# Patient Record
Sex: FEMALE | Race: BLACK OR AFRICAN AMERICAN | Employment: FULL TIME | URBAN - METROPOLITAN AREA
[De-identification: names, ages, dates, MRNs, and addresses within clinical notes are randomized per-mention and may not be internally consistent; named-entity substitution may affect disease eponyms.]

---

## 2017-03-04 ENCOUNTER — HOSPITAL ENCOUNTER (EMERGENCY)
Age: 27
Discharge: HOME | End: 2017-03-04
Attending: EMERGENCY MEDICINE
Payer: COMMERCIAL

## 2017-03-04 VITALS
RESPIRATION RATE: 16 BRPM | OXYGEN SATURATION: 97 % | TEMPERATURE: 98.3 F | SYSTOLIC BLOOD PRESSURE: 126 MMHG | HEART RATE: 90 BPM | DIASTOLIC BLOOD PRESSURE: 71 MMHG | BODY MASS INDEX: 46.65 KG/M2 | HEIGHT: 65 IN | WEIGHT: 280 LBS

## 2017-03-04 DIAGNOSIS — J06.9 URI WITH COUGH AND CONGESTION: Primary | ICD-10-CM

## 2017-03-04 PROCEDURE — 99282 EMERGENCY DEPT VISIT SF MDM: CPT

## 2017-03-04 RX ORDER — CODEINE PHOSPHATE AND GUAIFENESIN 10; 100 MG/5ML; MG/5ML
5 SOLUTION ORAL
Qty: 120 ML | Refills: 0 | Status: SHIPPED | OUTPATIENT
Start: 2017-03-04 | End: 2017-04-20

## 2017-03-04 RX ORDER — AZITHROMYCIN 250 MG/1
TABLET, FILM COATED ORAL
Qty: 6 TAB | Refills: 0 | Status: SHIPPED | OUTPATIENT
Start: 2017-03-04 | End: 2017-03-09

## 2017-03-04 NOTE — LETTER
[facility] EMERGENCY DEPT 
[address] [facility] [address] [id number] [phone] Work/School Note Date: 3/4/2017 To Whom It May concern: 
 
Tico Davis was seen and treated today in the emergency room by the following provider(s): 
Attending Provider: Figueroa Ramachandran MD 
Physician Assistant: Damon Galicia. Rick Alford. Tico Davis may return to work on 3/7/17. Sincerely, 
 
 
 
 
Damon Galicia.  Rick Alford

## 2017-03-04 NOTE — DISCHARGE INSTRUCTIONS

## 2017-03-04 NOTE — ED PROVIDER NOTES
Patient is a 32 y.o. female presenting with cough. The history is provided by the patient. Cough   This is a new problem. The current episode started more than 1 week ago (Pt has had scantiliy productive cough and URI SXs x 1wwek numerous sick contacts and had flu vac 2 mo ago. Had similar SXs a year ago started to recover then relasped to pneumonia). Associated symptoms include rhinorrhea. Pertinent negatives include no sore throat, no shortness of breath and no wheezing. Past Medical History:   Diagnosis Date    Bronchitis        Past Surgical History:   Procedure Laterality Date    HX GYN      c section x2         No family history on file. Social History     Social History    Marital status: SINGLE     Spouse name: N/A    Number of children: N/A    Years of education: N/A     Occupational History    Not on file. Social History Main Topics    Smoking status: Never Smoker    Smokeless tobacco: Not on file    Alcohol use Yes      Comment: monthly    Drug use: No    Sexual activity: Yes     Partners: Male     Birth control/ protection: None     Other Topics Concern    Not on file     Social History Narrative         ALLERGIES: Review of patient's allergies indicates no known allergies. Review of Systems   Constitutional: Negative. HENT: Positive for congestion, rhinorrhea and sinus pressure. Negative for sore throat. Eyes: Negative. Respiratory: Positive for cough. Negative for apnea, choking, chest tightness, shortness of breath, wheezing and stridor. Cardiovascular: Negative. Gastrointestinal: Negative. Neurological: Negative. Vitals:    03/04/17 1317   BP: 126/71   Pulse: 90   Resp: 16   Temp: 98.3 °F (36.8 °C)   SpO2: 97%   Weight: 127 kg (280 lb)   Height: 5' 5\" (1.651 m)            Physical Exam   Constitutional: She is oriented to person, place, and time. She appears well-developed and well-nourished. HENT:   Head: Normocephalic and atraumatic.    Right Ear: External ear normal.   Left Ear: External ear normal.   Nose: Nose normal.   Mouth/Throat: Oropharynx is clear and moist.   Eyes: Pupils are equal, round, and reactive to light. Neck: Normal range of motion. Cardiovascular: Normal rate, regular rhythm and normal heart sounds. Pulmonary/Chest: Effort normal.   BILATERAL RHONCHI THAT WAS CLEARED COMPLETELY BY COUGHING   Abdominal: Soft. Bowel sounds are normal.   Neurological: She is alert and oriented to person, place, and time. She has normal reflexes. Skin: Skin is warm and dry. Psychiatric: She has a normal mood and affect.         MDM  Number of Diagnoses or Management Options    ED Course       Procedures

## 2017-03-04 NOTE — ED NOTES
Emergency Department Nursing Plan of Care       The Nursing Plan of Care is developed from the Nursing assessment and Emergency Department Attending provider initial evaluation. The plan of care may be reviewed in the ED Provider note.     The Plan of Care was developed with the following considerations:   Patient / Family readiness to learn indicated by:verbalized understanding  Persons(s) to be included in education: patient  Barriers to Learning/Limitations:No    Signed     Dora Benz RN    3/4/2017   2:00 PM

## 2017-03-16 ENCOUNTER — HOSPITAL ENCOUNTER (EMERGENCY)
Age: 27
Discharge: HOME | End: 2017-03-16
Attending: EMERGENCY MEDICINE
Payer: COMMERCIAL

## 2017-03-16 VITALS
DIASTOLIC BLOOD PRESSURE: 81 MMHG | RESPIRATION RATE: 18 BRPM | SYSTOLIC BLOOD PRESSURE: 138 MMHG | HEART RATE: 88 BPM | TEMPERATURE: 98.5 F | WEIGHT: 285 LBS | HEIGHT: 65 IN | BODY MASS INDEX: 47.48 KG/M2 | OXYGEN SATURATION: 100 %

## 2017-03-16 DIAGNOSIS — M77.8 LEFT WRIST TENDONITIS: Primary | ICD-10-CM

## 2017-03-16 DIAGNOSIS — M79.605 LEFT LEG PAIN: ICD-10-CM

## 2017-03-16 PROCEDURE — 99283 EMERGENCY DEPT VISIT LOW MDM: CPT

## 2017-03-16 PROCEDURE — L3908 WHO COCK-UP NONMOLDE PRE OTS: HCPCS

## 2017-03-16 RX ORDER — HYDROCODONE BITARTRATE AND ACETAMINOPHEN 5; 325 MG/1; MG/1
1 TABLET ORAL
Qty: 6 TAB | Refills: 0 | Status: SHIPPED | OUTPATIENT
Start: 2017-03-16 | End: 2017-04-20

## 2017-03-16 RX ORDER — PREDNISONE 10 MG/1
TABLET ORAL
Qty: 48 TAB | Refills: 0 | Status: SHIPPED | OUTPATIENT
Start: 2017-03-16 | End: 2017-06-28

## 2017-03-16 NOTE — ED PROVIDER NOTES
HPI Comments: Pt presents with no sign PMHx complaining of recurrent left wrist/arm pain that extends from her neck at times. Pt also complains of intermittent left upper leg cramping worsening over the past 3 weeks. Pt was previously seen in ED for this complaint on 8/2016. Negative cervical xray. Pt works for [employer] and states she performs repetitive movements daily at work including lifting packages, typing, and standing on a hard floor. Pt reports tingling in her left arm. Denies trauma/injury. Pt also reports cramping in her left upper leg x 3 weeks. Denies trauma/injury, leg swelling/warmth, hx of DVT, estrogen use, recent long trip and surgery. Pt also denies hip and back pain, saddle anesthesia, bowel/bladder incontinence. Patient is a 32 y.o. female presenting with arm pain and leg pain. The history is provided by the patient. Arm Pain    This is a recurrent problem. The pain is present in the left wrist, left arm and left elbow. The pain is at a severity of 5/10. The pain is moderate. Associated symptoms include tingling and neck pain. Pertinent negatives include no numbness, full range of motion, no stiffness and no back pain. There has been no history of extremity trauma. Leg Pain    This is a new problem. Episode onset: x 3 weeks. The pain is present in the left upper leg. The quality of the pain is described as aching. The pain is at a severity of 5/10. The pain is moderate. Associated symptoms include tingling and neck pain. Pertinent negatives include no numbness, full range of motion, no stiffness and no back pain. She has tried nothing for the symptoms. There has been no history of extremity trauma. Past Medical History:   Diagnosis Date    Bronchitis        Past Surgical History:   Procedure Laterality Date    HX GYN      c section x2         History reviewed. No pertinent family history.     Social History     Social History    Marital status: SINGLE     Spouse name: N/A    Number of children: N/A    Years of education: N/A     Occupational History    Not on file. Social History Main Topics    Smoking status: Never Smoker    Smokeless tobacco: Not on file    Alcohol use Yes      Comment: monthly    Drug use: No    Sexual activity: Yes     Partners: Male     Birth control/ protection: None     Other Topics Concern    Not on file     Social History Narrative         ALLERGIES: Review of patient's allergies indicates no known allergies. Review of Systems   Constitutional: Negative for chills and fever. Eyes: Negative for photophobia and visual disturbance. Respiratory: Negative for chest tightness and shortness of breath. Cardiovascular: Negative for chest pain. Gastrointestinal: Negative for abdominal pain, nausea and vomiting. Genitourinary: Negative for enuresis and flank pain. Musculoskeletal: Positive for myalgias and neck pain. Negative for arthralgias, back pain, gait problem, joint swelling, neck stiffness and stiffness. Skin: Negative for color change, pallor, rash and wound. Neurological: Positive for tingling. Negative for dizziness, weakness, light-headedness, numbness and headaches. All other systems reviewed and are negative. Vitals:    03/16/17 1002   BP: 138/81   Pulse: 88   Resp: 18   Temp: 98.5 °F (36.9 °C)   SpO2: 100%   Weight: 129.3 kg (285 lb)   Height: 5' 5\" (1.651 m)            Physical Exam   Constitutional: She is oriented to person, place, and time. She appears well-developed and well-nourished. No distress. HENT:   Head: Normocephalic and atraumatic. Eyes: Conjunctivae are normal.   Cardiovascular: Normal rate, regular rhythm and normal heart sounds. Pulmonary/Chest: Effort normal and breath sounds normal. No respiratory distress. Abdominal: Soft. Bowel sounds are normal. There is no tenderness.    Musculoskeletal:        Left elbow: Normal.        Left wrist: She exhibits normal range of motion ( NVI), no tenderness, no bony tenderness, no swelling, no crepitus and no deformity. Left hip: Normal.        Left knee: Normal.        Lumbar back: Normal.        Left forearm: Normal.        Left hand: Normal.        Left upper leg: Normal.        Left lower leg: Normal.   (+) Phalen Test   Neurological: She is alert and oriented to person, place, and time. Skin: Skin is warm. No rash noted. Psychiatric: She has a normal mood and affect. Her behavior is normal.   Nursing note and vitals reviewed. MDM  Number of Diagnoses or Management Options  Diagnosis management comments: DDx: Carpal Tunnel, Wrist Tendonitis, Leg Cramping, Cellulitis     ED Course       Procedures      LABORATORY TESTS:  No results found for this or any previous visit (from the past 12 hour(s)). IMAGING RESULTS:  No orders to display       MEDICATIONS GIVEN:  Medications - No data to display    IMPRESSION:  1. Left wrist tendonitis    2. Left leg pain        PLAN:  1. Discharge Medication List as of 3/16/2017 11:12 AM      START taking these medications    Details   predniSONE (STERAPRED DS) 10 mg dose pack Take as directed, Print, Disp-48 Tab, R-0      HYDROcodone-acetaminophen (NORCO) 5-325 mg per tablet Take 1 Tab by mouth every six (6) hours as needed for Pain. Max Daily Amount: 4 Tabs., Print, Disp-6 Tab, R-0         CONTINUE these medications which have NOT CHANGED    Details   levonorgestrel (MIRENA) 20 mcg/24 hr (5 years) IUD 1 Each by IntraUTERine route once. Indications: PREGNANCY CONTRACEPTION, Historical Med      guaiFENesin-codeine (ROBITUSSIN AC) 100-10 mg/5 mL solution Take 5 mL by mouth three (3) times daily as needed for Cough. Max Daily Amount: 15 mL. , Print, Disp-120 mL, R-0      albuterol (PROVENTIL HFA, VENTOLIN HFA, PROAIR HFA) 90 mcg/actuation inhaler Take 2 Puffs by inhalation every four (4) hours as needed for Wheezing., Print, Disp-1 Inhaler, R-1           2.    Follow-up Information     Follow up With Details Comments Contact Info    Elizabeth Perez MD Schedule an appointment as soon as possible for a visit in 2 days  [address]  [facility] [address] [address] [address] Schedule an appointment as soon as possible for a visit As needed [address] [phone] [address] [facility] [address] [facility] Schedule an appointment as soon as possible for a visit As needed [address] [place] [address]  [address] [facility] [place]  [phone]        Return to ED if worse

## 2017-03-16 NOTE — DISCHARGE INSTRUCTIONS
Wrist Tendinitis: Exercises  Your Care Instructions  Here are some examples of typical rehabilitation exercises for your condition. Start each exercise slowly. Ease off the exercise if you start to have pain. Your doctor or your physical or occupational therapist will tell you when you can start these exercises and which ones will work best for you. How to do the exercises  Wrist flexion and extension    1. Place your forearm on a table, with your hand and affected wrist extended beyond the table, palm down. 2. Bend your wrist to move your hand upward and allow your hand to close into a fist, then lower your hand and allow your fingers to relax. Hold each position for about 6 seconds. 3. Repeat 8 to 12 times. Hand flips    1. While seated, place your forearm and affected wrist on your thigh, palm down. 2. Flip your hand over so the back of your hand rests on your thigh and your palm is up. Alternate between palm up and palm down while keeping your forearm on your thigh. 3. Repeat 8 to 12 times. Wrist radial and ulnar deviation    1. Hold your affected hand out in front of you, palm down. 2. Slowly bend your wrist as far as you can from side to side. Hold each position for about 6 seconds. 3. Repeat 8 to 12 times. Wrist extensor stretch    1. Extend the arm with the affected wrist in front of you and point your fingers toward the floor. 2. With your other hand, gently bend your wrist farther until you feel a mild to moderate stretch in your forearm. 3. Hold the stretch for at least 15 to 30 seconds. 4. Repeat 2 to 4 times. 5. When you can do this stretch with ease and no pain, repeat steps 1 through 4. But this time extend your affected arm in front of you and make a fist with your palm facing down. Then bend your wrist, pointing your fist toward the floor. Wrist flexor stretch    1. Extend the arm with the affected wrist in front of you with your palm facing away from your body.   2. Bend back your wrist, pointing your hand up toward the ceiling. 3. With your other hand, gently bend your wrist farther until you feel a mild to moderate stretch in your forearm. 4. Hold the stretch for at least 15 to 30 seconds. 5. Repeat 2 to 4 times. 6. Repeat steps 1 through 5, but this time extend your affected arm in front of you with your palm facing up. Then bend back your wrist, pointing your hand toward the floor. Follow-up care is a key part of your treatment and safety. Be sure to make and go to all appointments, and call your doctor if you are having problems. It's also a good idea to know your test results and keep a list of the medicines you take. Where can you learn more? Go to http://han-katia.info/. Enter K710 in the search box to learn more about \"Wrist Tendinitis: Exercises. \"  Current as of: May 23, 2016  Content Version: 11.1  © 9378-5388 "[employer]. Care instructions adapted under license by [employer] (which disclaims liability or warranty for this information). If you have questions about a medical condition or this instruction, always ask your healthcare professional. [address] any warranty or liability for your use of this information. Carpal Tunnel Syndrome: Care Instructions  Your Care Instructions    Carpal tunnel syndrome is a nerve problem. It can cause tingling, numbness, weakness, or pain in the fingers, thumb, and hand. The median nerve and several tough tissues called tendons run through a space in the wrist called the carpal tunnel. The repeated hand motions used in work and some hobbies and sports can put pressure on the nerve. Pregnancy and several conditions, including diabetes, arthritis, and an underactive thyroid, also can cause carpal tunnel syndrome. You may be able to limit an activity or do it differently to reduce your symptoms. You also can take other steps to feel better.  If your symptoms are mild, 1 to 2 weeks of home treatment are likely to ease your pain. Surgery is needed only if other treatments do not work. Follow-up care is a key part of your treatment and safety. Be sure to make and go to all appointments, and call your doctor if you are having problems. It's also a good idea to know your test results and keep a list of the medicines you take. How can you care for yourself at home? · If possible, stop or reduce the activity that causes your symptoms. If you cannot stop the activity, take frequent breaks to rest and stretch or change hand positions to do a task. Try switching hands, such as when using a computer mouse. · Try to avoid bending or twisting your wrists. · Ask your doctor if you can take an over-the-counter pain medicine, such as acetaminophen (Tylenol), ibuprofen (Advil, Motrin), or naproxen (Aleve). Be safe with medicines. Read and follow all instructions on the label. · If your doctor prescribes corticosteroid medicine to help reduce pain and swelling, take it exactly as prescribed. Call your doctor if you think you are having a problem with your medicine. · Put ice or a cold pack on your wrist for 10 to 20 minutes at a time to ease pain. Put a thin cloth between the ice and your skin. · If your doctor or your physical or occupational therapist tells you to wear a wrist splint, wear it as directed to keep your wrist in a neutral position. This also eases pressure on your median nerve. · Ask your doctor whether you should have physical or occupational therapy to learn how to do tasks differently. · Try a yoga class to stretch your muscles and build strength in your hands and wrists. Yoga has been shown to ease carpal tunnel symptoms. To prevent carpal tunnel  · When working at a computer, keep your hands and wrists in line with your forearms. Hold your elbows close to your sides. Take a break every 10 to 15 minutes.   · Try these exercises:  ¨ Warm up: Rotate your wrist up, down, and from side to side. Repeat this 4 times. Stretch your fingers far apart, relax them, then stretch them again. Repeat 4 times. Stretch your thumb by pulling it back gently, holding it, and then releasing it. Repeat 4 times. ¨ Prayer stretch: Start with your palms together in front of your chest just below your chin. Slowly lower your hands toward your waistline while keeping your hands close to your stomach and your palms together until you feel a mild to moderate stretch under your forearms. Hold for 10 to 20 seconds. Repeat 4 times. ¨ Wrist flexor stretch: Hold your arm in front of you with your palm up. Bend your wrist, pointing your hand toward the floor. With your other hand, gently bend your wrist further until you feel a mild to moderate stretch in your forearm. Hold for 10 to 20 seconds. Repeat 4 times. ¨ Wrist extensor stretch: Repeat the steps for the wrist flexor stretch, but begin with your extended hand palm down. · Squeeze a rubber exercise ball several times a day to keep your hands and fingers strong. · Avoid holding objects (such as a book) in one position for a long time. When possible, use your whole hand to grasp an object. Using just the thumb and index finger can put stress on the wrist.  · Do not smoke. It can make this condition worse by reducing blood flow to the median nerve. If you need help quitting, talk to your doctor about stop-smoking programs and medicines. These can increase your chances of quitting for good. When should you call for help? Watch closely for changes in your health, and be sure to contact your doctor if:  · Your pain or other problems do not get better with home care. · You want more information about physical or occupational therapy. · You have side effects of your corticosteroid medicine, such as:  ¨ Weight gain. ¨ Mood changes. ¨ Trouble sleeping. ¨ Bruising easily. · You have any other problems with your medicine.   Where can you learn more?  Go to http://han-katia.info/. Enter R432 in the search box to learn more about \"Carpal Tunnel Syndrome: Care Instructions. \"  Current as of: May 23, 2016  Content Version: 11.1  © 4355-7753 [employer]. Care instructions adapted under license by [employer] (which disclaims liability or warranty for this information). If you have questions about a medical condition or this instruction, always ask your healthcare professional. [place] [address] any warranty or liability for your use of this information.

## 2017-03-16 NOTE — ED NOTES
Patient removed left wrist and lower arm splint. Discharge instructions reviewed with patient. Patient given two prescriptions. Patient able to verbalize events which would require immediate follow up. Patient discharged ambulatory.

## 2017-03-16 NOTE — LETTER
[facility] EMERGENCY DEPT 
[address] [facility] [address] [id number] 
[phone] Work/School Note Date: 3/16/2017 To Whom It May concern: 
 
Fatemeh Sanchez was seen and treated today in the emergency room by the following provider(s): 
Attending Provider: Jeremiah Hernandez MD 
Physician Assistant: Rick Adler. Fatemeh Sanchez may return to work on 3/17/2017. Sincerely, CONTRERAS Adler

## 2017-04-20 ENCOUNTER — HOSPITAL ENCOUNTER (EMERGENCY)
Age: 27
Discharge: HOME | End: 2017-04-20
Attending: EMERGENCY MEDICINE
Payer: COMMERCIAL

## 2017-04-20 VITALS
RESPIRATION RATE: 18 BRPM | DIASTOLIC BLOOD PRESSURE: 67 MMHG | HEIGHT: 65 IN | WEIGHT: 285 LBS | BODY MASS INDEX: 47.48 KG/M2 | HEART RATE: 92 BPM | OXYGEN SATURATION: 100 % | TEMPERATURE: 97.4 F | SYSTOLIC BLOOD PRESSURE: 126 MMHG

## 2017-04-20 DIAGNOSIS — R10.32 ABDOMINAL PAIN, LLQ (LEFT LOWER QUADRANT): Primary | ICD-10-CM

## 2017-04-20 LAB
ALBUMIN SERPL BCP-MCNC: 3.1 G/DL (ref 3.5–5)
ALBUMIN/GLOB SERPL: 0.7 {RATIO} (ref 1.1–2.2)
ALP SERPL-CCNC: 80 U/L (ref 45–117)
ALT SERPL-CCNC: 17 U/L (ref 12–78)
ANION GAP BLD CALC-SCNC: 9 MMOL/L (ref 5–15)
APPEARANCE UR: ABNORMAL
AST SERPL W P-5'-P-CCNC: 11 U/L (ref 15–37)
BACTERIA URNS QL MICRO: NEGATIVE /HPF
BASOPHILS # BLD AUTO: 0 K/UL (ref 0–0.1)
BASOPHILS # BLD: 0 % (ref 0–1)
BILIRUB SERPL-MCNC: 0.5 MG/DL (ref 0.2–1)
BILIRUB UR QL: NEGATIVE
BUN SERPL-MCNC: 11 MG/DL (ref 6–20)
BUN/CREAT SERPL: 15 (ref 12–20)
CALCIUM SERPL-MCNC: 8.6 MG/DL (ref 8.5–10.1)
CHLORIDE SERPL-SCNC: 108 MMOL/L (ref 97–108)
CO2 SERPL-SCNC: 27 MMOL/L (ref 21–32)
COLOR UR: ABNORMAL
CREAT SERPL-MCNC: 0.71 MG/DL (ref 0.55–1.02)
DIFFERENTIAL METHOD BLD: ABNORMAL
EOSINOPHIL # BLD: 0.1 K/UL (ref 0–0.4)
EOSINOPHIL NFR BLD: 1 % (ref 0–7)
EPITH CASTS URNS QL MICRO: ABNORMAL /LPF
ERYTHROCYTE [DISTWIDTH] IN BLOOD BY AUTOMATED COUNT: 21.2 % (ref 11.5–14.5)
GLOBULIN SER CALC-MCNC: 4.2 G/DL (ref 2–4)
GLUCOSE SERPL-MCNC: 107 MG/DL (ref 65–100)
GLUCOSE UR STRIP.AUTO-MCNC: NEGATIVE MG/DL
HCG UR QL: NEGATIVE
HCT VFR BLD AUTO: 34.7 % (ref 35–47)
HGB BLD-MCNC: 10.5 G/DL (ref 11.5–16)
HGB UR QL STRIP: ABNORMAL
KETONES UR QL STRIP.AUTO: NEGATIVE MG/DL
LEUKOCYTE ESTERASE UR QL STRIP.AUTO: ABNORMAL
LYMPHOCYTES # BLD AUTO: 30 % (ref 12–49)
LYMPHOCYTES # BLD: 2.8 K/UL (ref 0.8–3.5)
MCH RBC QN AUTO: 19.3 PG (ref 26–34)
MCHC RBC AUTO-ENTMCNC: 30.3 G/DL (ref 30–36.5)
MCV RBC AUTO: 63.9 FL (ref 80–99)
MONOCYTES # BLD: 0.6 K/UL (ref 0–1)
MONOCYTES NFR BLD AUTO: 7 % (ref 5–13)
NEUTS SEG # BLD: 5.7 K/UL (ref 1.8–8)
NEUTS SEG NFR BLD AUTO: 62 % (ref 32–75)
NITRITE UR QL STRIP.AUTO: NEGATIVE
PH UR STRIP: 5.5 [PH] (ref 5–8)
PLATELET # BLD AUTO: 359 K/UL (ref 150–400)
POTASSIUM SERPL-SCNC: 3.9 MMOL/L (ref 3.5–5.1)
PROT SERPL-MCNC: 7.3 G/DL (ref 6.4–8.2)
PROT UR STRIP-MCNC: NEGATIVE MG/DL
RBC # BLD AUTO: 5.43 M/UL (ref 3.8–5.2)
RBC #/AREA URNS HPF: ABNORMAL /HPF (ref 0–5)
RBC MORPH BLD: ABNORMAL
SODIUM SERPL-SCNC: 144 MMOL/L (ref 136–145)
SP GR UR REFRACTOMETRY: 1.01 (ref 1–1.03)
UA: UC IF INDICATED,UAUC: ABNORMAL
UROBILINOGEN UR QL STRIP.AUTO: 0.2 EU/DL (ref 0.2–1)
WBC # BLD AUTO: 9.2 K/UL (ref 3.6–11)
WBC URNS QL MICRO: ABNORMAL /HPF (ref 0–4)

## 2017-04-20 PROCEDURE — 80053 COMPREHEN METABOLIC PANEL: CPT | Performed by: PHYSICIAN ASSISTANT

## 2017-04-20 PROCEDURE — 99283 EMERGENCY DEPT VISIT LOW MDM: CPT

## 2017-04-20 PROCEDURE — 81001 URINALYSIS AUTO W/SCOPE: CPT | Performed by: EMERGENCY MEDICINE

## 2017-04-20 PROCEDURE — 85025 COMPLETE CBC W/AUTO DIFF WBC: CPT | Performed by: PHYSICIAN ASSISTANT

## 2017-04-20 PROCEDURE — 36415 COLL VENOUS BLD VENIPUNCTURE: CPT | Performed by: PHYSICIAN ASSISTANT

## 2017-04-20 PROCEDURE — 81025 URINE PREGNANCY TEST: CPT

## 2017-04-20 RX ORDER — NAPROXEN 500 MG/1
500 TABLET ORAL 2 TIMES DAILY WITH MEALS
Qty: 20 TAB | Refills: 0 | Status: SHIPPED | OUTPATIENT
Start: 2017-04-20 | End: 2017-04-30

## 2017-04-20 RX ORDER — DICYCLOMINE HYDROCHLORIDE 10 MG/1
10 CAPSULE ORAL 4 TIMES DAILY
Qty: 20 CAP | Refills: 0 | Status: SHIPPED | OUTPATIENT
Start: 2017-04-20 | End: 2017-04-25

## 2017-04-20 NOTE — ED NOTES
..  Emergency Department Nursing Plan of Care       The Nursing Plan of Care is developed from the Nursing assessment and Emergency Department Attending provider initial evaluation. The plan of care may be reviewed in the ED Provider note. The Plan of Care was developed with the following considerations:   Patient / Family readiness to learn indicated by:verbalized understanding and appropriate questions asked  Persons(s) to be included in education: patient  Barriers to Learning/Limitations:No    Signed     Devang Zamora RN    4/20/2017   12:36 PM    .. Patient verbalized name and date of birth. Name and date of birth compared to chart to validate information. Patient verbalized that his/her armband contains the correct spelling of name and date of birth.

## 2017-04-20 NOTE — ED PROVIDER NOTES
Patient is a 32 y.o. female presenting with abdominal pain. Abdominal Pain    Associated symptoms include diarrhea and constipation. Pertinent negatives include no fever, no nausea, no vomiting, no dysuria, no frequency, no hematuria, no headaches, no chest pain and no back pain. To ED with complaints of one month h/o left sided \"discomfort\". Pain can be in lower back or in mid left abdomen to very low left abdomen at groin. Pain usually lasts a \"few minutes\" and abates on its own. Does not seem to be movement related. She has had some vaginal bleeding/ spotting and was concerned about her Mirana, appt with GYN last week with US that showed \"no problems with the Mirana and no other GYN problems\". No vaginal discharge. She does note that she would like GI follow up because she has had \"many years\" of occasional sharp abdominal pains across lower abdomen, often followed by and relieved by bouts of non bloody/ non mucus diarrhea. Also has periods of constipation. No fevers. No chills. No urinary sx. Concerned as family member has Crohn's  Disease. To ED     Past Medical History:   Diagnosis Date    Bronchitis        Past Surgical History:   Procedure Laterality Date    HX GYN      c section x2         History reviewed. No pertinent family history. Social History     Social History    Marital status: SINGLE     Spouse name: N/A    Number of children: N/A    Years of education: N/A     Occupational History    Not on file. Social History Main Topics    Smoking status: Never Smoker    Smokeless tobacco: Not on file    Alcohol use Yes      Comment: on occ    Drug use: No    Sexual activity: Yes     Partners: Male     Birth control/ protection: Implant     Other Topics Concern    Not on file     Social History Narrative         ALLERGIES: Review of patient's allergies indicates no known allergies. Review of Systems   Constitutional: Negative for chills and fever.    HENT: Negative for congestion, ear pain, mouth sores, nosebleeds, postnasal drip, rhinorrhea, sneezing and sore throat. Eyes: Negative for pain and discharge. Respiratory: Negative for cough and shortness of breath. Cardiovascular: Negative for chest pain. Gastrointestinal: Positive for abdominal pain, constipation and diarrhea. Negative for blood in stool, nausea, rectal pain and vomiting. Genitourinary: Negative for difficulty urinating, dysuria, frequency, hematuria, pelvic pain and urgency. Musculoskeletal: Negative for back pain and neck pain. Skin: Negative for rash and wound. Neurological: Negative for seizures, syncope and headaches. Psychiatric/Behavioral: Negative for confusion. The patient is not nervous/anxious. All other systems reviewed and are negative. Vitals:    04/20/17 1202   BP: 126/67   Pulse: 92   Resp: 18   Temp: 97.4 °F (36.3 °C)   SpO2: 100%   Weight: 129.3 kg (285 lb)   Height: 5' 5\" (1.651 m)            Physical Exam     GENERAL ASSESSMENT: active, alert, no acute distress, well hydrated, obese  SKIN: no lesions, jaundice, petechiae, pallor, cyanosis, ecchymosis  HEAD: Atraumatic, normocephalic. EARS: bilateral TM's and external ear canals normal.  NOSE: nasal mucosa, septum, turbinates normal bilaterally  MOUTH: mucous membranes moist.  pharynx without erythema or exudate  NECK: supple, full range of motion, no mass  RESP: respiratory effort normal. clear to auscultation with normal breath sounds bilaterally. CARD: Regular rate and rhythm, normal S1/S2, no murmurs, normal pulses and capillary fill  ABDOMEN: normal bowel sounds, soft and non-tender throughout, nondistended, no mass, no organomegaly. No CVA T B. No guarding. No rebound. Left hip FROM. NEURO: AAOX3. Non-focal.   EXTREMITY: Normal muscle tone. No deformity or tenderness.       MDM  Number of Diagnoses or Management Options  Abdominal pain, LLQ (left lower quadrant):   Diagnosis management comments: DDX: Irritable bowel syndrome, musculoskeletal pain. UTI,   Doubt renal stone due to very mild nature of pain. Amount and/or Complexity of Data Reviewed  Clinical lab tests: ordered and reviewed      ED Course       Procedures      LABORATORY TESTS:  Recent Results (from the past 12 hour(s))   URINALYSIS W/ REFLEX CULTURE    Collection Time: 04/20/17 12:32 PM   Result Value Ref Range    Color YELLOW/STRAW      Appearance CLOUDY (A) CLEAR      Specific gravity 1.015 1.003 - 1.030      pH (UA) 5.5 5.0 - 8.0      Protein NEGATIVE  NEG mg/dL    Glucose NEGATIVE  NEG mg/dL    Ketone NEGATIVE  NEG mg/dL    Bilirubin NEGATIVE  NEG      Blood MODERATE (A) NEG      Urobilinogen 0.2 0.2 - 1.0 EU/dL    Nitrites NEGATIVE  NEG      Leukocyte Esterase SMALL (A) NEG      WBC 0-4 0 - 4 /hpf    RBC 0-5 0 - 5 /hpf    Epithelial cells FEW FEW /lpf    Bacteria NEGATIVE  NEG /hpf    UA:UC IF INDICATED CULTURE NOT INDICATED BY UA RESULT CNI     HCG URINE, QL. - POC    Collection Time: 04/20/17 12:33 PM   Result Value Ref Range    Pregnancy test,urine (POC) NEGATIVE  NEG     CBC WITH AUTOMATED DIFF    Collection Time: 04/20/17  1:15 PM   Result Value Ref Range    WBC 9.2 3.6 - 11.0 K/uL    RBC 5.43 (H) 3.80 - 5.20 M/uL    HGB 10.5 (L) 11.5 - 16.0 g/dL    HCT 34.7 (L) 35.0 - 47.0 %    MCV 63.9 (L) 80.0 - 99.0 FL    MCH 19.3 (L) 26.0 - 34.0 PG    MCHC 30.3 30.0 - 36.5 g/dL    RDW 21.2 (H) 11.5 - 14.5 %    PLATELET 337 463 - 463 K/uL    NEUTROPHILS 62 32 - 75 %    LYMPHOCYTES 30 12 - 49 %    MONOCYTES 7 5 - 13 %    EOSINOPHILS 1 0 - 7 %    BASOPHILS 0 0 - 1 %    ABS. NEUTROPHILS 5.7 1.8 - 8.0 K/UL    ABS. LYMPHOCYTES 2.8 0.8 - 3.5 K/UL    ABS. MONOCYTES 0.6 0.0 - 1.0 K/UL    ABS. EOSINOPHILS 0.1 0.0 - 0.4 K/UL    ABS.  BASOPHILS 0.0 0.0 - 0.1 K/UL    DF SMEAR SCANNED      RBC COMMENTS ANISOCYTOSIS  1+       METABOLIC PANEL, COMPREHENSIVE    Collection Time: 04/20/17  1:15 PM   Result Value Ref Range    Sodium 144 136 - 145 mmol/L Potassium 3.9 3.5 - 5.1 mmol/L    Chloride 108 97 - 108 mmol/L    CO2 27 21 - 32 mmol/L    Anion gap 9 5 - 15 mmol/L    Glucose 107 (H) 65 - 100 mg/dL    BUN 11 6 - 20 MG/DL    Creatinine 0.71 0.55 - 1.02 MG/DL    BUN/Creatinine ratio 15 12 - 20      GFR est AA >60 >60 ml/min/1.73m2    GFR est non-AA >60 >60 ml/min/1.73m2    Calcium 8.6 8.5 - 10.1 MG/DL    Bilirubin, total 0.5 0.2 - 1.0 MG/DL    ALT (SGPT) 17 12 - 78 U/L    AST (SGOT) 11 (L) 15 - 37 U/L    Alk. phosphatase 80 45 - 117 U/L    Protein, total 7.3 6.4 - 8.2 g/dL    Albumin 3.1 (L) 3.5 - 5.0 g/dL    Globulin 4.2 (H) 2.0 - 4.0 g/dL    A-G Ratio 0.7 (L) 1.1 - 2.2           IMPRESSION:  1. Abdominal pain, LLQ (left lower quadrant)        PLAN:  1. Current Discharge Medication List      START taking these medications    Details   dicyclomine (BENTYL) 10 mg capsule Take 1 Cap by mouth four (4) times daily for 5 days. Qty: 20 Cap, Refills: 0      naproxen (NAPROSYN) 500 mg tablet Take 1 Tab by mouth two (2) times daily (with meals) for 10 days.   Qty: 20 Tab, Refills: 0         STOP taking these medications       HYDROcodone-acetaminophen (NORCO) 5-325 mg per tablet Comments:   Reason for Stopping:         guaiFENesin-codeine (ROBITUSSIN AC) 100-10 mg/5 mL solution Comments:   Reason for Stopping:         albuterol (PROVENTIL HFA, VENTOLIN HFA, PROAIR HFA) 90 mcg/actuation inhaler Comments:   Reason for Stoppin.   Follow-up Information     None        Return to ED if worse

## 2017-04-20 NOTE — DISCHARGE INSTRUCTIONS
Abdominal Pain: Care Instructions  Your Care Instructions    Abdominal pain has many possible causes. Some aren't serious and get better on their own in a few days. Others need more testing and treatment. If your pain continues or gets worse, you need to be rechecked and may need more tests to find out what is wrong. You may need surgery to correct the problem. Don't ignore new symptoms, such as fever, nausea and vomiting, urination problems, pain that gets worse, and dizziness. These may be signs of a more serious problem. Your doctor may have recommended a follow-up visit in the next 8 to 12 hours. If you are not getting better, you may need more tests or treatment. The doctor has checked you carefully, but problems can develop later. If you notice any problems or new symptoms, get medical treatment right away. Follow-up care is a key part of your treatment and safety. Be sure to make and go to all appointments, and call your doctor if you are having problems. It's also a good idea to know your test results and keep a list of the medicines you take. How can you care for yourself at home? · Rest until you feel better. · To prevent dehydration, drink plenty of fluids, enough so that your urine is light yellow or clear like water. Choose water and other caffeine-free clear liquids until you feel better. If you have kidney, heart, or liver disease and have to limit fluids, talk with your doctor before you increase the amount of fluids you drink. · If your stomach is upset, eat mild foods, such as rice, dry toast or crackers, bananas, and applesauce. Try eating several small meals instead of two or three large ones. · Wait until 48 hours after all symptoms have gone away before you have spicy foods, alcohol, and drinks that contain caffeine. · Do not eat foods that are high in fat. · Avoid anti-inflammatory medicines such as aspirin, ibuprofen (Advil, Motrin), and naproxen (Aleve).  These can cause stomach upset. Talk to your doctor if you take daily aspirin for another health problem. When should you call for help? Call 911 anytime you think you may need emergency care. For example, call if:  · You passed out (lost consciousness). · You pass maroon or very bloody stools. · You vomit blood or what looks like coffee grounds. · You have new, severe belly pain. Call your doctor now or seek immediate medical care if:  · Your pain gets worse, especially if it becomes focused in one area of your belly. · You have a new or higher fever. · Your stools are black and look like tar, or they have streaks of blood. · You have unexpected vaginal bleeding. · You have symptoms of a urinary tract infection. These may include:  ¨ Pain when you urinate. ¨ Urinating more often than usual.  ¨ Blood in your urine. · You are dizzy or lightheaded, or you feel like you may faint. Watch closely for changes in your health, and be sure to contact your doctor if:  · You are not getting better after 1 day (24 hours). Where can you learn more? Go to http://hanDr. Zkatia.info/. Enter D295 in the search box to learn more about \"Abdominal Pain: Care Instructions. \"  Current as of: May 27, 2016  Content Version: 11.2  © 6790-4368 [employer]. Care instructions adapted under license by [employer] (which disclaims liability or warranty for this information). If you have questions about a medical condition or this instruction, always ask your healthcare professional. [place] [address] any warranty or liability for your use of this information. Irritable Bowel Syndrome: Care Instructions  Your Care Instructions  Irritable bowel syndrome, or IBS, is a problem with the intestines that causes belly pain, bloating, gas, constipation, and diarrhea. The cause of IBS is not well known.  IBS can last for many years, but it does not get worse over time or lead to serious disease. Most people can control their symptoms by changing their diet and reducing stress. Follow-up care is a key part of your treatment and safety. Be sure to make and go to all appointments, and call your doctor if you are having problems. It's also a good idea to know your test results and keep a list of the medicines you take. How can you care for yourself at home? · For constipation:  ¨ Include fruits, vegetables, beans, and whole grains in your diet each day. These foods are high in fiber. ¨ Drink plenty of fluids, enough so that your urine is light yellow or clear like water. If you have kidney, heart, or liver disease and have to limit fluids, talk with your doctor before you increase the amount of fluids you drink. ¨ Get some exercise every day. Build up slowly to 30 to 60 minutes a day on 5 or more days of the week. ¨ Take a fiber supplement, such as Citrucel or Metamucil, every day if needed. Read and follow all instructions on the label. ¨ Schedule time each day for a bowel movement. Having a daily routine may help. Take your time and do not strain when having a bowel movement. · If you often have diarrhea, limit foods and drinks that make it worse. These are different for each person but may include caffeine (found in coffee, tea, chocolate, and cola drinks), alcohol, fatty foods, gas-producing foods (such as beans, cabbage, and broccoli), some dairy products, and spicy foods. Do not eat candy or gum that contains sorbitol. · Keep a daily diary of what you eat and what symptoms you have. This may help find foods that cause you problems. · Eat slowly. Try to make mealtime relaxing. · Find ways to reduce stress. · Get at least 30 minutes of exercise on most days of the week. Exercise can help reduce tension and prevent constipation. Walking is a good choice. You also may want to do other activities, such as running, swimming, cycling, or playing tennis or team sports.   When should you call for help? Call your doctor now or seek immediate medical care if:  · Your pain is different than usual or occurs with fever. · You lose weight without trying, or you lose your appetite and you do not know why. · Your symptoms often wake you from sleep. · Your stools are black and tarlike or have streaks of blood. Watch closely for changes in your health, and be sure to contact your doctor if:  · Your IBS symptoms get worse or begin to disrupt your day-to-day life. · You become more tired than usual.  · Your home treatment stops working. Where can you learn more? Go to http://han-katia.info/. Enter N220 in the search box to learn more about \"Irritable Bowel Syndrome: Care Instructions. \"  Current as of: August 9, 2016  Content Version: 11.2  © 3367-2377 [employer]. Care instructions adapted under license by [employer] (which disclaims liability or warranty for this information). If you have questions about a medical condition or this instruction, always ask your healthcare professional. [place] [address] any warranty or liability for your use of this information.

## 2017-04-20 NOTE — ED NOTES
Pt D/C by provider. Pt left unit steady gait. Pt refuse W/C for D/C       Patient (s)  given copy of dc instructions and 2 script(s). Patient(s)  verbalized understanding of instructions and script (s). Patient given a current medication reconciliation form and verbalized understanding of their medications. Patient (s) verbalized understanding of the importance of discussing medications with  his or her physician or clinic when they follow up. Patient alert and oriented and in no acute distress. Pt verbalizes pain scale of 0 out of 10. Patient discharged home ambulatory with self.

## 2017-04-20 NOTE — LETTER
[facility] EMERGENCY DEPT 
[address] [id number] [phone] Work/School Note Date: 4/20/2017 To Whom It May concern: 
 
Norma Verduzco was seen and treated today in the emergency room by the following provider(s): 
Attending Provider: Nagi Montana MD 
Physician Assistant: CONTRERAS Wright Mc. Please excuse from work 4/20 and 4/21/2017. May return 4/22/2017. Sincerely, CONTRERAS Wright Mc

## 2017-06-28 ENCOUNTER — APPOINTMENT (OUTPATIENT)
Dept: GENERAL RADIOLOGY | Age: 27
End: 2017-06-28
Attending: EMERGENCY MEDICINE
Payer: COMMERCIAL

## 2017-06-28 ENCOUNTER — HOSPITAL ENCOUNTER (EMERGENCY)
Age: 27
Discharge: HOME | End: 2017-06-28
Attending: EMERGENCY MEDICINE
Payer: COMMERCIAL

## 2017-06-28 VITALS
OXYGEN SATURATION: 98 % | HEIGHT: 65 IN | RESPIRATION RATE: 16 BRPM | BODY MASS INDEX: 47.48 KG/M2 | WEIGHT: 285 LBS | TEMPERATURE: 98.3 F | SYSTOLIC BLOOD PRESSURE: 138 MMHG | HEART RATE: 82 BPM | DIASTOLIC BLOOD PRESSURE: 83 MMHG

## 2017-06-28 DIAGNOSIS — M54.2 TENDERNESS OF NECK: Primary | ICD-10-CM

## 2017-06-28 PROCEDURE — 99283 EMERGENCY DEPT VISIT LOW MDM: CPT

## 2017-06-28 PROCEDURE — 70360 X-RAY EXAM OF NECK: CPT

## 2017-06-28 NOTE — LETTER
[facility] EMERGENCY DEPT 
[address] [place] [address] [address] 
[phone] Work/School Note Date: 6/28/2017 To Whom It May concern: 
 
Johnny Holland was seen and treated today in the emergency room by the following provider(s): 
Attending Provider: Owen Sánchez MD.   
 
Johnny Holland may return to work on 6/30/17. Sincerely, Medina Pringle

## 2017-06-28 NOTE — ED PROVIDER NOTES
HPI Comments: Ritesh Silvestre is a 32 y.o. female with PMHx significant for bronchitis, who presents ambulatory to [address] [place] [address] ED with cc of intermittent throat discomfort for several months. Pt also complains of a slight cough. She states that it feels like \"something is stuck there. ..only when I'm messing with it\". She notes that it feels more like an internal discomfort and that she hasn't worn any type of new jewelry or clothing. Pt denies any throat or neck pain, fever, vomiting, trouble swallowing, dizziness, lightheadedness, sneezing, choking or rhinorrhea. PCP:Glenn Hoffman MD    Social Hx: - smoking; + EtOH; - illicit drug use    There are no other complains, changes, or physical findings at this time. The history is provided by the patient. Past Medical History:   Diagnosis Date    Bronchitis        Past Surgical History:   Procedure Laterality Date    HX GYN      c section x2         History reviewed. No pertinent family history. Social History     Social History    Marital status: SINGLE     Spouse name: N/A    Number of children: N/A    Years of education: N/A     Occupational History    Not on file. Social History Main Topics    Smoking status: Never Smoker    Smokeless tobacco: Not on file    Alcohol use Yes      Comment: on occ    Drug use: No    Sexual activity: Yes     Partners: Male     Birth control/ protection: Implant     Other Topics Concern    Not on file     Social History Narrative         ALLERGIES: Review of patient's allergies indicates no known allergies. Review of Systems   Constitutional: Negative for chills and fever. HENT: Positive for sore throat. Negative for congestion, rhinorrhea, sneezing and trouble swallowing. Eyes: Negative for visual disturbance. Respiratory: Negative for cough, choking and shortness of breath. Cardiovascular: Negative for chest pain. Gastrointestinal: Negative for abdominal pain, nausea and vomiting. Genitourinary: Negative for difficulty urinating and dysuria. Musculoskeletal: Negative for arthralgias, back pain and neck pain. Skin: Negative for color change and rash. Neurological: Negative for dizziness, weakness, light-headedness and headaches. Patient Vitals for the past 12 hrs:   Temp Pulse Resp BP SpO2   06/28/17 1118 - 82 16 138/83 98 %   06/28/17 1041 98.3 °F (36.8 °C) 85 18 (!) 121/93 100 %       Physical Exam   Constitutional: She is oriented to person, place, and time. She appears well-developed and well-nourished. HENT:   Mouth/Throat: Uvula is midline and oropharynx is clear and moist. No oropharyngeal exudate. Tongue not swollen. Neck: Normal range of motion. Trachea is midline. Pt with larger size neck, difficult to assess thyroid at this time. Cardiovascular: Normal rate, regular rhythm, normal heart sounds and intact distal pulses. Pulmonary/Chest: Effort normal and breath sounds normal.   Abdominal: Soft. Bowel sounds are normal.   Neurological: She is alert and oriented to person, place, and time. Skin: Skin is warm and dry. No rash noted. Nursing note and vitals reviewed. MDM  Number of Diagnoses or Management Options  Diagnosis management comments: DDx: peritonsil abscess, soft tissue swelling, enlarged thyroid, adenopathy       Amount and/or Complexity of Data Reviewed  Tests in the radiology section of CPT®: reviewed and ordered  Review and summarize past medical records: yes    Patient Progress  Patient progress: stable    ED Course       Procedures    IMAGING RESULTS:  XR NECK SOFT TISSUE   Final Result        EXAM:  XR NECK SOFT TISSUE  INDICATION: Sore throat.     FINDINGS: AP and lateral soft tissue radiographs of the neck demonstrate a  normal epiglottis, retropharyngeal soft tissues and airway. The visualized  cervical spine is normal.     IMPRESSION  IMPRESSION: Normal soft tissues of the neck.              IMPRESSION:  1.  Tenderness of neck PLAN:  1. Follow-up Information     Follow up With Details Comments Contact Info    [facility] EMERGENCY DEPT  If symptoms worsen [address] Nito Banuelos MD Call today for thyroid, neck follow up [address] [place] [address]  [phone]          Return to ED if worse     DISCHARGE NOTE  11:22 AM  The patient has been re-evaluated and is ready for discharge. Reviewed available results with patient. Counseled patient on diagnosis and care plan. Patient has expressed understanding, and all questions have been answered. Patient agrees with plan and agrees to follow up as recommended, or return to the ED if their symptoms worsen. Discharge instructions have been provided and explained to the patient, along with reasons to return to the ED. ATTESTATION:  This note is prepared by Elvis Johnson, acting as Scribe for Sami Hanley MD.    Sami Hanley MD: The scribe's documentation has been prepared under my direction and personally reviewed by me in its entirety. I confirm that the note above accurately reflects all work, treatment, procedures, and medical decision making performed by me.

## 2017-06-28 NOTE — ED NOTES
Pt given a work note. 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

## 2017-06-28 NOTE — ED NOTES
Pt reports feeling discomfort in her throat x more than a month. Pt reports she also has neck discomfort that she relates to sitting at a desk all day at work. Pt has a PCP, reports she saw PCP about the neck pain. Pt is not having any trouble breathing, swallowing or eating.

## 2017-06-28 NOTE — ED NOTES
Pt given a cup of ice on request.      Emergency Department Nursing Plan of Care       The Nursing Plan of Care is developed from the Nursing assessment and Emergency Department Attending provider initial evaluation. The plan of care may be reviewed in the ED Provider note.     The Plan of Care was developed with the following considerations:   Patient / Family readiness to learn indicated by:verbalized understanding  Persons(s) to be included in education: patient  Barriers to Learning/Limitations:No    Signed     Juan Luis Heredia RN    6/28/2017   11:26 AM

## 2017-06-28 NOTE — DISCHARGE INSTRUCTIONS
Sore Throat: Care Instructions  Your Care Instructions    Infection by bacteria or a virus causes most sore throats. Cigarette smoke, dry air, air pollution, allergies, and yelling can also cause a sore throat. Sore throats can be painful and annoying. Fortunately, most sore throats go away on their own. If you have a bacterial infection, your doctor may prescribe antibiotics. Follow-up care is a key part of your treatment and safety. Be sure to make and go to all appointments, and call your doctor if you are having problems. It's also a good idea to know your test results and keep a list of the medicines you take. How can you care for yourself at home? · If your doctor prescribed antibiotics, take them as directed. Do not stop taking them just because you feel better. You need to take the full course of antibiotics. · Gargle with warm salt water once an hour to help reduce swelling and relieve discomfort. Use 1 teaspoon of salt mixed in 1 cup of warm water. · Take an over-the-counter pain medicine, such as acetaminophen (Tylenol), ibuprofen (Advil, Motrin), or naproxen (Aleve). Read and follow all instructions on the label. · Be careful when taking over-the-counter cold or flu medicines and Tylenol at the same time. Many of these medicines have acetaminophen, which is Tylenol. Read the labels to make sure that you are not taking more than the recommended dose. Too much acetaminophen (Tylenol) can be harmful. · Drink plenty of fluids. Fluids may help soothe an irritated throat. Hot fluids, such as tea or soup, may help decrease throat pain. · Use over-the-counter throat lozenges to soothe pain. Regular cough drops or hard candy may also help. These should not be given to young children because of the risk of choking. · Do not smoke or allow others to smoke around you. If you need help quitting, talk to your doctor about stop-smoking programs and medicines.  These can increase your chances of quitting for good. · Use a vaporizer or humidifier to add moisture to your bedroom. Follow the directions for cleaning the machine. When should you call for help? Call your doctor now or seek immediate medical care if:  · You have new or worse trouble swallowing. · Your sore throat gets much worse on one side. Watch closely for changes in your health, and be sure to contact your doctor if you do not get better as expected. Where can you learn more? Go to http://han-katia.info/. Enter [phone] [id number] in the search box to learn more about \"Sore Throat: Care Instructions. \"  Current as of: July 29, 2016  Content Version: 11.3  © 0896-6778 [employer]. Care instructions adapted under license by [employer] (which disclaims liability or warranty for this information). If you have questions about a medical condition or this instruction, always ask your healthcare professional. [place] [address] any warranty or liability for your use of this information. Neck Pain: Care Instructions  Your Care Instructions  You can have neck pain anywhere from the bottom of your head to the top of your shoulders. It can spread to the upper back or arms. Injuries, painting a ceiling, sleeping with your neck twisted, staying in one position for too long, and many other activities can cause neck pain. Most neck pain gets better with home care. Your doctor may recommend medicine to relieve pain or relax your muscles. He or she may suggest exercise and physical therapy to increase flexibility and relieve stress. You may need to wear a special (cervical) collar to support your neck for a day or two. Follow-up care is a key part of your treatment and safety. Be sure to make and go to all appointments, and call your doctor if you are having problems. It's also a good idea to know your test results and keep a list of the medicines you take. How can you care for yourself at home?   · Try using a heating pad on a low or medium setting for 15 to 20 minutes every 2 or 3 hours. Try a warm shower in place of one session with the heating pad. · You can also try an ice pack for 10 to 15 minutes every 2 to 3 hours. Put a thin cloth between the ice and your skin. · Take pain medicines exactly as directed. ¨ If the doctor gave you a prescription medicine for pain, take it as prescribed. ¨ If you are not taking a prescription pain medicine, ask your doctor if you can take an over-the-counter medicine. · If your doctor recommends a cervical collar, wear it exactly as directed. When should you call for help? Call your doctor now or seek immediate medical care if:  · You have new or worsening numbness in your arms, buttocks or legs. · You have new or worsening weakness in your arms or legs. (This could make it hard to stand up.)  · You lose control of your bladder or bowels. Watch closely for changes in your health, and be sure to contact your doctor if:  · Your neck pain is getting worse. · You are not getting better after 1 week. · You do not get better as expected. Where can you learn more? Go to http://han-katia.info/. Enter [id number] in the search box to learn more about \"Neck Pain: Care Instructions. \"  Current as of: March 21, 2017  Content Version: 11.3  © 9441-8628 [employer]. Care instructions adapted under license by [employer] (which disclaims liability or warranty for this information). If you have questions about a medical condition or this instruction, always ask your healthcare professional. [place] [address] any warranty or liability for your use of this information.

## 2017-07-11 ENCOUNTER — OFFICE VISIT (OUTPATIENT)
Dept: SURGERY | Age: 27
End: 2017-07-11

## 2017-07-11 VITALS
DIASTOLIC BLOOD PRESSURE: 60 MMHG | WEIGHT: 293 LBS | TEMPERATURE: 98.6 F | HEART RATE: 70 BPM | RESPIRATION RATE: 18 BRPM | SYSTOLIC BLOOD PRESSURE: 126 MMHG | BODY MASS INDEX: 48.82 KG/M2 | HEIGHT: 65 IN | OXYGEN SATURATION: 9 %

## 2017-07-11 DIAGNOSIS — K21.9 GASTROESOPHAGEAL REFLUX DISEASE, ESOPHAGITIS PRESENCE NOT SPECIFIED: ICD-10-CM

## 2017-07-11 DIAGNOSIS — E66.01 MORBID OBESITY WITH BMI OF 50.0-59.9, ADULT (HCC): Primary | ICD-10-CM

## 2017-07-11 NOTE — PROGRESS NOTES
1. Have you been to the ER, urgent care clinic since your last visit? Hospitalized since your last visit?  no    2. Have you seen or consulted any other health care providers outside of the [address] [place] [address] since your last visit? Include any pap smears or colon screening. ivan Moon  Body composition    female  32 y.o. Vitals:    07/11/17 1405   Weight: 306 lb 8 oz (139 kg)   Height: 5' 5\" (1.651 m)     Body mass index is 51 kg/(m^2). George Jacob Neck- 16.5 inches  Waist- 52.5 inches  Hips- 60 inches  Frame size- medium

## 2017-07-21 PROBLEM — E66.01 MORBID OBESITY WITH BMI OF 50.0-59.9, ADULT (HCC): Status: ACTIVE | Noted: 2017-07-21

## 2017-07-21 PROBLEM — K21.9 GERD (GASTROESOPHAGEAL REFLUX DISEASE): Status: ACTIVE | Noted: 2017-07-21

## 2017-07-21 NOTE — PROGRESS NOTES
Bariatric Surgery Consult    Laisha Rose is a 32 y.o. female with a history of morbid obesity. Her Height: 5' 5\" (165.1 cm), Weight: 306 lb 8 oz (139 kg). Body mass index is 51 kg/(m^2). She reports that she has been trying to lose weight for 10 years. Her maximum weight was 306 pounds. She has attended our bariatric surgery information seminar. [place] wants to consider laparoscopic sleeve gastrectomy. Pt is referred by:  Kendrick Harris MD.    Dietary History:   The patient says that in the past, physician supervised, unsupervised diets, Weight Watchers and Atkins have not resulted in real success. When asked why she was not able to achieve or maintain significant weight loss she replied, \"She has tried to lose weight with many different methods but lost only 20 lbs and did not find long term success\". Number of meals per day: 3  Portion size: medium  Snacks: a few times daily    Comorbidities:     Bariatric comorbidities present: GERD    Ambulatory status: independent    The patient's reported level of exercise: moderately active. Patient Active Problem List    Diagnosis Date Noted    Morbid obesity with BMI of 50.0-59.9, adult ([facility] [address] 07/21/2017    GERD (gastroesophageal reflux disease) 07/21/2017     Past Medical History:   Diagnosis Date    Bronchitis     Dyspepsia and other specified disorders of function of stomach       Past Surgical History:   Procedure Laterality Date    HX GYN      c section x2      Social History   Substance Use Topics    Smoking status: Never Smoker    Smokeless tobacco: Never Used    Alcohol use Yes      Comment: occ      Family History   Problem Relation Age of Onset    Diabetes Mother     Elevated Lipids Maternal Grandmother     Psychiatric Disorder Maternal Grandmother     Heart Disease Maternal Grandfather     Psychiatric Disorder Maternal Grandfather       .   Current Outpatient Prescriptions   Medication Sig    levonorgestrel (MIRENA) 20 mcg/24 hr (5 years) IUD 1 Each by IntraUTERine route once. Indications: PREGNANCY CONTRACEPTION     No current facility-administered medications for this visit. No Known Allergies      Review of Systems:    Constitutional: negative  Ears, Nose, Mouth, Throat, and Face: negative  Respiratory: negative  Cardiovascular: negative  Gastrointestinal: mild GERD  Genitourinary:negative  Integument/Breast: negative  Hematologic/Lymphatic: negative  Musculoskeletal:negative  Neurological: negative  Behavioral/Psychiatric: negative    Objective:     Visit Vitals    /60    Pulse 70    Temp 98.6 °F (37 °C)    Resp 18    Ht 5' 5\" (1.651 m)    Wt 306 lb 8 oz (139 kg)    SpO2 (!) 9%    BMI 51 kg/m2        Physical Exam:    General:  alert, no distress, morbidly obese   Eyes:  conjunctivae and sclerae normal, pupils equal, round, reactive to light, extraocular movements intact without nystagmus   Throat & Neck: no erythema or exudates noted and neck supple and symmetrical; no palpable masses   Lungs:   clear to auscultation bilaterally   Heart:  Regular rate and rhythm   Abdomen:   obese, soft, nontender, nondistended, no masses or organomegaly,    Extremities: no edema,  no gait disturbances   Skin: Normal.       Assessment:     1. Morbid obesity (Body mass index is 51 kg/(m^2). ) with multiple comorbidities. The patient meets criteria established by the NIH for weight loss surgery candidates. Without weight reduction, co-morbidities will escalate as well as increase risk of early mortality. Our recommendation is the patient could be served with laparoscopic sleeve gastrectomy. I explained to the patient differences between laparoscopic gastric bypass, laparoscopic adjustable gastric banding, and laparoscopic vertical sleeve gastrectomy with respect to expected weight loss, resolution of comorbidities and risks. Ms. Zina Aguirre has attended one our informational meetings and has seen our educational materials.  She has requested Dr. Tamika Pozo to perform her procedure. I reviewed the role for this procedure as a tool to help her achieve her weight loss goals. I reminded her that effective weight loss comes from lifelong adherence to changes in dietary choices, eating habits and exercise. Recommendation: We will request approval for laparoscopic sleeve gastrectomy. We recommend that the patient undergo the following evaluations prior to considering surgery:    Cardiology: no  Dietician: yes  Gastroenterology: yes  Psychiatry/Psychology: yes  Pulmonology: no  Sleep Medicine: no    She does have some GERD and will need EGD prior to surgery to see if a hiatal hernia is present, otherwise she appears to be a good candidate for sleeve gastrectomy. Signed By: Marian Barthel, MD     July 21, 2017       Greater than half of the time: 30 minutes was used in counciling the patient about bariatric surgery and the steps she needs to take to move forward with her surgery. Ms. Sloan Moran has a reminder for a \"due or due soon\" health maintenance. I have asked that she contact her primary care provider for follow-up on this health maintenance.

## 2017-07-27 ENCOUNTER — TELEPHONE (OUTPATIENT)
Dept: SURGERY | Age: 27
End: 2017-07-27

## 2017-08-22 ENCOUNTER — CLINICAL SUPPORT (OUTPATIENT)
Dept: SURGERY | Age: 27
End: 2017-08-22

## 2017-08-22 VITALS — BODY MASS INDEX: 51.75 KG/M2 | WEIGHT: 293 LBS

## 2017-08-22 DIAGNOSIS — E66.01 MORBID OBESITY WITH BMI OF 50.0-59.9, ADULT (HCC): Primary | ICD-10-CM

## 2017-08-22 NOTE — PROGRESS NOTES
Pre-operative Bariatric Nutrition Evaluation (1 of 3)     Date: 2017   Teresa Morse M.D. Name: Madeline Alexis  :  1990  Age:  32  Gender: Female   Type of Surgery: []           Gastric Bypass  []           LAGB  [x]           Sleeve Gastrectomy    ASSESSMENT:    Past Medical History:HTN, arthritis, bronchitis, SOB      Medications/Supplements:   Prior to Admission medications    Medication Sig Start Date End Date Taking? Authorizing Provider   levonorgestrel (MIRENA) 20 mcg/24 hr (5 years) IUD 1 Each by IntraUTERine route once. Indications: PREGNANCY CONTRACEPTION    Phys Other, MD       Food Allergies/Intolerances:none     Anthropometrics:    Ht:65\"   Wt: 311#    IBW: 125#    %IBW: 248%     BMI:51    Category: obesity III     Reported wt history:Pt presents today for pre-op nutrition evaluation for wt loss surgery. Reports wt gain began after high school when level of physical activity decreased. Ran track in high school and once she graduated and started working she became more sedentary along with changes in lifestyle after having children. Has attempted wt loss through various methods with most successful wt loss with a physician prescribed program using prepared meals and shakes. Was unable to maintain that program long term and regained the weight. Is now seeking approval for wt loss surgery and will need to complete 3 months supervised wt loss. Exercise/Physical Activity:walking for 30 minutes, 2-3 times per week     Reported Diet History:physician prescribed diets, Weight Watchers, diet pills    24 Hour Diet Recall  Breakfast  Usually skips    Lunch  [place], chips; out to eat 75% of the time for lunch, sometimes goes home for lunch    Dinner  Chicken or fish (fried and baked), veggies, starch;eating out if too tired to cook at home    Snacks  Denies    Beverages  Water, juice, sodas      A pre-op nutrition checklist was reviewed. Patient checked off 5 of 15 items. Environment/Psychosocial/Support:mother and dedicated friends as support system; pt has 2 children - 2 yo and 10 yo    NUTRITION DIAGNOSIS:  1. Excessive energy intake r/t food and beverage preferences evidenced by diet recall that reveals heavy reliance on fast food/restaurant meals and high caloric density beverages. Minimal grocery shopping and food prep at home. High fat method of cooking. 2. Self-monitoring deficit r/t lack of prior value for this change evidenced by pt reports skipping breakfast most days. NUTRITION INTERVENTION:  Pt educated on nutrition recommendations for weight loss surgery, specifically sleeve gastrectomy. Instructed on consuming 3 meals per day starting now. Use the balanced plate method to plan meals, include 3 oz of lean source of protein, 1/2 cup whole grains, unlimited non-starchy vegetables, 1/2 cup fruit and 1 serving of low fat dairy. Utilize handouts listing healthy snack and meal ideas to limit restaurant meals. After surgery measure all meals to 1/2 cup. Each meal will contain a 1/4 cup lean protein and 1/4 cup fruit, non-starchy vegetable or starch (limiting to once per day). Aim for 60 g protein per day. Sip on 48-64 oz of sugar free, calorie free, non-carbonated beverages each day. Do not use a straw. Do not consume beverages 30 minutes before, during or 30 minutes after meals. Read all nutrition labels. Demonstrated and emphasized identifying serving size, total fat, sugar and protein content. Defined low fat as </= 3 g per serving. Discussed lean and extra lean sources of protein. Provided list of low fat cooking methods. Avoid foods with sugar listed in the first 3 ingredients and >/15 g sugar per serving. Excess sugar/fat intake may lead to dumping syndrome. Discussed signs and symptoms of dumping syndrome. Practice mindful eating habits; take small bites, chew thoroughly, avoid distractions, utilize hunger/fullness scale.  Consume meals over 20-30 minutes. Attend Bariatric Support Group and increase physical activity (approved per MD) for long term weight maintenance. NUTRITION MONITORING AND EVALUATION:    The following goals were established with patient;  1. Decrease reliance on fast food/restaurant meals. Grocery shop on Tuesdays (day off from work, no kids as distraction) and plan meals for the week ahead. Tips for grocery shopping on a budget and meal prep provided. Pack more meals to take to work and less reliance on eating out. Use healthier methods of cooking. Examples and tips provided. 2. Decrease soda and juice intake. Drink mostly water, non-carbonated and calorie-free beverages. 3. Eat breakfast daily. Start by using protein shakes PRN and eventually transition to eating solid foods at breakfast. Meal ideas, protein shake coupons provided. 4. Maintain exercise and work to eventually increase to 150 minutes per week. 5. Follow up next month for supervised wt loss. Specific tips and techniques to facilitate compliance with above recommendations were provided and discussed. Pt was strongly encourage to begin making necessary changes now, attend support group, and re-visit the dietitian prn. Nutrition evaluation reveals important lifestyle changes are indicated to better comply with nutrition guidelines in preparation for wt loss surgery. Goals set and recommendations made. Will continue to assess as pt works to complete supervised wt loss requirements. If further details are desired please feel free to contact me at [phone]. This phone number was also provided to the patient for any further questions or concerns.            Theresa Templeton RD

## 2017-09-20 ENCOUNTER — HOSPITAL ENCOUNTER (EMERGENCY)
Age: 27
Discharge: HOME | End: 2017-09-20
Attending: EMERGENCY MEDICINE | Admitting: EMERGENCY MEDICINE
Payer: COMMERCIAL

## 2017-09-20 VITALS
DIASTOLIC BLOOD PRESSURE: 71 MMHG | BODY MASS INDEX: 48.82 KG/M2 | HEIGHT: 65 IN | HEART RATE: 90 BPM | RESPIRATION RATE: 12 BRPM | SYSTOLIC BLOOD PRESSURE: 120 MMHG | TEMPERATURE: 98.5 F | WEIGHT: 293 LBS | OXYGEN SATURATION: 96 %

## 2017-09-20 DIAGNOSIS — J30.9 ALLERGIC RHINITIS, UNSPECIFIED ALLERGIC RHINITIS TRIGGER, UNSPECIFIED RHINITIS SEASONALITY: Primary | ICD-10-CM

## 2017-09-20 PROCEDURE — 99282 EMERGENCY DEPT VISIT SF MDM: CPT

## 2017-09-20 RX ORDER — CETIRIZINE HCL 10 MG
10 TABLET ORAL DAILY
Qty: 30 TAB | Refills: 0 | Status: SHIPPED | OUTPATIENT
Start: 2017-09-20 | End: 2017-10-20

## 2017-09-20 NOTE — LETTER
[address] EMERGENCY DEPT 
[address] [facility] [address] [id number] 
[phone] Work/School Note Date: 9/20/2017 To Whom It May concern: 
 
Nereida Stoll was seen and treated today in the emergency room by the following provider(s): 
Attending Provider: Lorena Moreira MD 
Physician Assistant: Savage Hilton PA-C. Nereida Stoll may return to work on 9/21/2017. Sincerely, Savage Hilton PA-C

## 2017-09-20 NOTE — ED NOTES
Emergency Department Nursing Plan of Care       The Nursing Plan of Care is developed from the Nursing assessment and Emergency Department Attending provider initial evaluation. The plan of care may be reviewed in the ED Provider note.     The Plan of Care was developed with the following considerations:   Patient / Family readiness to learn indicated by:verbalized understanding  Persons(s) to be included in education: patient  Barriers to Learning/Limitations:No    Signed     Miguel Bell RN    9/20/2017   2:37 PM

## 2017-09-20 NOTE — DISCHARGE INSTRUCTIONS

## 2017-09-20 NOTE — ED PROVIDER NOTES
Patient is a 32 y.o. female presenting with ear pain. The history is provided by the patient. Ear Pain    This is a new (Intermittent Lt ear \"irritation\" and pain x \"months\". ) problem. The current episode started more than 1 week ago. The problem occurs every several days. The problem has not changed since onset. Patient complains that the left ear is affected. There has been no fever. The pain is at a severity of 5/10. The pain is mild. Associated symptoms include rhinorrhea and neck pain (\"swollen\"). Pertinent negatives include no ear discharge, no headaches, no hearing loss, no sore throat, no abdominal pain, no diarrhea, no vomiting, no cough and no rash. Her past medical history does not include chronic ear infection, hearing loss or tympanostomy tube. Past Medical History:   Diagnosis Date    Bronchitis     Dyspepsia and other specified disorders of function of stomach        Past Surgical History:   Procedure Laterality Date    HX GYN      c section x2         Family History:   Problem Relation Age of Onset    Diabetes Mother    [employer] Elevated Lipids Maternal Grandmother     Psychiatric Disorder Maternal Grandmother     Heart Disease Maternal Grandfather     Psychiatric Disorder Maternal Grandfather        Social History     Social History    Marital status: SINGLE     Spouse name: N/A    Number of children: N/A    Years of education: N/A     Occupational History    Not on file. Social History Main Topics    Smoking status: Never Smoker    Smokeless tobacco: Never Used    Alcohol use Yes      Comment: occ    Drug use: No    Sexual activity: Yes     Partners: Male     Birth control/ protection: Implant     Other Topics Concern    Not on file     Social History Narrative         ALLERGIES: Review of patient's allergies indicates no known allergies. Review of Systems   Constitutional: Negative for activity change, chills, fatigue and fever.    HENT: Positive for congestion, ear pain, postnasal drip, rhinorrhea and sneezing. Negative for drooling, ear discharge, facial swelling, hearing loss, sinus pressure, sore throat and trouble swallowing. Eyes: Negative for photophobia, pain, discharge, redness, itching and visual disturbance. Respiratory: Negative for cough, chest tightness, shortness of breath, wheezing and stridor. Cardiovascular: Negative for chest pain. Gastrointestinal: Negative for abdominal pain, constipation, diarrhea, nausea and vomiting. Genitourinary: Negative. Musculoskeletal: Positive for neck pain (\"swollen\"). Negative for myalgias. Skin: Negative. Negative for rash. Allergic/Immunologic: Positive for environmental allergies. Neurological: Negative for headaches. Psychiatric/Behavioral: Negative. Negative for confusion. Vitals:    09/20/17 1419   BP: 120/71   Pulse: 90   Resp: 12   Temp: 98.5 °F (36.9 °C)   SpO2: 96%   Weight: 137 kg (302 lb)   Height: 5' 5\" (1.651 m)            Physical Exam   Constitutional: She is oriented to person, place, and time. She appears well-developed and well-nourished. No distress. HENT:   Head: Normocephalic and atraumatic. Right Ear: Hearing, tympanic membrane, external ear and ear canal normal.   Left Ear: Hearing, tympanic membrane, external ear and ear canal normal.   Nose: Mucosal edema and rhinorrhea present. Right sinus exhibits no maxillary sinus tenderness and no frontal sinus tenderness. Left sinus exhibits no maxillary sinus tenderness and no frontal sinus tenderness. Mouth/Throat: Uvula is midline and oropharynx is clear and moist. No trismus in the jaw. No uvula swelling. No oropharyngeal exudate, posterior oropharyngeal edema or tonsillar abscesses. Eyes: Conjunctivae and EOM are normal. Pupils are equal, round, and reactive to light. Neck: Normal range of motion. Cardiovascular: Normal rate, regular rhythm, normal heart sounds and intact distal pulses.   Exam reveals no gallop and no friction rub. No murmur heard. Pulmonary/Chest: Effort normal. No respiratory distress. She has no wheezes. She has no rales. She exhibits no tenderness. Abdominal: Soft. Musculoskeletal: Normal range of motion. Lymphadenopathy:        Head (right side): No submental, no submandibular, no tonsillar, no preauricular, no posterior auricular and no occipital adenopathy present. Head (left side): No submental, no submandibular, no tonsillar, no preauricular, no posterior auricular and no occipital adenopathy present. She has no cervical adenopathy. She has no axillary adenopathy. Neurological: She is alert and oriented to person, place, and time. Skin: Skin is warm and dry. She is not diaphoretic. Psychiatric: She has a normal mood and affect. Her behavior is normal. Judgment and thought content normal.   Nursing note and vitals reviewed. MDM  Number of Diagnoses or Management Options  Allergic rhinitis, unspecified allergic rhinitis trigger, unspecified rhinitis seasonality:   Diagnosis management comments: DDx: allergic rhinitis/allergies, uri, sprain, strain, strep unlikely    LABORATORY TESTS:  No results found for this or any previous visit (from the past 12 hour(s)). IMAGING RESULTS:  No orders to display    MEDICATIONS GIVEN:  Medications - No data to display    IMPRESSION:  Allergic rhinitis, unspecified allergic rhinitis trigger, unspecified rhinitis seasonality  (primary encounter diagnosis)    PLAN:  1. Current Discharge Medication List    START taking these medications    cetirizine (ZYRTEC) 10 mg tablet  Take 1 Tab by mouth daily for 30 days. Qty: 30 Tab Refills: 0      CONTINUE these medications which have NOT CHANGED    levonorgestrel (MIRENA) 20 mcg/24 hr (5 years) IUD  1 Each by IntraUTERine route once. Indications: PREGNANCY CONTRACEPTION        2.  Follow-up Information     Follow up With Details Comments [address] MD Yasmin Schedule an appointment as soon as possible for a   visit in 1 week As needed, If symptoms worsen [address] [phone]      [facility] Schedule an appointment as soon as possible   for a visit in 1 week As needed, If symptoms worsen [address] [facility] 29[address] [facility] [address] [facility] DEPT OF OTOLARYNGOLOGY Schedule an appointment as soon as   possible for a visit in 1 week As needed, If symptoms worsen [address] [facility] [address] [facility] [address]  [phone]      Return to ED if worse               Patient Progress  Patient progress: stable    ED Course       Procedures    2:45 PM  I have discussed with patient their diagnosis, treatment, and follow up plan. The patient agrees to follow up as outlined in discharge paperwork and also to return to the ED with any worsening.  Poli Angeles PA-C

## 2017-09-21 ENCOUNTER — HOSPITAL ENCOUNTER (EMERGENCY)
Age: 27
Discharge: STILL ADMITTED OR ADMINISTRATIVE | End: 2017-09-21
Attending: EMERGENCY MEDICINE

## 2017-09-26 ENCOUNTER — CLINICAL SUPPORT (OUTPATIENT)
Dept: SURGERY | Age: 27
End: 2017-09-26

## 2017-09-26 DIAGNOSIS — E66.01 MORBID OBESITY WITH BMI OF 50.0-59.9, ADULT (HCC): Primary | ICD-10-CM

## 2017-09-27 VITALS — BODY MASS INDEX: 53.25 KG/M2 | WEIGHT: 293 LBS

## 2017-09-27 NOTE — PROGRESS NOTES
[id number] [facility]  Supervised Weight Loss     Date:   2017    Patient's Name: Jessica Remy  : 1990    Insurance:  [facility]          Session: 2 of  3  Surgery: Sleeve Gastrectomy  Surgeon:  Marcela Cantrell M.D. Height: 65\"   Weight:    320      Lbs. BMI: 53   Pounds Lost since last month: 0               Pounds Gained since last month: 9#    Starting Weight: 311#   Previous Months Weight: 311#  Overall Pounds Lost: 0  Overall Pounds Gained: 9#    Other Pertinent Information: n/a     Smoking Status:  none  Alcohol Intake: none    I have reviewed with patient the guidelines of the supervised weight loss class. Patient understands the expectations of some weight loss during the weight loss trial.  Patient understands that weight gain could delay the process. I have also expressed to patient that classes need to be consecutive. Missing a class may subject patient to have to start their trial over. Patient has received this information in writing. Changes that patient has made since last month include:  Less eating out, cooking less fried foods. Eating Habits and Behaviors      Today we reviewed the general diet principles for weight loss surgery. A nutrition education lesson was provided specifically on portion control both before and after surgery. Their plate should be made up of 1/2 coming from non-starchy vegetables, 1/4 coming from lean meat, and 1/4 of their plate coming from carbohydrates, including fruits, starches, or milk. We discussed the importance of measuring meals to 1/2 cup (4 oz) after surgery to prevent overeating. Food models and measuring utensils were used to demonstrated portion control. Emphasis was placed on the importance of eating 3 meals a day and aiming for 60 grams of protein per day.  We talked about cooking more meals at home, less eating out, reduced intake of sweets and added sugars and drinking only calorie-free, non-carbonated fluids. Patient's current diet habits include: eating 2 meals per day. Snacking on fruit and chips. Eating chips, bread, pasta, rice and cereal 2 times per week. Eating a mixture of baked, grilled, broiled and fried foods. Eating out is 1-3 times per week. Drinking 12 oz water, 4 oz caffeine and 8 oz juice daily. Sometimes emotional eating and yes to situational eating. Packing meals to take to work. Eating most meals at a table and takes 10 minutes or less to finish the meal. Reports overeating, night eating, food choices and lack of activity are biggest barriers to weight loss. Physical Activity/Exercise  During class we discussed the importance of increasing daily physical activity and beginning to develop an exercise regimen/routine. An education lesson was provided including exercise programs and resources, tips for getting started and overcoming barriers and health benefits of exercise. We discussed that exercise is an important part of long term weight loss. Comments:  During class, I discussed with patient the importance of getting into an exercise routine. Patient is currently walking for activity. Patient has been encouraged to maintain and increase as tolerated. Work up to 150 minutes per week to promote weight loss. Behavior Modification       Comments: We discussed the importance of eating mindfully after weight loss surgery to prevent food intolerance and prevent weight regain. We talked about how to eat more mindfully and identify emotional eating triggers. Tips and recommendations for how to make these changes were provided. Patient was encouraged to keep a food journal and record what they were taking in daily. Overall Assessment: Patient has demonstrated appropriate lifestyle changes mostly evidenced by reported changes. Patient will need to demonstrate weight loss in addition to reported changes as she gained weight this past month. Appears to understand the general nutrition guidelines. Will continue to assess as she works to complete supervised weight loss requirements. Goals:   1. Nutrition - learn to eat healthy, portion control and moderation  2. Exercise - 150 minutes per week  3.  Behavior -be more supportive to myself    Arti Monahan, KIKA  9/27/2017

## 2017-11-09 ENCOUNTER — CLINICAL SUPPORT (OUTPATIENT)
Dept: SURGERY | Age: 27
End: 2017-11-09

## 2017-11-09 VITALS — BODY MASS INDEX: 51.42 KG/M2 | WEIGHT: 293 LBS

## 2017-11-09 DIAGNOSIS — E66.01 MORBID OBESITY WITH BMI OF 50.0-59.9, ADULT (HCC): Primary | ICD-10-CM

## 2017-11-09 NOTE — PROGRESS NOTES
[id number] [facility] at [facility]  Supervised Weight Loss     Date:   2017    Patient's Name: Buddy Dickson  : 1990    Insurance:  [facility]          Session: 3 of  3 (needs one more month to make 90 days)   Surgery: Sleeve Gastrectomy  Surgeon:  Everton Andres M.D. Height: 65\"   Weight:    309      Lbs. BMI: 51   Pounds Lost since last month: 11#               Pounds Gained since last month: 0    Starting Weight: 311#   Previous Months Weight: 320#  Overall Pounds Lost: 2#  Overall Pounds Gained: 0    Other Pertinent Information:  N/a     Smoking Status:  none  Alcohol Intake: 2-3 drinks, \"not often\"    I have reviewed with patient the guidelines of the supervised weight loss class. Patient understands the expectations of some weight loss during the weight loss trial.  Patient understands that weight gain could delay the process. I have also expressed to patient that classes need to be consecutive. Missing a class may subject patient to have to start their trial over. Patient has received this information in writing. Changes that patient has made since last month include:  Eating healthier, drinking protein shakes, smaller portions. Eating Habits and Behaviors      Today we reviewed the general diet principles for weight loss surgery. An education lesson was provided specific to carbohydrates. We discussed the difference between refined and complex carbohydrates, label reading to identify added sugars vs natural sugars and portion control of carbohydrates. Their plate should be made up of 1/2 coming from non-starchy vegetables, 1/4 coming from lean meat, and 1/4 of their plate coming from carbohydrates, including fruits, starches, or milk. We discussed the importance of measuring meals to 1/2 cup (4 oz) after surgery to prevent overeating. Emphasis was placed on the importance of eating 3 meals a day and aiming for 60 grams of protein per day.  We talked about cooking more meals at home, less eating out, reduced intake of sweets and added sugars and drinking only calorie-free, non-carbonated fluids. Patient's current diet habits include: eating 1 meal per day and using 2 protein shakes per day. Denies snacking. Eating bread and cereal once a week. Avoiding sweets and desserts. Eating baked, grilled and broiled foods. Eating out is never. Drinking 64 oz water daily and 24 oz fruit smoothies per week. Sometimes emotional and situational eating. Packing meals when away from home. Eating most meals at a table and takes 10-15 minutes to finish the meal. Reports night eating and portion sizes are biggest barriers to weight loss at this time. Physical Activity/Exercise  During class we discussed the importance of increasing daily physical activity and beginning to develop an exercise regimen/routine. We discussed that exercise is an important part of long term weight loss. Comments:  During class, I discussed with patient the importance of getting into an exercise routine. Patient is currently walking and lifting weights for activity. Patient has been encouraged to maintain and eventually increase as tolerated. Behavior Modification       Comments: We discussed the importance of eating mindfully after weight loss surgery to prevent food intolerance and prevent weight regain. We talked about how to eat more mindfully and identify emotional eating triggers. Tips and recommendations for how to make these changes were provided. Patient was encouraged to keep a food journal and record what they were taking in daily. Overall Assessment: Patient demonstrates appropriate lifestyle changes this past month evidenced by reported changes and weight loss. Will continue to assess as pt works to complete supervised weight loss requirements. Patient-Set Goals:   1. Nutrition - continue current eating habits from this past month   2.  Exercise - increase exercise, be consistent  3.  Behavior -be proud of current changes/choices    Kevon Kruse, KIKA  11/9/2017

## 2017-12-12 ENCOUNTER — HOSPITAL ENCOUNTER (EMERGENCY)
Age: 27
Discharge: HOME | End: 2017-12-12
Attending: EMERGENCY MEDICINE
Payer: COMMERCIAL

## 2017-12-12 VITALS
SYSTOLIC BLOOD PRESSURE: 133 MMHG | RESPIRATION RATE: 16 BRPM | WEIGHT: 285 LBS | TEMPERATURE: 97.9 F | BODY MASS INDEX: 47.48 KG/M2 | DIASTOLIC BLOOD PRESSURE: 97 MMHG | HEART RATE: 88 BPM | OXYGEN SATURATION: 99 % | HEIGHT: 65 IN

## 2017-12-12 DIAGNOSIS — H66.90 ACUTE OTITIS MEDIA, UNSPECIFIED OTITIS MEDIA TYPE: Primary | ICD-10-CM

## 2017-12-12 PROCEDURE — 99282 EMERGENCY DEPT VISIT SF MDM: CPT

## 2017-12-12 RX ORDER — LORATADINE AND PSEUDOEPHEDRINE 10; 240 MG/1; MG/1
1 TABLET, EXTENDED RELEASE ORAL DAILY
Qty: 10 TAB | Refills: 0 | Status: SHIPPED | OUTPATIENT
Start: 2017-12-12 | End: 2018-11-15

## 2017-12-12 RX ORDER — AMOXICILLIN 875 MG/1
875 TABLET, FILM COATED ORAL 2 TIMES DAILY
Qty: 20 TAB | Refills: 0 | Status: SHIPPED | OUTPATIENT
Start: 2017-12-12 | End: 2017-12-22

## 2017-12-12 NOTE — ED PROVIDER NOTES
Patient is a 32 y.o. female presenting with ear congestion. The history is provided by the patient. Ear Fullness    This is a new problem. Episode onset: pt reports 3 days of feeling like hear ears are clogged L>R. The problem occurs constantly. The problem has not changed since onset. Patient complains that both ears are affected. There has been no fever. The pain is at a severity of 4/10. The pain is mild. Associated symptoms include hearing loss, rhinorrhea and sore throat. Pertinent negatives include no ear discharge and no cough. Past Medical History:   Diagnosis Date    Bronchitis     Dyspepsia and other specified disorders of function of stomach        Past Surgical History:   Procedure Laterality Date    HX GYN      c section x2         Family History:   Problem Relation Age of Onset    Diabetes Mother    [facility] Elevated Lipids Maternal Grandmother     Psychiatric Disorder Maternal Grandmother     Heart Disease Maternal Grandfather     Psychiatric Disorder Maternal Grandfather        Social History     Social History    Marital status: SINGLE     Spouse name: N/A    Number of children: N/A    Years of education: N/A     Occupational History    Not on file. Social History Main Topics    Smoking status: Never Smoker    Smokeless tobacco: Never Used    Alcohol use Yes      Comment: occ    Drug use: No    Sexual activity: Yes     Partners: Male     Birth control/ protection: Implant     Other Topics Concern    Not on file     Social History Narrative         ALLERGIES: Review of patient's allergies indicates no known allergies. Review of Systems   HENT: Positive for ear pain, hearing loss, rhinorrhea and sore throat. Negative for ear discharge. Respiratory: Negative for cough. All other systems reviewed and are negative.       Vitals:    12/12/17 1504   BP: (!) 133/97   Pulse: 88   Resp: 16   Temp: 97.9 °F (36.6 °C)   SpO2: 99%   Weight: 129.3 kg (285 lb)   Height: 5' 5\" (1.651 m)            Physical Exam   Constitutional: She is oriented to person, place, and time. She appears well-developed and well-nourished. No distress. HENT:   Head: Normocephalic and atraumatic. Right Ear: Tympanic membrane is erythematous (mildly). Left Ear: Tympanic membrane is erythematous (mildly). Mouth/Throat: Uvula is midline, oropharynx is clear and moist and mucous membranes are normal.   Eyes: Conjunctivae are normal.   Cardiovascular: Normal rate, regular rhythm and normal heart sounds. Pulmonary/Chest: Effort normal and breath sounds normal. No respiratory distress. She has no wheezes. She has no rales. Neurological: She is alert and oriented to person, place, and time. Skin: Skin is warm and dry. Psychiatric: She has a normal mood and affect. Her behavior is normal. Judgment and thought content normal.   Nursing note and vitals reviewed. at 3:15 PM      MDM  Number of Diagnoses or Management Options  Acute otitis media, unspecified otitis media type:   Diagnosis management comments: DDX: OM, OE, otalgia, URI    ED Course       Procedures      PROGRESS NOTE:  A/P  3:15 PM   The patient's signs, symptoms, diagnosis, and discharge instruction have been discussed and the patient has conveyed their understanding. The patient is to follow up with PCP  or return to the ER should their symptoms worsen. Plan has been discussed and the patient is in agreement. Pt is ready for discharge      DIAGNOSIS:  1. Acute otitis media, unspecified otitis media type        PLAN:  Follow-up Information     Follow up With Details Comments Contact Info    Lois Freeman MD Schedule an appointment as soon as possible for a visit in 1 week As needed [facility]  [phone]          Current Discharge Medication List      START taking these medications    Details   amoxicillin (AMOXIL) 875 mg tablet Take 1 Tab by mouth two (2) times a day for 10 days.   Qty: 20 Tab, Refills: 0 loratadine-pseudoephedrine (CLARITIN-D 24 HOUR)  mg per tablet Take 1 Tab by mouth daily.   Qty: 10 Tab, Refills: 0

## 2017-12-12 NOTE — DISCHARGE INSTRUCTIONS
Ear Infection (Otitis Media): Care Instructions  Your Care Instructions    An ear infection may start with a cold and affect the middle ear (otitis media). It can hurt a lot. Most ear infections clear up on their own in a couple of days. Most often you will not need antibiotics. This is because many ear infections are caused by a virus. Antibiotics don't work against a virus. Regular doses of pain medicines are the best way to reduce your fever and help you feel better. Follow-up care is a key part of your treatment and safety. Be sure to make and go to all appointments, and call your doctor if you are having problems. It's also a good idea to know your test results and keep a list of the medicines you take. How can you care for yourself at home? · Take pain medicines exactly as directed. ¨ If the doctor gave you a prescription medicine for pain, take it as prescribed. ¨ If you are not taking a prescription pain medicine, take an over-the-counter medicine, such as acetaminophen (Tylenol), ibuprofen (Advil, Motrin), or naproxen (Aleve). Read and follow all instructions on the label. ¨ Do not take two or more pain medicines at the same time unless the doctor told you to. Many pain medicines have acetaminophen, which is Tylenol. Too much acetaminophen (Tylenol) can be harmful. · Plan to take a full dose of pain reliever before bedtime. Getting enough sleep will help you get better. · Try a warm, moist washcloth on the ear. It may help relieve pain. · If your doctor prescribed antibiotics, take them as directed. Do not stop taking them just because you feel better. You need to take the full course of antibiotics. When should you call for help? Call your doctor now or seek immediate medical care if:  ? · You have new or increasing ear pain. ? · You have new or increasing pus or blood draining from your ear. ? · You have a fever with a stiff neck or a severe headache. ? Watch closely for changes in your health, and be sure to contact your doctor if:  ? · You have new or worse symptoms. ? · You are not getting better after taking an antibiotic for 2 days. Where can you learn more? Go to http://han-katia.info/. Enter B675 in the search box to learn more about \"Ear Infection (Otitis Media): Care Instructions. \"  Current as of: May 12, 2017  Content Version: 11.4  © 4895-1832 [employer], [employer]. Care instructions adapted under license by [employer] (which disclaims liability or warranty for this information). If you have questions about a medical condition or this instruction, always ask your healthcare professional. [place] [address] any warranty or liability for your use of this information.

## 2017-12-12 NOTE — ED NOTES
Pt given printed discharge instructions and 2 script(s). Pt verbalized understanding of instructions and script(s). Pt verbalized importance of following up with PCP. Pt alert and oriented, in no acute distress, ambulatory with self.

## 2017-12-12 NOTE — ED NOTES
Emergency Department Nursing Plan of Care       The Nursing Plan of Care is developed from the Nursing assessment and Emergency Department Attending provider initial evaluation. The plan of care may be reviewed in the ED Provider note.     The Plan of Care was developed with the following considerations:   Patient / Family readiness to learn indicated by:verbalized understanding  Persons(s) to be included in education: patient  Barriers to Learning/Limitations:No    Signed     Yarely Ni RN    12/12/2017   3:22 PM

## 2017-12-12 NOTE — ED TRIAGE NOTES
C/o left ear pain/fullness x 2-3 days, \"feels like it's clogged,\" denies recent injury/trauma, uses q-tips for ear cleaning

## 2018-01-23 ENCOUNTER — CLINICAL SUPPORT (OUTPATIENT)
Dept: SURGERY | Age: 28
End: 2018-01-23

## 2018-01-23 VITALS — WEIGHT: 293 LBS | BODY MASS INDEX: 50.59 KG/M2

## 2018-01-23 DIAGNOSIS — E66.01 MORBID OBESITY WITH BMI OF 50.0-59.9, ADULT (HCC): Primary | ICD-10-CM

## 2018-01-23 NOTE — PROGRESS NOTES
[id number] [facility] at [facility]  Supervised Weight Loss     Date:   2018    Patient's Name: Tejal Herrera  : 1990    Insurance:  [facility]          Session: 4 of  3  Surgery: Sleeve Gastrectomy  Surgeon:  Lonnie Dexter M.D. Height: 65\"   Weight:    304      Lbs. BMI: 50   Pounds Lost since last month: 5#               Pounds Gained since last month: 0    Starting Weight: 311#   Previous Months Weight: 309#  Overall Pounds Lost: 7#  Overall Pounds Gained: 0    Other Pertinent Information: n/a     Smoking Status:  none  Alcohol Intake: none    I have reviewed with patient the guidelines of the supervised weight loss class. Patient understands the expectations of some weight loss during the weight loss trial.  Patient understands that weight gain could delay the process. I have also expressed to patient that classes need to be consecutive. Missing a class may subject patient to have to start their trial over. Patient has received this information in writing. Changes that patient has made since last month include:  Decreased intake of fast food and fried foods, more cooking at home, more veggies, decreased sodas and juices, more water, tried protein shakes for tolerance. Eating Habits and Behaviors  Today we reviewed the general diet principles for weight loss surgery. Their plate should be made up of 1/2 coming from non-starchy vegetables, 1/4 coming from lean meat, and 1/4 of their plate coming from carbohydrates, including fruits, starches, or milk. Emphasis was placed on the importance of eating 3 meals a day and aiming for 60 grams of protein per day. I educated the patient on limiting liquid calories and drinking only calorie-free, sugar-free and non-carbonated beverages. We discussed the importance of drinking 64 ounces of fluid per day to prevent dehydration post-operatively.  A nutrition education lesson regarding vitamin and mineral supplements was provided and the importance of preventing nutrient deficiencies post-operatively. Patient's current diet habits include: eating 3 meals a day. Occasionally skipping meals if not feeling hungry but tries to drink a protein shake instead of skipping. Drinking more water and avoiding sodas and reduced intake of juices. Eating out is once every 2 weeks and avoiding fried foods. Cooking more at home with baked,grilled and broiled foods. Physical Activity/Exercise  During class we discussed the importance of increasing daily physical activity and beginning to develop an exercise regimen/routine. We discussed that exercise is an important part of long term weight loss. Comments:  During class, I discussed with patient the importance of getting into an exercise routine. Patient is currently walking on treadmill and using elliptical for activity. Patient has been encouraged to maintain and increase intensity, frequency or duration of exercise to promote long term wt loss. Behavior Modification       Comments: We discussed the importance of eating mindfully after weight loss surgery to prevent food intolerance and prevent weight regain. We talked about how to eat more mindfully and identify emotional eating triggers. Tips and recommendations for how to make these changes were provided. Patient was encouraged to keep a food journal and record what they were taking in daily. Overall Assessment: Patient's weight over the past 5 months has fluctuated up and down often d/t inconsistent lifestyle changes often influenced by traveling for work. Pt reports today she was \"doing really well\" with exercise for a while and then fell off track d/t travel for work. Patient has demonstrated appropriate lifestyle changes in preparation for weight loss surgery. Appears biggest challenge will be maintaining long term and consistent lifestyle changes.  Appears to understand the general nutrition guidelines and appears to be an appropriate candidate at this time. Patient-Set Goals:   1. Nutrition - maintain 3 meals a day and do not skip meals; continue to avoid liquid calories and avoidance of carbonated beverages   2. Exercise - resume exercise; pt states purchasing a treadmill for home for more convenience and being more consistent with exercise   3.  Behavior - sipping fluids throughout the day, practice not drinking with meals     Felipe Beavers, KIKA  1/23/2018

## 2018-02-12 ENCOUNTER — HOSPITAL ENCOUNTER (EMERGENCY)
Age: 28
Discharge: HOME | End: 2018-02-12
Attending: EMERGENCY MEDICINE | Admitting: EMERGENCY MEDICINE
Payer: COMMERCIAL

## 2018-02-12 VITALS
TEMPERATURE: 98.4 F | SYSTOLIC BLOOD PRESSURE: 145 MMHG | DIASTOLIC BLOOD PRESSURE: 93 MMHG | HEIGHT: 65 IN | OXYGEN SATURATION: 100 % | BODY MASS INDEX: 48.82 KG/M2 | RESPIRATION RATE: 15 BRPM | WEIGHT: 293 LBS | HEART RATE: 86 BPM

## 2018-02-12 DIAGNOSIS — K52.9 GASTROENTERITIS, ACUTE: Primary | ICD-10-CM

## 2018-02-12 LAB
APPEARANCE UR: ABNORMAL
BACTERIA URNS QL MICRO: ABNORMAL /HPF
BILIRUB UR QL: NEGATIVE
COLOR UR: ABNORMAL
EPITH CASTS URNS QL MICRO: ABNORMAL /LPF
GLUCOSE UR STRIP.AUTO-MCNC: NEGATIVE MG/DL
HCG UR QL: NEGATIVE
HGB UR QL STRIP: ABNORMAL
KETONES UR QL STRIP.AUTO: NEGATIVE MG/DL
LEUKOCYTE ESTERASE UR QL STRIP.AUTO: NEGATIVE
NITRITE UR QL STRIP.AUTO: NEGATIVE
PH UR STRIP: 7 [PH] (ref 5–8)
PROT UR STRIP-MCNC: NEGATIVE MG/DL
RBC #/AREA URNS HPF: ABNORMAL /HPF (ref 0–5)
SP GR UR REFRACTOMETRY: 1.01 (ref 1–1.03)
UA: UC IF INDICATED,UAUC: ABNORMAL
UROBILINOGEN UR QL STRIP.AUTO: 1 EU/DL (ref 0.2–1)
WBC URNS QL MICRO: ABNORMAL /HPF (ref 0–4)

## 2018-02-12 PROCEDURE — 87086 URINE CULTURE/COLONY COUNT: CPT | Performed by: EMERGENCY MEDICINE

## 2018-02-12 PROCEDURE — 81025 URINE PREGNANCY TEST: CPT

## 2018-02-12 PROCEDURE — 74011250637 HC RX REV CODE- 250/637: Performed by: PHYSICIAN ASSISTANT

## 2018-02-12 PROCEDURE — 99284 EMERGENCY DEPT VISIT MOD MDM: CPT

## 2018-02-12 PROCEDURE — 81001 URINALYSIS AUTO W/SCOPE: CPT | Performed by: EMERGENCY MEDICINE

## 2018-02-12 RX ORDER — ONDANSETRON 4 MG/1
4 TABLET, ORALLY DISINTEGRATING ORAL
Status: COMPLETED | OUTPATIENT
Start: 2018-02-12 | End: 2018-02-12

## 2018-02-12 RX ORDER — ACETAMINOPHEN 325 MG/1
1000 TABLET ORAL
Qty: 20 TAB | Refills: 0 | Status: SHIPPED | OUTPATIENT
Start: 2018-02-12 | End: 2018-04-11

## 2018-02-12 RX ORDER — ONDANSETRON 4 MG/1
4 TABLET, ORALLY DISINTEGRATING ORAL
Qty: 8 TAB | Refills: 0 | Status: SHIPPED | OUTPATIENT
Start: 2018-02-12 | End: 2018-04-11

## 2018-02-12 RX ORDER — ACETAMINOPHEN 500 MG
1000 TABLET ORAL
Status: COMPLETED | OUTPATIENT
Start: 2018-02-12 | End: 2018-02-12

## 2018-02-12 RX ADMIN — ACETAMINOPHEN 1000 MG: 500 TABLET, FILM COATED ORAL at 18:29

## 2018-02-12 RX ADMIN — ONDANSETRON 4 MG: 4 TABLET, ORALLY DISINTEGRATING ORAL at 18:29

## 2018-02-12 NOTE — ED PROVIDER NOTES
EMERGENCY DEPARTMENT HISTORY AND PHYSICAL EXAM    Date: 2/12/2018  Patient Name: Pedro Castano    History of Presenting Illness     Chief Complaint   Patient presents with    Vomiting    Diarrhea         History Provided By: Patient    Chief Complaint: n/v/d  Duration: 1 Days  Timing:  Acute  Location: headache  Quality: Aching  Severity: 8 out of 10  Modifying Factors: aleve without relief  Associated Symptoms: denies any other associated signs or symptoms      HPI: Pedro Castano is a 32 y.o. female with a PMH of obesity and bronchitis, dyspepsia who presents with headache, n/v/d x 1 day. Dneies fever, chills, abd pain, urinary sxs, hematochezia, ear pain, sore throat, rhinorrhea, cp, sob, wheezing, numbness/tingling, vision changes, neck pain/stiffness. Family members with similar sxs recently. PCP: Rakesh Pacheco MD    Current Outpatient Prescriptions   Medication Sig Dispense Refill    ondansetron (ZOFRAN ODT) 4 mg disintegrating tablet Take 1 Tab by mouth every eight (8) hours as needed for Nausea. 8 Tab 0    acetaminophen (TYLENOL) 325 mg tablet Take 3 Tabs by mouth every six (6) hours as needed for Pain. 20 Tab 0    loratadine-pseudoephedrine (CLARITIN-D 24 HOUR)  mg per tablet Take 1 Tab by mouth daily. 10 Tab 0    levonorgestrel (MIRENA) 20 mcg/24 hr (5 years) IUD 1 Each by IntraUTERine route once.  Indications: PREGNANCY CONTRACEPTION         Past History     Past Medical History:  Past Medical History:   Diagnosis Date    Bronchitis     Dyspepsia and other specified disorders of function of stomach        Past Surgical History:  Past Surgical History:   Procedure Laterality Date    HX GYN      c section x2       Family History:  Family History   Problem Relation Age of Onset    Diabetes Mother     Elevated Lipids Maternal Grandmother     Psychiatric Disorder Maternal Grandmother     Heart Disease Maternal Grandfather     Psychiatric Disorder Maternal Grandfather        Social History:  Social History   Substance Use Topics    Smoking status: Never Smoker    Smokeless tobacco: Never Used    Alcohol use Yes      Comment: occ       Allergies:  No Known Allergies      Review of Systems   Review of Systems   Constitutional: Negative for activity change, chills, diaphoresis, fatigue and fever. HENT: Positive for congestion. Negative for dental problem, ear pain, facial swelling, rhinorrhea, sinus pain, sinus pressure, sneezing and sore throat. Eyes: Negative for photophobia, pain and visual disturbance. Respiratory: Negative for apnea, cough, chest tightness and shortness of breath. Cardiovascular: Negative for chest pain and palpitations. Gastrointestinal: Positive for diarrhea, nausea and vomiting. Negative for abdominal distention, abdominal pain, anal bleeding, blood in stool and constipation. Genitourinary: Negative. Musculoskeletal: Negative. Skin: Negative. Negative for pallor. Neurological: Positive for headaches. Negative for dizziness, syncope, facial asymmetry, weakness, light-headedness and numbness. Psychiatric/Behavioral: Negative. Physical Exam     Vitals:    02/12/18 1739   BP: (!) 145/93   Pulse: 86   Resp: 15   Temp: 98.4 °F (36.9 °C)   SpO2: 100%   Weight: 137.1 kg (302 lb 5 oz)   Height: 5' 5\" (1.651 m)     Physical Exam   Constitutional: She is oriented to person, place, and time. She appears well-developed and well-nourished. No distress. Overweight pt resting comforatbly in NAD. HENT:   Head: Normocephalic and atraumatic. Right Ear: Hearing, tympanic membrane, external ear and ear canal normal.   Left Ear: Hearing, tympanic membrane, external ear and ear canal normal.   Nose: Mucosal edema present. No rhinorrhea. Right sinus exhibits no maxillary sinus tenderness and no frontal sinus tenderness. Left sinus exhibits no maxillary sinus tenderness and no frontal sinus tenderness.    Mouth/Throat: Uvula is midline, oropharynx is clear and moist and mucous membranes are normal. Mucous membranes are not dry. No trismus in the jaw. No oropharyngeal exudate, posterior oropharyngeal edema, posterior oropharyngeal erythema or tonsillar abscesses. Eyes: Conjunctivae and EOM are normal. Pupils are equal, round, and reactive to light. Neck: Normal range of motion and full passive range of motion without pain. Cardiovascular: Normal rate, regular rhythm, normal heart sounds and intact distal pulses. Exam reveals no gallop and no friction rub. No murmur heard. Pulmonary/Chest: Effort normal and breath sounds normal. No respiratory distress. She has no decreased breath sounds. She has no wheezes. She has no rhonchi. She has no rales. She exhibits no tenderness. Abdominal: Soft. Bowel sounds are normal. She exhibits no distension and no mass. There is tenderness. There is no rigidity, no rebound, no guarding, no CVA tenderness, no tenderness at McBurney's point and negative Acevedo's sign. Musculoskeletal: Normal range of motion. Neurological: She is alert and oriented to person, place, and time. She has normal strength. No cranial nerve deficit or sensory deficit. GCS eye subscore is 4. GCS verbal subscore is 5. GCS motor subscore is 6. Skin: Skin is warm and dry. She is not diaphoretic. Psychiatric: She has a normal mood and affect. Her behavior is normal. Judgment and thought content normal.   Nursing note and vitals reviewed.         Diagnostic Study Results     Labs -     Recent Results (from the past 12 hour(s))   URINALYSIS W/ REFLEX CULTURE    Collection Time: 02/12/18  6:30 PM   Result Value Ref Range    Color YELLOW/STRAW      Appearance CLOUDY (A) CLEAR      Specific gravity 1.015 1.003 - 1.030      pH (UA) 7.0 5.0 - 8.0      Protein NEGATIVE  NEG mg/dL    Glucose NEGATIVE  NEG mg/dL    Ketone NEGATIVE  NEG mg/dL    Bilirubin NEGATIVE  NEG      Blood MODERATE (A) NEG      Urobilinogen 1.0 0.2 - 1.0 EU/dL    Nitrites NEGATIVE  NEG Leukocyte Esterase NEGATIVE  NEG      WBC 0-4 0 - 4 /hpf    RBC 5-10 0 - 5 /hpf    Epithelial cells MANY (A) FEW /lpf    Bacteria 4+ (A) NEG /hpf    UA:UC IF INDICATED URINE CULTURE ORDERED (A) CNI     HCG URINE, QL. - POC    Collection Time: 02/12/18  6:35 PM   Result Value Ref Range    Pregnancy test,urine (POC) NEGATIVE  NEG         Radiologic Studies -   No orders to display     CT Results  (Last 48 hours)    None        CXR Results  (Last 48 hours)    None            Medical Decision Making   I am the first provider for this patient. I reviewed the vital signs, available nursing notes, past medical history, past surgical history, family history and social history. Vital Signs-Reviewed the patient's vital signs. Records Reviewed: Nursing Notes and Old Medical Records    ED Course:   7:12 PM  Pt resting comfortably in room in NAD. No new symptoms or complaints at this time. Available labs/ results reviewed with pt. Tolerating PO fluids and ready for discharge. Urine sample contaminated (+ epithelial cells). No abx at this time. Disposition:    DISCHARGE NOTE:   7:12 PM      Care plan outlined and precautions discussed. Patient has no new complaints, changes, or physical findings. Results of labs were reviewed with the patient. All medications were reviewed with the patient; will d/c home with zofran and tylenol. All of pt's questions and concerns were addressed. Patient was instructed and agrees to follow up with PCP, as well as to return to the ED upon further deterioration. Patient is ready to go home.     Follow-up Information     Follow up With Details Comments Contact Info    Nkechi Soto MD Schedule an appointment as soon as possible for a visit in 1 week As needed, If symptoms worsen [facility]  [phone]            Current Discharge Medication List      START taking these medications    Details   ondansetron (ZOFRAN ODT) 4 mg disintegrating tablet Take 1 Tab by mouth every eight (8) hours as needed for Nausea. Qty: 8 Tab, Refills: 0      acetaminophen (TYLENOL) 325 mg tablet Take 3 Tabs by mouth every six (6) hours as needed for Pain. Qty: 20 Tab, Refills: 0         CONTINUE these medications which have NOT CHANGED    Details   loratadine-pseudoephedrine (CLARITIN-D 24 HOUR)  mg per tablet Take 1 Tab by mouth daily. Qty: 10 Tab, Refills: 0      levonorgestrel (MIRENA) 20 mcg/24 hr (5 years) IUD 1 Each by IntraUTERine route once. Indications: PREGNANCY CONTRACEPTION             Provider Notes (Medical Decision Making):   DDx: flu, acute gastroenteritis, uri, viral syndrome, uti, pregnancy, pyelo unlikely    Procedures:  Procedures        Diagnosis     Clinical Impression:   1.  Gastroenteritis, acute

## 2018-02-12 NOTE — ED NOTES
Pt c/o nausea,vomiting,diarrhea x 2 days,denies any blood in stool and emesis. Emergency Department Nursing Plan of Care       The Nursing Plan of Care is developed from the Nursing assessment and Emergency Department Attending provider initial evaluation. The plan of care may be reviewed in the ED Provider note.     The Plan of Care was developed with the following considerations:   Patient / Family readiness to learn indicated by:verbalized understanding  Persons(s) to be included in education: patient  Barriers to Learning/Limitations:No    Signed     Felix Gonzales RN    2/12/2018   6:16 PM

## 2018-02-12 NOTE — LETTER
[facility] EMERGENCY DEPT 
[address] [facility] [id number] [phone] Work/School Note Date: 2/12/2018 To Whom It May concern: 
 
Svetlana Dukes was seen and treated today in the emergency room by the following provider(s): 
Attending Provider: Lisa Stephens MD 
Physician Assistant: Mathew Rocha PA-C. Svetlana Dukes may return to work on 2/14/2018. Sincerely, Mathew Rocha PA-C

## 2018-02-12 NOTE — ED TRIAGE NOTES
Per patient, comes to the ED for treatment of nausea, vomiting, diarrhea and headache x 2 days.   Says family member had the same

## 2018-02-13 LAB
BACTERIA SPEC CULT: ABNORMAL
CC UR VC: ABNORMAL
SERVICE CMNT-IMP: ABNORMAL

## 2018-02-13 NOTE — DISCHARGE INSTRUCTIONS
Gastroenteritis: Care Instructions  Your Care Instructions    Gastroenteritis is an illness that may cause nausea, vomiting, and diarrhea. It is sometimes called \"stomach flu. \" It can be caused by bacteria or a virus. You will probably begin to feel better in 1 to 2 days. In the meantime, get plenty of rest and make sure you do not become dehydrated. Dehydration occurs when your body loses too much fluid. Follow-up care is a key part of your treatment and safety. Be sure to make and go to all appointments, and call your doctor if you are having problems. It's also a good idea to know your test results and keep a list of the medicines you take. How can you care for yourself at home? · If your doctor prescribed antibiotics, take them as directed. Do not stop taking them just because you feel better. You need to take the full course of antibiotics. · Drink plenty of fluids to prevent dehydration, enough so that your urine is light yellow or clear like water. Choose water and other caffeine-free clear liquids until you feel better. If you have kidney, heart, or liver disease and have to limit fluids, talk with your doctor before you increase your fluid intake. · Drink fluids slowly, in frequent, small amounts, because drinking too much too fast can cause vomiting. · Begin eating mild foods, such as dry toast, yogurt, applesauce, bananas, and rice. Avoid spicy, hot, or high-fat foods, and do not drink alcohol or caffeine for a day or two. Do not drink milk or eat ice cream until you are feeling better. How to prevent gastroenteritis  · Keep hot foods hot and cold foods cold. · Do not eat meats, dressings, salads, or other foods that have been kept at room temperature for more than 2 hours. · Use a thermometer to check your refrigerator. It should be between 34°F and 40°F.  · Defrost meats in the refrigerator or microwave, not on the kitchen counter. · Keep your hands and your kitchen clean.  Wash your hands, cutting boards, and countertops with hot soapy water frequently. · Cook meat until it is well done. · Do not eat raw eggs or uncooked sauces made with raw eggs. · Do not take chances. If food looks or tastes spoiled, throw it out. When should you call for help? Call 911 anytime you think you may need emergency care. For example, call if:  ? · You vomit blood or what looks like coffee grounds. ? · You passed out (lost consciousness). ? · You pass maroon or very bloody stools. ?Call your doctor now or seek immediate medical care if:  ? · You have severe belly pain. ? · You have signs of needing more fluids. You have sunken eyes, a dry mouth, and pass only a little dark urine. ? · You feel like you are going to faint. ? · You have increased belly pain that does not go away in 1 to 2 days. ? · You have new or increased nausea, or you are vomiting. ? · You have a new or higher fever. ? · Your stools are black and tarlike or have streaks of blood. ? Watch closely for changes in your health, and be sure to contact your doctor if:  ? · You are dizzy or lightheaded. ? · You urinate less than usual, or your urine is dark yellow or brown. ? · You do not feel better with each day that goes by. Where can you learn more? Go to http://han-katia.info/. Enter N142 in the search box to learn more about \"Gastroenteritis: Care Instructions. \"  Current as of: March 3, 2017  Content Version: 11.4  © 1434-1268 [employer]. Care instructions adapted under license by [employer] (which disclaims liability or warranty for this information). If you have questions about a medical condition or this instruction, always ask your healthcare professional. [address] any warranty or liability for your use of this information.

## 2018-02-14 ENCOUNTER — OFFICE VISIT (OUTPATIENT)
Dept: SURGERY | Age: 28
End: 2018-02-14

## 2018-02-14 VITALS
DIASTOLIC BLOOD PRESSURE: 76 MMHG | OXYGEN SATURATION: 98 % | HEART RATE: 86 BPM | RESPIRATION RATE: 16 BRPM | BODY MASS INDEX: 48.82 KG/M2 | HEIGHT: 65 IN | WEIGHT: 293 LBS | TEMPERATURE: 98.3 F | SYSTOLIC BLOOD PRESSURE: 130 MMHG

## 2018-02-14 DIAGNOSIS — E66.01 MORBID OBESITY WITH BMI OF 50.0-59.9, ADULT (HCC): Primary | ICD-10-CM

## 2018-02-14 DIAGNOSIS — K21.9 GASTROESOPHAGEAL REFLUX DISEASE, ESOPHAGITIS PRESENCE NOT SPECIFIED: ICD-10-CM

## 2018-02-14 NOTE — PROGRESS NOTES
1. Have you been to the ER, urgent care clinic since your last visit? Hospitalized since your last visit? [facility] ED 2/12/18 fever,ha,nausea    2. Have you seen or consulted any other health care providers outside of the [address] [place] [address] since your last visit? Include any pap smears or colon screening.    no

## 2018-02-14 NOTE — MR AVS SNAPSHOT
[facility] [address] [place] [address] 7 [id number] [phone] Patient: Donna Haley MRN: [id number] JMR:[id number] Visit Information Date & Time Provider Department Dept. Phone Encounter #  
 2/14/2018  3:20 PM Santino Short MD 1001 [facility] [phone] [id number] Follow-up Instructions Routing History Upcoming Health Maintenance Date Due DTaP/Tdap/Td series (1 - Tdap) 5/17/2011 PAP AKA CERVICAL CYTOLOGY 5/17/2011 Influenza Age 5 to Adult 8/1/2017 Allergies as of 2/14/2018  Review Complete On: 2/14/2018 By: Abiodun Grier LPN No Known Allergies Current Immunizations  Never Reviewed No immunizations on file. Not reviewed this visit You Were Diagnosed With   
  
 Codes Comments Morbid obesity with BMI of 50.0-59.9, adult [facility]    -  Primary ICD-10-CM: E66.01, Z68.43 
ICD-9-CM: 278.01, V85.43 Gastroesophageal reflux disease, esophagitis presence not specified     ICD-10-CM: K21.9 ICD-9-CM: 530.81 Vitals BP Pulse Temp Resp Height(growth percentile) Weight(growth percentile) 130/76 86 98.3 °F (36.8 °C) 16 5' 5\" (1.651 m) 306 lb 8 oz (139 kg) SpO2 BMI OB Status Smoking Status 98% 51 kg/m2 IUD Never Smoker Vitals History BMI and BSA Data Body Mass Index Body Surface Area 51 kg/m 2 2.52 m 2 Preferred Pharmacy Pharmacy Name Phone [id number] [address] [phone] Your Updated Medication List  
  
   
This list is accurate as of: 2/14/18 11:59 PM.  Always use your most recent med list.  
  
  
  
  
 acetaminophen 325 mg tablet Commonly known as:  TYLENOL Take 3 Tabs by mouth every six (6) hours as needed for Pain.  
  
 loratadine-pseudoephedrine  mg per tablet Commonly known as:  CLARITIN-D 24 HOUR Take 1 Tab by mouth daily. MIRENA 20 mcg/24 hr (5 years) Iud  
Generic drug:  levonorgestrel 1 Each by IntraUTERine route once. Indications: PREGNANCY CONTRACEPTION  
  
 ondansetron 4 mg disintegrating tablet Commonly known as:  ZOFRAN ODT Take 1 Tab by mouth every eight (8) hours as needed for Nausea. Patient Instructions Learning About How to Prepare for [address] [place] [address] Surgery How can you prepare for weight-loss surgery? Having weight-loss surgery (also called bariatric surgery) is a big step. You can prepare for surgery by having a plan. Your plan may include your goals for losing weight and how to makes changes in your diet, activity, and lifestyle to help raise your chances of success. One way to prepare for surgery is to think about your goal or reason why you want to reach a healthy weight. Do you want to lower your blood pressure, cholesterol, or blood sugar? Do you want to be able to sleep better, play with your kids, or walk around the block? Having a reason can help you stay with your plan and meet your goals. Your weight-loss surgery team can help you meet your goals and get ready for surgery. Ira Daily work with a team that's trained to help you lose weight and make healthy changes in your life. This team may include: · A medical doctor or nurse to help manage your care and schedule tests before surgery. · A surgeon who specializes in weight-loss surgery. · A registered dietitian to help you plan meals and make changes in the way you eat. · An exercise specialist to help you be more active and get stronger. · A therapist or counselor to help you learn why you eat and teach you ways to deal with stress and your emotions. Your team will also be there to help you prepare for life after surgery. They will help you adjust to new ways of eating and changes to your body. How will weight-loss surgery affect your life?  
You have likely thought a lot about how surgery may affect your life-how you will eat, how your body will look, or how you will feel. Some people feel overwhelmed with these changes. But planning can help you prepare for the changes and meet your weight-loss goals. One important step in your plan is to learn about the ways surgery will affect your life. These may include: · A slimmer you. You probably will lose weight very quickly in the first few months after surgery. As time goes on, your weight loss will slow down. How much weight you lose depends on what type of surgery you had and how well your new eating and activity plans are working for you. · A new way of eating. Success in reaching and keeping a healthy weight depends on making lifelong changes in how you eat. After surgery, you raise your chances of success if you: 
¨ Eat just a few ounces of food at a time. ¨ Eat very slowly and chew your food to mush. ¨ Don't drink for 30 minutes before you eat, during your meal, and for 30 minutes after you eat. ¨ Are careful about drinking alcohol. ¨ Avoid foods that are high in fat or sugar. ¨ Take vitamin and mineral supplements. · A healthier you. Weight-loss surgery can have some real health benefits. Problems like diabetes, high blood pressure, and sleep apnea may go away-or at least become easier to manage. · A more active you. After surgery, being active on most days of the week will help you reach your weight goal and avoid gaining back the weight you lose. · A lot of extra skin. When you lose weight quickly, you may have a lot of extra skin. That's normal. You can have surgery to remove the extra skin if it bothers you. There are going to be some ups and downs while you get used to these changes. So another way to adjust is to identify who can help support you. Getting support from friends and family can help. And joining a support group for people who have had the surgery can be a big help too, because they know what you're going through. As you know, it's a big decision to have weight-loss surgery. But when you have a plan, you can focus on losing weight and living a healthier life. So what steps can you take to prepare for weight-loss surgery? Will you set some goals? Will you learn about how surgery can affect your life? How about asking family or friends for help? Write out your plan. Then get ready. Where can you learn more? Go to http://han-katia.info/. Enter B076 in the search box to learn more about \"Learning About How to Prepare for Weight-Loss Surgery. \" Current as of: October 13, 2016 Content Version: 11.4 © 0846-7314 [employer]. Care instructions adapted under license by [employer] (which disclaims liability or warranty for this information). If you have questions about a medical condition or this instruction, always ask your healthcare professional. [address] any warranty or liability for your use of this information. Introducing [facility]! [facility] introduces [employer] patient portal. Now you can access parts of your medical record, email your doctor's office, and request medication refills online. 1. In your internet browser, go to [email or web address] 2. Click on the First Time User? Click Here link in the Sign In box. You will see the New Member Sign Up page. 3. Enter your [employer] Access Code exactly as it appears below. You will not need to use this code after youve completed the sign-up process. If you do not sign up before the expiration date, you must request a new code. · [employer] Access Code: [email or web address] Expires: 3/31/2018 10:01 PM 
 
4. Enter the last four digits of your Social Security Number (xxxx) and Date of Birth (mm/dd/yyyy) as indicated and click Submit. You will be taken to the next sign-up page. 5. Create a [employer] ID.  This will be your [employer] login ID and cannot be changed, so think of one that is secure and easy to remember. 6. Create a [employer] password. You can change your password at any time. 7. Enter your Password Reset Question and Answer. This can be used at a later time if you forget your password. 8. Enter your e-mail address. You will receive e-mail notification when new information is available in [address] 9. Click Sign Up. You can now view and download portions of your medical record. 10. Click the Download Summary menu link to download a portable copy of your medical information. If you have questions, please visit the Frequently Asked Questions section of the [employer] website. Remember, [employer] is NOT to be used for urgent needs. For medical emergencies, dial 911. Now available from your iPhone and Android! Please provide this summary of care documentation to your next provider. Your primary care clinician is listed as Glenn Hoffman. If you have any questions after today's visit, please call [phone].

## 2018-02-14 NOTE — LETTER
2/18/2018 9:18 PM 
 
Patient:  Isaiah Higginbotham YOB: 1990 Date of Visit: 2/14/2018 Dear Michael Mishra MD 
[address] [address] VIA In Basket 
 : Thank you for referring Ms. Jerman Henley to me for evaluation/treatment. Below are the relevant portions of my assessment and plan of care. If you have questions, please do not hesitate to call me. I look forward to following Ms. Sandra Rubio along with you. Sincerely, Alfa Yañez MD

## 2018-02-19 NOTE — PROGRESS NOTES
Bariatric Surgery Evaluation    Boyd Norman is a 32 y.o. female with a history of morbid obesity. Her Height: 5' 5\" (165.1 cm), Weight: 306 lb 8 oz (139 kg). Body mass index is 51 kg/(m^2). She reports that she has been trying to lose weight for 7 years. Her maximum weight was 310 pounds. She has attended our bariatric surgery information seminar. Zohreh Carr wants to consider laparoscopic sleeve gastrectomy. She had a preop EGD which was normal.GERD, chronic back problems and obstructive sleep apnea  Pt is referred by:  Colonel Gaby MD.        Comorbidities:     Bariatric comorbidities present: GERD, chronic back problems and obstructive sleep apnea    Ambulatory status: independent    The patient's reported level of exercise: moderately active. Patient Active Problem List    Diagnosis Date Noted    Morbid obesity with BMI of 50.0-59.9, adult ([facility] 07/21/2017    GERD (gastroesophageal reflux disease) 07/21/2017     Past Medical History:   Diagnosis Date    Bronchitis     Dyspepsia and other specified disorders of function of stomach       Past Surgical History:   Procedure Laterality Date    HX GYN      c section x2      Social History   Substance Use Topics    Smoking status: Never Smoker    Smokeless tobacco: Never Used    Alcohol use Yes      Comment: occ      Family History   Problem Relation Age of Onset    Diabetes Mother     Elevated Lipids Maternal Grandmother     Psychiatric Disorder Maternal Grandmother     Heart Disease Maternal Grandfather     Psychiatric Disorder Maternal Grandfather       . Current Outpatient Prescriptions   Medication Sig    acetaminophen (TYLENOL) 325 mg tablet Take 3 Tabs by mouth every six (6) hours as needed for Pain.  loratadine-pseudoephedrine (CLARITIN-D 24 HOUR)  mg per tablet Take 1 Tab by mouth daily.  levonorgestrel (MIRENA) 20 mcg/24 hr (5 years) IUD 1 Each by IntraUTERine route once.  Indications: PREGNANCY CONTRACEPTION    ondansetron (ZOFRAN ODT) 4 mg disintegrating tablet Take 1 Tab by mouth every eight (8) hours as needed for Nausea. No current facility-administered medications for this visit. No Known Allergies      Review of Systems:    Constitutional: negative  Ears, Nose, Mouth, Throat, and Face: negative  Respiratory: negative  Cardiovascular: negative  Gastrointestinal: negative  Genitourinary:negative  Integument/Breast: negative  Hematologic/Lymphatic: negative  Musculoskeletal:positive for back pain  Neurological: negative  Behavioral/Psychiatric: negative    Objective:     Visit Vitals    /76    Pulse 86    Temp 98.3 °F (36.8 °C)    Resp 16    Ht 5' 5\" (1.651 m)    Wt 306 lb 8 oz (139 kg)    SpO2 98%    BMI 51 kg/m2        Physical Exam:    General:  alert, no distress, morbidly obese   Eyes:  conjunctivae and sclerae normal, pupils equal, round, reactive to light, extraocular movements intact without nystagmus   Throat & Neck: no erythema or exudates noted and neck supple and symmetrical; no palpable masses   Lungs:   clear to auscultation bilaterally   Heart:  Regular rate and rhythm   Abdomen:   obese, soft, nontender, nondistended, no masses or organomegaly,    Extremities: no edema,  no gait disturbances   Skin: Normal.       Assessment:     1. Morbid obesity (Body mass index is 51 kg/(m^2). ) with multiple comorbidities. The patient meets criteria established by the NIH for weight loss surgery candidates. Without weight reduction, co-morbidities will escalate as well as increase risk of early mortality. Our recommendation is the patient could be served with laparoscopic sleeve gastrectomy. I explained to the patient differences between laparoscopic gastric bypass, laparoscopic adjustable gastric banding, and laparoscopic vertical sleeve gastrectomy with respect to expected weight loss, resolution of comorbidities and risks.  Ms. Lesley Guanakito has attended one our informational meetings and has seen our educational materials. She has requested Dr. Fernando Carrillo to perform her procedure. I reviewed the role for this procedure as a tool to help her achieve her weight loss goals. I reminded her that effective weight loss comes from lifelong adherence to changes in dietary choices, eating habits and exercise. Recommendation: We will request approval for laparoscopic sleeve gastrectomy. We recommend that the patient undergo the following evaluations prior to considering surgery:      She has completed her preop requirements and is a good candidate for laparoscopic sleeve gastrectomy. Her EGD preop was normal.      Signed By: Rosemary Davis MD     February 18, 2018       Greater than half of the time: 30 minutes was used in counciling the patient about bariatric surgery and the steps she needs to take to move forward with her surgery. Ms. Alexandria Butts has a reminder for a \"due or due soon\" health maintenance. I have asked that she contact her primary care provider for follow-up on this health maintenance.

## 2018-02-19 NOTE — PATIENT INSTRUCTIONS
Learning About How to Prepare for Weight-Loss Surgery  How can you prepare for weight-loss surgery? Having weight-loss surgery (also called bariatric surgery) is a big step. You can prepare for surgery by having a plan. Your plan may include your goals for losing weight and how to makes changes in your diet, activity, and lifestyle to help raise your chances of success. One way to prepare for surgery is to think about your goal or reason why you want to reach a healthy weight. Do you want to lower your blood pressure, cholesterol, or blood sugar? Do you want to be able to sleep better, play with your kids, or walk around the block? Having a reason can help you stay with your plan and meet your goals. Your weight-loss surgery team can help you meet your goals and get ready for surgery. Kamran Corley work with a team that's trained to help you lose weight and make healthy changes in your life. This team may include:  · A medical doctor or nurse to help manage your care and schedule tests before surgery. · A surgeon who specializes in weight-loss surgery. · A registered dietitian to help you plan meals and make changes in the way you eat. · An exercise specialist to help you be more active and get stronger. · A therapist or counselor to help you learn why you eat and teach you ways to deal with stress and your emotions. Your team will also be there to help you prepare for life after surgery. They will help you adjust to new ways of eating and changes to your body. How will weight-loss surgery affect your life? You have likely thought a lot about how surgery may affect your life-how you will eat, how your body will look, or how you will feel. Some people feel overwhelmed with these changes. But planning can help you prepare for the changes and meet your weight-loss goals. One important step in your plan is to learn about the ways surgery will affect your life. These may include:  · A slimmer you.  You probably will lose weight very quickly in the first few months after surgery. As time goes on, your weight loss will slow down. How much weight you lose depends on what type of surgery you had and how well your new eating and activity plans are working for you. · A new way of eating. Success in reaching and keeping a healthy weight depends on making lifelong changes in how you eat. After surgery, you raise your chances of success if you:  ¨ Eat just a few ounces of food at a time. ¨ Eat very slowly and chew your food to mush. ¨ Don't drink for 30 minutes before you eat, during your meal, and for 30 minutes after you eat. ¨ Are careful about drinking alcohol. ¨ Avoid foods that are high in fat or sugar. ¨ Take vitamin and mineral supplements. · A healthier you. Weight-loss surgery can have some real health benefits. Problems like diabetes, high blood pressure, and sleep apnea may go away-or at least become easier to manage. · A more active you. After surgery, being active on most days of the week will help you reach your weight goal and avoid gaining back the weight you lose. · A lot of extra skin. When you lose weight quickly, you may have a lot of extra skin. That's normal. You can have surgery to remove the extra skin if it bothers you. There are going to be some ups and downs while you get used to these changes. So another way to adjust is to identify who can help support you. Getting support from friends and family can help. And joining a support group for people who have had the surgery can be a big help too, because they know what you're going through. As you know, it's a big decision to have weight-loss surgery. But when you have a plan, you can focus on losing weight and living a healthier life. So what steps can you take to prepare for weight-loss surgery? Will you set some goals? Will you learn about how surgery can affect your life? How about asking family or friends for help? Write out your plan.  Then get ready. Where can you learn more? Go to http://han-katia.info/. Enter V678 in the search box to learn more about \"Learning About How to Prepare for Weight-Loss Surgery. \"  Current as of: October 13, 2016  Content Version: 11.4  © 6988-4979 [employer]. Care instructions adapted under license by [employer] (which disclaims liability or warranty for this information). If you have questions about a medical condition or this instruction, always ask your healthcare professional. [address] any warranty or liability for your use of this information.

## 2018-03-19 ENCOUNTER — TELEPHONE (OUTPATIENT)
Dept: SURGERY | Age: 28
End: 2018-03-19

## 2018-03-19 NOTE — TELEPHONE ENCOUNTER
Writer tried all 3 phone numbers to make patient aware she needs to sign a authorization to release medical information for her [facility] paperwork.

## 2018-03-26 ENCOUNTER — TELEPHONE (OUTPATIENT)
Dept: SURGERY | Age: 28
End: 2018-03-26

## 2018-03-26 NOTE — TELEPHONE ENCOUNTER
Have returned call x2 and left message that patient needs to sign authorization to release medical information-[facility] paperes have been completed.

## 2018-03-27 ENCOUNTER — TELEPHONE (OUTPATIENT)
Dept: SURGERY | Age: 28
End: 2018-03-27

## 2018-04-02 ENCOUNTER — TELEPHONE (OUTPATIENT)
Dept: SURGERY | Age: 28
End: 2018-04-02

## 2018-04-11 ENCOUNTER — HOSPITAL ENCOUNTER (OUTPATIENT)
Dept: PREADMISSION TESTING | Age: 28
Discharge: HOME | End: 2018-04-11
Payer: COMMERCIAL

## 2018-04-11 ENCOUNTER — HOSPITAL ENCOUNTER (OUTPATIENT)
Dept: GENERAL RADIOLOGY | Age: 28
Discharge: HOME | End: 2018-04-11
Payer: COMMERCIAL

## 2018-04-11 ENCOUNTER — OFFICE VISIT (OUTPATIENT)
Dept: SURGERY | Age: 28
End: 2018-04-11

## 2018-04-11 VITALS
DIASTOLIC BLOOD PRESSURE: 83 MMHG | OXYGEN SATURATION: 98 % | HEIGHT: 65 IN | RESPIRATION RATE: 20 BRPM | HEART RATE: 84 BPM | WEIGHT: 293 LBS | TEMPERATURE: 97.9 F | BODY MASS INDEX: 48.82 KG/M2 | SYSTOLIC BLOOD PRESSURE: 126 MMHG

## 2018-04-11 VITALS
WEIGHT: 293 LBS | HEART RATE: 84 BPM | SYSTOLIC BLOOD PRESSURE: 126 MMHG | HEIGHT: 65 IN | TEMPERATURE: 97.9 F | DIASTOLIC BLOOD PRESSURE: 83 MMHG | BODY MASS INDEX: 48.82 KG/M2

## 2018-04-11 VITALS
OXYGEN SATURATION: 98 % | DIASTOLIC BLOOD PRESSURE: 83 MMHG | WEIGHT: 293 LBS | TEMPERATURE: 97.9 F | BODY MASS INDEX: 48.82 KG/M2 | HEIGHT: 65 IN | SYSTOLIC BLOOD PRESSURE: 126 MMHG | RESPIRATION RATE: 18 BRPM | HEART RATE: 84 BPM

## 2018-04-11 DIAGNOSIS — Z01.818 PREOP GENERAL PHYSICAL EXAM: ICD-10-CM

## 2018-04-11 DIAGNOSIS — E66.01 MORBID OBESITY WITH BMI OF 50.0-59.9, ADULT (HCC): Primary | ICD-10-CM

## 2018-04-11 DIAGNOSIS — K21.9 GASTROESOPHAGEAL REFLUX DISEASE WITHOUT ESOPHAGITIS: ICD-10-CM

## 2018-04-11 DIAGNOSIS — E66.01 MORBID OBESITY (HCC): Primary | ICD-10-CM

## 2018-04-11 PROBLEM — D50.9 IRON DEFICIENCY ANEMIA: Status: ACTIVE | Noted: 2018-04-11

## 2018-04-11 LAB
ALBUMIN SERPL-MCNC: 3.2 G/DL (ref 3.5–5)
ALBUMIN/GLOB SERPL: 0.8 {RATIO} (ref 1.1–2.2)
ALP SERPL-CCNC: 79 U/L (ref 45–117)
ALT SERPL-CCNC: 20 U/L (ref 12–78)
ANION GAP SERPL CALC-SCNC: 4 MMOL/L (ref 5–15)
AST SERPL-CCNC: 8 U/L (ref 15–37)
ATRIAL RATE: 81 BPM
BASOPHILS # BLD: 0 K/UL (ref 0–0.1)
BASOPHILS NFR BLD: 0 % (ref 0–1)
BILIRUB SERPL-MCNC: 0.4 MG/DL (ref 0.2–1)
BUN SERPL-MCNC: 9 MG/DL (ref 6–20)
BUN/CREAT SERPL: 13 (ref 12–20)
CALCIUM SERPL-MCNC: 8.8 MG/DL (ref 8.5–10.1)
CALCULATED P AXIS, ECG09: 47 DEGREES
CALCULATED T AXIS, ECG11: 25 DEGREES
CHLORIDE SERPL-SCNC: 108 MMOL/L (ref 97–108)
CO2 SERPL-SCNC: 29 MMOL/L (ref 21–32)
CREAT SERPL-MCNC: 0.68 MG/DL (ref 0.55–1.02)
DIAGNOSIS, 93000: NORMAL
DIFFERENTIAL METHOD BLD: ABNORMAL
EOSINOPHIL # BLD: 0.1 K/UL (ref 0–0.4)
EOSINOPHIL NFR BLD: 2 % (ref 0–7)
ERYTHROCYTE [DISTWIDTH] IN BLOOD BY AUTOMATED COUNT: 20.1 % (ref 11.5–14.5)
GLOBULIN SER CALC-MCNC: 4 G/DL (ref 2–4)
GLUCOSE SERPL-MCNC: 107 MG/DL (ref 65–100)
HCG SERPL QL: NEGATIVE
HCT VFR BLD AUTO: 38.1 % (ref 35–47)
HGB BLD-MCNC: 11.2 G/DL (ref 11.5–16)
IMM GRANULOCYTES # BLD: 0 K/UL (ref 0–0.04)
IMM GRANULOCYTES NFR BLD AUTO: 0 % (ref 0–0.5)
INR PPP: 1 (ref 0.9–1.1)
IRON SERPL-MCNC: 23 UG/DL (ref 35–150)
LDH SERPL L TO P-CCNC: 150 U/L (ref 81–246)
LYMPHOCYTES # BLD: 3 K/UL (ref 0.8–3.5)
LYMPHOCYTES NFR BLD: 41 % (ref 12–49)
MAGNESIUM SERPL-MCNC: 1.9 MG/DL (ref 1.6–2.4)
MCH RBC QN AUTO: 21 PG (ref 26–34)
MCHC RBC AUTO-ENTMCNC: 29.4 G/DL (ref 30–36.5)
MCV RBC AUTO: 71.3 FL (ref 80–99)
MONOCYTES # BLD: 0.6 K/UL (ref 0–1)
MONOCYTES NFR BLD: 8 % (ref 5–13)
NEUTS SEG # BLD: 3.5 K/UL (ref 1.8–8)
NEUTS SEG NFR BLD: 49 % (ref 32–75)
NRBC # BLD: 0 K/UL (ref 0–0.01)
NRBC BLD-RTO: 0 PER 100 WBC
P-R INTERVAL, ECG05: 134 MS
PLATELET # BLD AUTO: 342 K/UL (ref 150–400)
PMV BLD AUTO: 10.2 FL (ref 8.9–12.9)
POTASSIUM SERPL-SCNC: 4.6 MMOL/L (ref 3.5–5.1)
PROT SERPL-MCNC: 7.2 G/DL (ref 6.4–8.2)
PROTHROMBIN TIME: 10.5 SEC (ref 9–11.1)
Q-T INTERVAL, ECG07: 388 MS
QRS DURATION, ECG06: 80 MS
QTC CALCULATION (BEZET), ECG08: 450 MS
RBC # BLD AUTO: 5.34 M/UL (ref 3.8–5.2)
RBC MORPH BLD: ABNORMAL
SODIUM SERPL-SCNC: 141 MMOL/L (ref 136–145)
VENTRICULAR RATE, ECG03: 81 BPM
WBC # BLD AUTO: 7.2 K/UL (ref 3.6–11)

## 2018-04-11 PROCEDURE — 85025 COMPLETE CBC W/AUTO DIFF WBC: CPT | Performed by: SURGERY

## 2018-04-11 PROCEDURE — 84703 CHORIONIC GONADOTROPIN ASSAY: CPT | Performed by: SURGERY

## 2018-04-11 PROCEDURE — 85610 PROTHROMBIN TIME: CPT | Performed by: SURGERY

## 2018-04-11 PROCEDURE — 83615 LACTATE (LD) (LDH) ENZYME: CPT | Performed by: SURGERY

## 2018-04-11 PROCEDURE — 93005 ELECTROCARDIOGRAM TRACING: CPT

## 2018-04-11 PROCEDURE — 86677 HELICOBACTER PYLORI ANTIBODY: CPT | Performed by: SURGERY

## 2018-04-11 PROCEDURE — 83540 ASSAY OF IRON: CPT | Performed by: SURGERY

## 2018-04-11 PROCEDURE — 83735 ASSAY OF MAGNESIUM: CPT | Performed by: SURGERY

## 2018-04-11 PROCEDURE — 71046 X-RAY EXAM CHEST 2 VIEWS: CPT

## 2018-04-11 PROCEDURE — 80053 COMPREHEN METABOLIC PANEL: CPT | Performed by: SURGERY

## 2018-04-11 RX ORDER — OMEPRAZOLE 20 MG/1
20 CAPSULE, DELAYED RELEASE ORAL DAILY
Qty: 30 CAP | Refills: 2 | Status: SHIPPED | OUTPATIENT
Start: 2018-04-11

## 2018-04-11 RX ORDER — ENOXAPARIN SODIUM 100 MG/ML
40 INJECTION SUBCUTANEOUS DAILY
Qty: 14 SYRINGE | Refills: 0 | Status: SHIPPED | OUTPATIENT
Start: 2018-04-11 | End: 2018-04-25

## 2018-04-11 RX ORDER — HYOSCYAMINE SULFATE 0.12 MG/1
0.12 TABLET SUBLINGUAL
Qty: 30 TAB | Refills: 0 | Status: SHIPPED | OUTPATIENT
Start: 2018-04-11

## 2018-04-11 RX ORDER — ONDANSETRON 4 MG/1
4 TABLET, ORALLY DISINTEGRATING ORAL
Qty: 30 TAB | Refills: 0 | Status: SHIPPED | OUTPATIENT
Start: 2018-04-11

## 2018-04-11 NOTE — PROGRESS NOTES
1. Have you been to the ER, urgent care clinic since your last visit? Hospitalized since your last visit?  no    2. Have you seen or consulted any other health care providers outside of the [facility] since your last visit? Include any pap smears or colon screening.    no

## 2018-04-11 NOTE — LETTER
4/30/2018 9:38 PM 
 
Patient:  Zachariah Agarwal YOB: 1990 Date of Visit: 4/11/2018 Dear Basilia Juarez MD 
[address] [address] [place] [address] [address] VIA In Basket 
 : Thank you for referring Ms. Lianna Castro to me for evaluation/treatment. Below are the relevant portions of my assessment and plan of care. If you have questions, please do not hesitate to call me. I look forward to following Ms. Laila Sherman along with you. Sincerely, Sanam Liu MD

## 2018-04-11 NOTE — PATIENT INSTRUCTIONS
Sleeve Gastrectomy: Before Your Surgery  What is a sleeve gastrectomy? Sleeve gastrectomy is surgery to remove part of the stomach. It helps with weight loss. The surgery limits the amount of food your stomach can hold. This can help you eat less and feel full sooner. The surgery is usually done through several small cuts in the belly. These cuts are called incisions. The doctor will place small surgical tools and a camera, called a laparoscope, through the incisions. The doctor will separate the upper part of your stomach from the rest of your stomach. This forms a small pouch. The pouch will hold the food you eat. The doctor will close the cuts in your belly with stitches or surgical staples. These will be removed 7 to 10 days after surgery, unless your doctor uses stitches that dissolve. The incisions will leave scars that fade with time. Most people go home about 2 days after surgery. You will probably need to take 2 to 4 weeks off from work. It depends on the type of work you do and how you feel. Follow-up care is a key part of your treatment and safety. Be sure to make and go to all appointments, and call your doctor if you are having problems. It's also a good idea to know your test results and keep a list of the medicines you take. What happens before surgery? ?Surgery can be stressful. This information will help you understand what you can expect. And it will help you safely prepare for surgery. ? Preparing for surgery  ? · Understand exactly what surgery is planned, along with the risks, benefits, and other options. · Tell your doctors ALL the medicines, vitamins, supplements, and herbal remedies you take. Some of these can increase the risk of bleeding or interact with anesthesia. ? · If you take blood thinners, such as warfarin (Coumadin), clopidogrel (Plavix), or aspirin, be sure to talk to your doctor.  He or she will tell you if you should stop taking these medicines before your surgery. Make sure that you understand exactly what your doctor wants you to do.   ? · Your doctor will tell you which medicines to take or stop before your surgery. You may need to stop taking certain medicines a week or more before surgery. So talk to your doctor as soon as you can.   ? · If you have an advance directive, let your doctor know. It may include a living will and a durable power of  for health care. Bring a copy to the hospital. If you don't have one, you may want to prepare one. It lets your doctor and loved ones know your health care wishes. Doctors advise that everyone prepare these papers before any type of surgery or procedure. What happens on the day of surgery? · Follow the instructions exactly about when to stop eating and drinking. If you don't, your surgery may be canceled. If your doctor told you to take your medicines on the day of surgery, take them with only a sip of water. ? · Take a bath or shower before you come in for your surgery. Do not apply lotions, perfumes, deodorants, or nail polish. ? · Do not shave the surgical site yourself. ? · Take off all jewelry and piercings. And take out contact lenses, if you wear them. ? At the hospital or surgery center   · Bring a picture ID. ? · The area for surgery is often marked to make sure there are no errors. ? · You will be kept comfortable and safe by your anesthesia provider. You will be asleep during the surgery. ? · The surgery will take about 1 to 3 hours. ? · After surgery, the bowel usually \"rests\" for a few days before it starts working again. You may have a thin plastic tube in your nose that goes into your stomach. This tube drains stomach juices and prevents nausea. The drainage usually looks green, brown, or even black with flecks of blood. The tube will be removed in a few days, after your bowels start working again. After the tube is removed, you can start drinking and eating again.    Going home · Be sure you have someone to drive you home. Anesthesia and pain medicine make it unsafe for you to drive. ? · You will be given more specific instructions about recovering from your surgery. They will cover things like diet, wound care, follow-up care, driving, and getting back to your normal routine. When should you call your doctor? · You have questions or concerns. ? · You don't understand how to prepare for your surgery. ? · You become ill before the surgery (such as fever, flu, or a cold). ? · You need to reschedule or have changed your mind about having the surgery. Where can you learn more? Go to http://han-katia.info/. Enter Z927 in the search box to learn more about \"Sleeve Gastrectomy: Before Your Surgery. \"  Current as of: October 13, 2016  Content Version: 11.4  © 9514-7004 Healthwise, Incorporated. Care instructions adapted under license by [employer] (which disclaims liability or warranty for this information). If you have questions about a medical condition or this instruction, always ask your healthcare professional. [address] any warranty or liability for your use of this information.

## 2018-04-11 NOTE — PROGRESS NOTES
1. Have you been to the ER, urgent care clinic since your last visit? Hospitalized since your last visit? No    2. Have you seen or consulted any other health care providers outside of the [address] [place] [address] since your last visit? Include any pap smears or colon screening.  No

## 2018-04-11 NOTE — PROGRESS NOTES
Bariatric Surgery History and Physical  Elaine Sood is a 32 y.o. female scheduled for laparoscopic sleeve gastrectomy on April 26, 2018 with Dr. Brigette Looney at [facility]. Patient comes in office today for history and physical, and to receive pre-operative liver shrinking diet. Patient height is 5'5'', weight is 312 pounds, BMI is 51.92, frame is medium. Neck circumference is 16.5 inches, waist is 52.5 inches, and hip circumference is 60 inches    HPI     Bariatric comorbidities present: GERD  Patient Active Problem List    Diagnosis Date Noted    Iron deficiency anemia 04/11/2018    Morbid obesity with BMI of 50.0-59.9, adult ([address] 07/21/2017    GERD (gastroesophageal reflux disease) 07/21/2017     Past Medical History:   Diagnosis Date    Arrhythmia     MURMUR AS A CHILD    Bronchitis     Dyspepsia and other specified disorders of function of stomach     GERD (gastroesophageal reflux disease)     Iron deficiency anemia 4/11/2018      Past Surgical History:   Procedure Laterality Date    HX GYN      c section x2      Social History   Substance Use Topics    Smoking status: Never Smoker    Smokeless tobacco: Never Used    Alcohol use Yes      Comment:  1 X A MONTH      Family History   Problem Relation Age of Onset    Diabetes Mother     Elevated Lipids Maternal Grandmother     Psychiatric Disorder Maternal Grandmother     Heart Disease Maternal Grandfather     Psychiatric Disorder Maternal Grandfather     Heart Disease Father       Prior to Admission medications    Medication Sig Start Date End Date Taking? Authorizing Provider   hyoscyamine SL (LEVSIN/SL) 0.125 mg SL tablet 1 Tab by SubLINGual route every four (4) hours as needed for Cramping. 4/11/18  Yes Deng Shafer NP   omeprazole (PRILOSEC) 20 mg capsule Take 1 Cap by mouth daily.  Indications: PREVENTION OF STRESS ULCER 4/11/18  Yes Deng Shafer NP   enoxaparin (LOVENOX) 40 mg/0.4 mL 0.4 mL by SubCUTAneous route daily for 14 days. 4/11/18 4/25/18 Yes Pieter Kocher, NP   ondansetron (ZOFRAN ODT) 4 mg disintegrating tablet Take 1 Tab by mouth every six (6) hours as needed for Nausea. 4/11/18  Yes Pieter Kocher, NP   loratadine-pseudoephedrine (CLARITIN-D 24 HOUR)  mg per tablet Take 1 Tab by mouth daily. 12/12/17  Yes Geno Reyna PA-C   levonorgestrel (MIRENA) 20 mcg/24 hr (5 years) IUD 1 Each by IntraUTERine route once. Indications: PREGNANCY CONTRACEPTION   Yes Phys Other, MD Kendrick Harris MD is primary care provider. No Known Allergies     Review of Systems   Constitutional: Negative for chills, fever and malaise/fatigue. HENT: Negative for congestion, hearing loss and sinus pain. Eyes: Negative for blurred vision, double vision and photophobia. Respiratory: Negative for cough, sputum production, shortness of breath and stridor. Cardiovascular: Negative for chest pain, palpitations and leg swelling. Gastrointestinal: Negative for abdominal pain, blood in stool, constipation, diarrhea, heartburn and nausea. Occasional reflux   Genitourinary: Negative for dysuria and hematuria. No kidney stone history   Musculoskeletal: Positive for joint pain. Negative for back pain, myalgias and neck pain. Skin: Negative for itching and rash. Neurological: Negative for dizziness, seizures and headaches. Endo/Heme/Allergies:        Denies diabetes, no thyroid disease, no gout. History of iron deficiency anemia. Psychiatric/Behavioral: Negative for depression, hallucinations, memory loss, substance abuse and suicidal ideas. The patient is not nervous/anxious and does not have insomnia. Physical Exam   Constitutional: She appears well-developed and well-nourished. No distress. Cardiovascular: Normal rate, regular rhythm, S1 normal, S2 normal and normal pulses. Exam reveals no gallop. Murmur heard.    Systolic murmur is present with a grade of 1/6   Pulmonary/Chest: Effort normal and breath sounds normal. No respiratory distress. She has no wheezes. She has no rales. Abdominal: Soft. Bowel sounds are normal. She exhibits no distension. There is no tenderness. There is no rebound and no guarding. Abdomen\ obese, non-tender, non-distended. Musculoskeletal: Normal range of motion. She exhibits no edema. Lymphadenopathy:        Head (right side): No submental, no submandibular, no tonsillar, no preauricular and no posterior auricular adenopathy present. Head (left side): No submental, no submandibular, no tonsillar, no preauricular and no posterior auricular adenopathy present. She has no cervical adenopathy. She has no axillary adenopathy. Neurological: She is alert. She has normal strength. No cranial nerve deficit or sensory deficit. Skin: Skin is warm and dry. No rash noted. No cyanosis or erythema. Nails show no clubbing. Psychiatric: She has a normal mood and affect. Her behavior is normal. Thought content normal.   Blood pressure 126/83, pulse 84, temperature 97.9 °F (36.6 °C), temperature source Oral, resp. rate 20, height 5' 5\" (1.651 m), weight 312 lb (141.5 kg), SpO2 98 %. ASSESSMENT and PLAN    Morbid obesity with body mass index of 51.92 Co-morbids listed above    Patient is scheduled for laparoscopic sleeve gastrectomy on April 26, 2018 with Dr. Erin Pitt at [facility] patient in regard to post diet restrictions, follow- up office visits, follow- up care, and follow up medications. Prescriptions for Zofran, Levsin, Omeprazole, and Lovenox given. Patient as received educational booklet and vitamin list. Patient to start bariatric vitamin regime. Reviewed available pre-operative labs results. Informed patient iron level and CBC level low, patient to start multivitamin with iron now. Other labs within appropriate range.  Answered all questions, patient wishes    Signed By: Aubrey Walker NP     April 11, 2018       27 minutes was spent with patient.

## 2018-04-11 NOTE — MR AVS SNAPSHOT
[address] [facility] [address] [facility] [address] [address] 
[phone] Patient: Anisha Cannon MRN: [id number] JDA:[birth year] Visit Information Date & Time Provider Department Dept. Phone Encounter #  
 4/11/2018  2:00 PM Rm Anaya NP [facility] [phone] [id number] Your Appointments 4/11/2018  3:00 PM  
ESTABLISHED PATIENT with MD Luz Hernandez [id number] ([address]) Appt Note: sign consents for LSG 4/26/18--see Flakita first  
 [address] [facility] [address] [facility] [address]  
[address] [place] [address] Upcoming Health Maintenance Date Due DTaP/Tdap/Td series (1 - Tdap) 5/17/2011 PAP AKA CERVICAL CYTOLOGY 5/17/2011 Influenza Age 5 to Adult 8/1/2017 Allergies as of 4/11/2018  Review Complete On: 4/11/2018 By: Rm Anaya NP No Known Allergies Current Immunizations  Never Reviewed No immunizations on file. Not reviewed this visit You Were Diagnosed With   
  
 Codes Comments Morbid obesity ([facility]    -  Primary ICD-10-CM: E66.01 
ICD-9-CM: 278.01 Preop general physical exam     ICD-10-CM: E44.946 ICD-9-CM: V72.83 Gastroesophageal reflux disease without esophagitis     ICD-10-CM: K21.9 ICD-9-CM: 530.81 BMI 50.0-59.9, adult [facility])     ICD-10-CM: M64.30 
ICD-9-CM: V85.43 Vitals BP Pulse Temp Resp Height(growth percentile) Weight(growth percentile) 126/83 (BP 1 Location: Left arm, BP Patient Position: Sitting) 84 97.9 °F (36.6 °C) (Oral) 20 5' 5\" (1.651 m) 312 lb (141.5 kg) SpO2 BMI OB Status Smoking Status 98% 51.92 kg/m2 IUD Never Smoker BMI and BSA Data Body Mass Index Body Surface Area 51.92 kg/m 2 2.55 m 2 Preferred Pharmacy Pharmacy Name Phone  [id number] [place], [address] [facility] [phone] Your Updated Medication List  
  
   
This list is accurate as of 18  2:46 PM.  Always use your most recent med list.  
  
  
  
  
 enoxaparin 40 mg/0.4 mL Commonly known as:  LOVENOX  
0.4 mL by SubCUTAneous route daily for 14 days. hyoscyamine SL 0.125 mg SL tablet Commonly known as:  LEVSIN/SL  
1 Tab by SubLINGual route every four (4) hours as needed for Cramping.  
  
 loratadine-pseudoephedrine  mg per tablet Commonly known as:  CLARITIN-D 24 HOUR Take 1 Tab by mouth daily. MIRENA 20 mcg/24 hr (5 years) Iud  
Generic drug:  levonorgestrel 1 Each by IntraUTERine route once. Indications: PREGNANCY CONTRACEPTION  
  
 omeprazole 20 mg capsule Commonly known as:  PRILOSEC Take 1 Cap by mouth daily. Indications: PREVENTION OF STRESS ULCER  
  
 ondansetron 4 mg disintegrating tablet Commonly known as:  ZOFRAN ODT Take 1 Tab by mouth every six (6) hours as needed for Nausea. Prescriptions Sent to Pharmacy Refills  
 hyoscyamine SL (LEVSIN/SL) 0.125 mg SL tablet 0 Si Tab by SubLINGual route every four (4) hours as needed for Cramping. Class: Normal  
 Pharmacy: [address] [place] [address] Ph #: [phone] Route: SubLINGual  
 omeprazole (PRILOSEC) 20 mg capsule 2 Sig: Take 1 Cap by mouth daily. Indications: PREVENTION OF STRESS ULCER Class: Normal  
 Pharmacy: [address] [place] [address] Ph #: [phone] Route: Oral  
 enoxaparin (LOVENOX) 40 mg/0.4 mL 0 Si.4 mL by SubCUTAneous route daily for 14 days. Class: Normal  
 Pharmacy: [address] [place] [address] Ph #: [phone] Route: SubCUTAneous  
 ondansetron (ZOFRAN ODT) 4 mg disintegrating tablet 0 Sig: Take 1 Tab by mouth every six (6) hours as needed for Nausea.   
 Class: Normal  
 Pharmacy: [address] [place] [address] #: [phone] Route: Oral  
  
Patient Instructions Sleeve Gastrectomy: Before Your Surgery What is a sleeve gastrectomy? Sleeve gastrectomy is surgery to remove part of the stomach. It helps with weight loss. The surgery limits the amount of food your stomach can hold. This can help you eat less and feel full sooner. The surgery is usually done through several small cuts in the belly. These cuts are called incisions. The doctor will place small surgical tools and a camera, called a laparoscope, through the incisions. The doctor will separate the upper part of your stomach from the rest of your stomach. This forms a small pouch. The pouch will hold the food you eat. The doctor will close the cuts in your belly with stitches or surgical staples. These will be removed 7 to 10 days after surgery, unless your doctor uses stitches that dissolve. The incisions will leave scars that fade with time. Most people go home about 2 days after surgery. You will probably need to take 2 to 4 weeks off from work. It depends on the type of work you do and how you feel. Follow-up care is a key part of your treatment and safety. Be sure to make and go to all appointments, and call your doctor if you are having problems. It's also a good idea to know your test results and keep a list of the medicines you take. What happens before surgery? ?Surgery can be stressful. This information will help you understand what you can expect. And it will help you safely prepare for surgery. ? Preparing for surgery ? · Understand exactly what surgery is planned, along with the risks, benefits, and other options. · Tell your doctors ALL the medicines, vitamins, supplements, and herbal remedies you take. Some of these can increase the risk of bleeding or interact with anesthesia. ?  · If you take blood thinners, such as warfarin (Coumadin), clopidogrel (Plavix), or aspirin, be sure to talk to your doctor. He or she will tell you if you should stop taking these medicines before your surgery. Make sure that you understand exactly what your doctor wants you to do.  
? · Your doctor will tell you which medicines to take or stop before your surgery. You may need to stop taking certain medicines a week or more before surgery. So talk to your doctor as soon as you can.  
? · If you have an advance directive, let your doctor know. It may include a living will and a durable power of  for health care. Bring a copy to the hospital. If you don't have one, you may want to prepare one. It lets your doctor and loved ones know your health care wishes. Doctors advise that everyone prepare these papers before any type of surgery or procedure. What happens on the day of surgery? · Follow the instructions exactly about when to stop eating and drinking. If you don't, your surgery may be canceled. If your doctor told you to take your medicines on the day of surgery, take them with only a sip of water. ? · Take a bath or shower before you come in for your surgery. Do not apply lotions, perfumes, deodorants, or nail polish. ? · Do not shave the surgical site yourself. ? · Take off all jewelry and piercings. And take out contact lenses, if you wear them. ? At the hospital or surgery center · Bring a picture ID. ? · The area for surgery is often marked to make sure there are no errors. ? · You will be kept comfortable and safe by your anesthesia provider. You will be asleep during the surgery. ? · The surgery will take about 1 to 3 hours. ? · After surgery, the bowel usually \"rests\" for a few days before it starts working again. You may have a thin plastic tube in your nose that goes into your stomach. This tube drains stomach juices and prevents nausea.  The drainage usually looks green, brown, or even black with flecks of blood. The tube will be removed in a few days, after your bowels start working again. After the tube is removed, you can start drinking and eating again. Going home · Be sure you have someone to drive you home. Anesthesia and pain medicine make it unsafe for you to drive. ? · You will be given more specific instructions about recovering from your surgery. They will cover things like diet, wound care, follow-up care, driving, and getting back to your normal routine. When should you call your doctor? · You have questions or concerns. ? · You don't understand how to prepare for your surgery. ? · You become ill before the surgery (such as fever, flu, or a cold). ? · You need to reschedule or have changed your mind about having the surgery. Where can you learn more? Go to http://han-katia.info/. Enter I571 in the search box to learn more about \"Sleeve Gastrectomy: Before Your Surgery. \" Current as of: October 13, 2016 Content Version: 11.4 © 2125-7360 Healthwise, Incorporated. Care instructions adapted under license by [employer] (which disclaims liability or warranty for this information). If you have questions about a medical condition or this instruction, always ask your healthcare professional. [place] [address] any warranty or liability for your use of this information. Introducing [facility]! [employer] introduces [employer] patient portal. Now you can access parts of your medical record, email your doctor's office, and request medication refills online. 1. In your internet browser, go to [email or web address] 2. Click on the First Time User? Click Here link in the Sign In box. You will see the New Member Sign Up page. 3. Enter your [employer] Access Code exactly as it appears below. You will not need to use this code after youve completed the sign-up process.  If you do not sign up before the expiration date, you must request a new code. · [employer] Access Code: [email or web address] Expires: 7/10/2018  7:51 AM 
 
4. Enter the last four digits of your Social Security Number (xxxx) and Date of Birth (mm/dd/yyyy) as indicated and click Submit. You will be taken to the next sign-up page. 5. Create a [employer] ID. This will be your [employer] login ID and cannot be changed, so think of one that is secure and easy to remember. 6. Create a [employer] password. You can change your password at any time. 7. Enter your Password Reset Question and Answer. This can be used at a later time if you forget your password. 8. Enter your e-mail address. You will receive e-mail notification when new information is available in [address] 9. Click Sign Up. You can now view and download portions of your medical record. 10. Click the Download Summary menu link to download a portable copy of your medical information. If you have questions, please visit the Frequently Asked Questions section of the [employer] website. Remember, [employer] is NOT to be used for urgent needs. For medical emergencies, dial 911. Now available from your iPhone and Android! Please provide this summary of care documentation to your next provider. Your primary care clinician is listed as Glenn Hoffman. If you have any questions after today's visit, please call [phone].

## 2018-04-12 LAB — H PYLORI IGG SER IA-ACNC: <0.8 INDEX VALUE (ref 0–0.79)

## 2018-04-12 NOTE — PERIOP NOTES
A MESSAGE WAS SENT TO [employer] [facility] [employer] REGARDING ABN.  IRON AND CBC

## 2018-04-17 ENCOUNTER — DOCUMENTATION ONLY (OUTPATIENT)
Dept: SURGERY | Age: 28
End: 2018-04-17

## 2018-04-20 ENCOUNTER — TELEPHONE (OUTPATIENT)
Dept: SURGERY | Age: 28
End: 2018-04-20

## 2018-04-20 NOTE — TELEPHONE ENCOUNTER
Made Sigrun in PAT's aware per Delfino Shannon FNP just get [facility] stat on day of surgery. Vit D lab-don't worry about.

## 2018-04-20 NOTE — TELEPHONE ENCOUNTER
----- Message from Angie Giron, RN sent at 4/20/2018  3:12 PM EDT -----  Regarding: [facility] AND VITAMIN D  WAS LOOKING OVER PATIENTS CHART AND NOTICED THAT ORDER FOR [facility] AND VITAMIN D WAS MISSED WHEN PATIENT CAME THROUGH PAT. DOES DR. Ji Trevino WANT US TO CALL IN PATIENT BEFORE SURGERY AND HAVE THESE LABS DONE OR IS HE OKAY WITH HAVING IT DONE STAT DAY OF SURGERY? PLEASE GET BACK TO US WITH ANSWER. [address] [facility],  [address] [place] [address].

## 2018-04-20 NOTE — PERIOP NOTES
MESSAGE SENT TO Berenice Ruiz AT DR. Winters Rios OFFICE RE: WAS LOOKING OVER PATIENTS CHART AND NOTICED THAT ORDER FOR [facility] AND VITAMIN D WAS MISSED WHEN PATIENT CAME THROUGH PAT. DOES DR. Teo Silva WANT US TO CALL IN PATIENT BEFORE SURGERY AND HAVE THESE LABS DONE OR IS HE OKAY WITH HAVING IT DONE STAT DAY OF SURGERY? PLEASE GET BACK TO US WITH ANSWER. Berenice Ruiz CALLED BACK AND STATED SHE HAD CONSULTED [place] 2, NP AND SHE CONSULTED NOT TO WORRY ABOUT THE VITAMIN D BUT HAVE [facility] DONE STAT DAY OF SURGERY.

## 2018-04-25 ENCOUNTER — ANESTHESIA EVENT (OUTPATIENT)
Dept: SURGERY | Age: 28
DRG: 621 | End: 2018-04-25
Payer: COMMERCIAL

## 2018-04-26 ENCOUNTER — HOSPITAL ENCOUNTER (INPATIENT)
Age: 28
LOS: 2 days | Discharge: HOME | DRG: 621 | End: 2018-04-28
Attending: SURGERY | Admitting: SURGERY
Payer: COMMERCIAL

## 2018-04-26 ENCOUNTER — ANESTHESIA (OUTPATIENT)
Dept: SURGERY | Age: 28
DRG: 621 | End: 2018-04-26
Payer: COMMERCIAL

## 2018-04-26 DIAGNOSIS — E66.01 MORBID OBESITY WITH BMI OF 50.0-59.9, ADULT (HCC): Primary | ICD-10-CM

## 2018-04-26 LAB — HCG UR QL: NEGATIVE

## 2018-04-26 PROCEDURE — 77030016151 HC PROTCTR LNS DFOG COVD -B: Performed by: SURGERY

## 2018-04-26 PROCEDURE — 77030002933 HC SUT MCRYL J&J -A: Performed by: SURGERY

## 2018-04-26 PROCEDURE — 74011250636 HC RX REV CODE- 250/636

## 2018-04-26 PROCEDURE — 88307 TISSUE EXAM BY PATHOLOGIST: CPT | Performed by: SURGERY

## 2018-04-26 PROCEDURE — 81025 URINE PREGNANCY TEST: CPT

## 2018-04-26 PROCEDURE — 0DB64Z3 EXCISION OF STOMACH, PERCUTANEOUS ENDOSCOPIC APPROACH, VERTICAL: ICD-10-PCS | Performed by: SURGERY

## 2018-04-26 PROCEDURE — 77030020263 HC SOL INJ SOD CL0.9% LFCR 1000ML: Performed by: SURGERY

## 2018-04-26 PROCEDURE — 77030034208 HC SLV GASTRCTMY 3D CAL SYS DISP BOEH -C: Performed by: SURGERY

## 2018-04-26 PROCEDURE — 77030013079 HC BLNKT BAIR HGGR 3M -A: Performed by: ANESTHESIOLOGY

## 2018-04-26 PROCEDURE — 77030020747 HC TU INSUF ENDOSC TELE -A: Performed by: SURGERY

## 2018-04-26 PROCEDURE — 77030035048 HC TRCR ENDOSC OPTCL COVD -B: Performed by: SURGERY

## 2018-04-26 PROCEDURE — 77030026438 HC STYL ET INTUB CARD -A: Performed by: ANESTHESIOLOGY

## 2018-04-26 PROCEDURE — 77030002916 HC SUT ETHLN J&J -A: Performed by: SURGERY

## 2018-04-26 PROCEDURE — 74011250636 HC RX REV CODE- 250/636: Performed by: ANESTHESIOLOGY

## 2018-04-26 PROCEDURE — 65660000000 HC RM CCU STEPDOWN

## 2018-04-26 PROCEDURE — 74011250637 HC RX REV CODE- 250/637: Performed by: SURGERY

## 2018-04-26 PROCEDURE — 77030011640 HC PAD GRND REM COVD -A: Performed by: SURGERY

## 2018-04-26 PROCEDURE — 74011000250 HC RX REV CODE- 250

## 2018-04-26 PROCEDURE — 77030039266 HC ADH SKN EXOFIN S2SG -A: Performed by: SURGERY

## 2018-04-26 PROCEDURE — 74011250636 HC RX REV CODE- 250/636: Performed by: SURGERY

## 2018-04-26 PROCEDURE — 77030037366 HC STPLR ENDO TRI-STPLR COVD -C: Performed by: SURGERY

## 2018-04-26 PROCEDURE — 76060000034 HC ANESTHESIA 1.5 TO 2 HR: Performed by: SURGERY

## 2018-04-26 PROCEDURE — 76010000153 HC OR TIME 1.5 TO 2 HR: Performed by: SURGERY

## 2018-04-26 PROCEDURE — 77030031139 HC SUT VCRL2 J&J -A: Performed by: SURGERY

## 2018-04-26 PROCEDURE — 77030002895 HC DEV VASC CLOSR COVD -B: Performed by: SURGERY

## 2018-04-26 PROCEDURE — 77030035042 HC TRCR ENDOSC OPTCL BLDLSS COVD -D: Performed by: SURGERY

## 2018-04-26 PROCEDURE — 77030037032 HC INSRT SCIS CLICKLLINE DISP STOR -B: Performed by: SURGERY

## 2018-04-26 PROCEDURE — 77030008684 HC TU ET CUF COVD -B: Performed by: ANESTHESIOLOGY

## 2018-04-26 PROCEDURE — 77030032435: Performed by: SURGERY

## 2018-04-26 PROCEDURE — 77030035051: Performed by: SURGERY

## 2018-04-26 PROCEDURE — 77030034701 HC DSCRTR CRDLS U/S DISP COVD -F: Performed by: SURGERY

## 2018-04-26 PROCEDURE — 77030018836 HC SOL IRR NACL ICUM -A: Performed by: SURGERY

## 2018-04-26 PROCEDURE — 77030037368 HC STPLR ENDO TRI-STPLR COVD -E: Performed by: SURGERY

## 2018-04-26 PROCEDURE — 77030037367 HC STPLR ENDO TRI-STPLR COVD -D: Performed by: SURGERY

## 2018-04-26 PROCEDURE — 74011000250 HC RX REV CODE- 250: Performed by: SURGERY

## 2018-04-26 PROCEDURE — 77030032490 HC SLV COMPR SCD KNE COVD -B: Performed by: SURGERY

## 2018-04-26 PROCEDURE — 76210000016 HC OR PH I REC 1 TO 1.5 HR: Performed by: SURGERY

## 2018-04-26 PROCEDURE — 77030008756 HC TU IRR SUC STRY -B: Performed by: SURGERY

## 2018-04-26 PROCEDURE — 77030022704 HC SUT VLOC COVD -B: Performed by: SURGERY

## 2018-04-26 PROCEDURE — 77030038157 HC DEV PWR CNTR DISP SIGNIA COVD -C: Performed by: SURGERY

## 2018-04-26 RX ORDER — FENTANYL CITRATE 50 UG/ML
25 INJECTION, SOLUTION INTRAMUSCULAR; INTRAVENOUS
Status: COMPLETED | OUTPATIENT
Start: 2018-04-26 | End: 2018-04-26

## 2018-04-26 RX ORDER — MIDAZOLAM HYDROCHLORIDE 1 MG/ML
INJECTION, SOLUTION INTRAMUSCULAR; INTRAVENOUS AS NEEDED
Status: DISCONTINUED | OUTPATIENT
Start: 2018-04-26 | End: 2018-04-26 | Stop reason: HOSPADM

## 2018-04-26 RX ORDER — NEOSTIGMINE METHYLSULFATE 1 MG/ML
INJECTION INTRAVENOUS AS NEEDED
Status: DISCONTINUED | OUTPATIENT
Start: 2018-04-26 | End: 2018-04-26 | Stop reason: HOSPADM

## 2018-04-26 RX ORDER — SODIUM CHLORIDE, SODIUM LACTATE, POTASSIUM CHLORIDE, CALCIUM CHLORIDE 600; 310; 30; 20 MG/100ML; MG/100ML; MG/100ML; MG/100ML
125 INJECTION, SOLUTION INTRAVENOUS CONTINUOUS
Status: DISCONTINUED | OUTPATIENT
Start: 2018-04-26 | End: 2018-04-26 | Stop reason: HOSPADM

## 2018-04-26 RX ORDER — OXYCODONE AND ACETAMINOPHEN 5; 325 MG/1; MG/1
1 TABLET ORAL AS NEEDED
Status: DISCONTINUED | OUTPATIENT
Start: 2018-04-26 | End: 2018-04-26 | Stop reason: HOSPADM

## 2018-04-26 RX ORDER — SODIUM CHLORIDE, SODIUM LACTATE, POTASSIUM CHLORIDE, CALCIUM CHLORIDE 600; 310; 30; 20 MG/100ML; MG/100ML; MG/100ML; MG/100ML
125 INJECTION, SOLUTION INTRAVENOUS CONTINUOUS
Status: DISCONTINUED | OUTPATIENT
Start: 2018-04-26 | End: 2018-04-28 | Stop reason: HOSPADM

## 2018-04-26 RX ORDER — GLYCOPYRROLATE 0.2 MG/ML
INJECTION INTRAMUSCULAR; INTRAVENOUS AS NEEDED
Status: DISCONTINUED | OUTPATIENT
Start: 2018-04-26 | End: 2018-04-26 | Stop reason: HOSPADM

## 2018-04-26 RX ORDER — BUPIVACAINE HYDROCHLORIDE AND EPINEPHRINE 5; 5 MG/ML; UG/ML
INJECTION, SOLUTION EPIDURAL; INTRACAUDAL; PERINEURAL AS NEEDED
Status: DISCONTINUED | OUTPATIENT
Start: 2018-04-26 | End: 2018-04-26 | Stop reason: HOSPADM

## 2018-04-26 RX ORDER — DEXMEDETOMIDINE HYDROCHLORIDE 4 UG/ML
INJECTION, SOLUTION INTRAVENOUS AS NEEDED
Status: DISCONTINUED | OUTPATIENT
Start: 2018-04-26 | End: 2018-04-26 | Stop reason: HOSPADM

## 2018-04-26 RX ORDER — ENOXAPARIN SODIUM 100 MG/ML
40 INJECTION SUBCUTANEOUS EVERY 24 HOURS
Status: COMPLETED | OUTPATIENT
Start: 2018-04-26 | End: 2018-04-26

## 2018-04-26 RX ORDER — BUPIVACAINE HYDROCHLORIDE AND EPINEPHRINE 5; 5 MG/ML; UG/ML
30 INJECTION, SOLUTION EPIDURAL; INTRACAUDAL; PERINEURAL ONCE
Status: DISCONTINUED | OUTPATIENT
Start: 2018-04-26 | End: 2018-04-26 | Stop reason: HOSPADM

## 2018-04-26 RX ORDER — LORAZEPAM 2 MG/ML
1 INJECTION INTRAMUSCULAR
Status: DISCONTINUED | OUTPATIENT
Start: 2018-04-26 | End: 2018-04-28 | Stop reason: HOSPADM

## 2018-04-26 RX ORDER — SODIUM CHLORIDE 0.9 % (FLUSH) 0.9 %
5-10 SYRINGE (ML) INJECTION AS NEEDED
Status: DISCONTINUED | OUTPATIENT
Start: 2018-04-26 | End: 2018-04-28 | Stop reason: HOSPADM

## 2018-04-26 RX ORDER — DIPHENHYDRAMINE HYDROCHLORIDE 50 MG/ML
12.5 INJECTION, SOLUTION INTRAMUSCULAR; INTRAVENOUS
Status: ACTIVE | OUTPATIENT
Start: 2018-04-26 | End: 2018-04-27

## 2018-04-26 RX ORDER — SODIUM CHLORIDE, SODIUM LACTATE, POTASSIUM CHLORIDE, CALCIUM CHLORIDE 600; 310; 30; 20 MG/100ML; MG/100ML; MG/100ML; MG/100ML
INJECTION, SOLUTION INTRAVENOUS
Status: DISCONTINUED | OUTPATIENT
Start: 2018-04-26 | End: 2018-04-26 | Stop reason: HOSPADM

## 2018-04-26 RX ORDER — SCOLOPAMINE TRANSDERMAL SYSTEM 1 MG/1
1 PATCH, EXTENDED RELEASE TRANSDERMAL
Status: DISCONTINUED | OUTPATIENT
Start: 2018-04-26 | End: 2018-04-28 | Stop reason: HOSPADM

## 2018-04-26 RX ORDER — SODIUM CHLORIDE 0.9 % (FLUSH) 0.9 %
5-10 SYRINGE (ML) INJECTION EVERY 8 HOURS
Status: DISCONTINUED | OUTPATIENT
Start: 2018-04-26 | End: 2018-04-26 | Stop reason: HOSPADM

## 2018-04-26 RX ORDER — FENTANYL CITRATE 50 UG/ML
INJECTION, SOLUTION INTRAMUSCULAR; INTRAVENOUS AS NEEDED
Status: DISCONTINUED | OUTPATIENT
Start: 2018-04-26 | End: 2018-04-26 | Stop reason: HOSPADM

## 2018-04-26 RX ORDER — HYDROMORPHONE HYDROCHLORIDE 2 MG/ML
INJECTION, SOLUTION INTRAMUSCULAR; INTRAVENOUS; SUBCUTANEOUS AS NEEDED
Status: DISCONTINUED | OUTPATIENT
Start: 2018-04-26 | End: 2018-04-26 | Stop reason: HOSPADM

## 2018-04-26 RX ORDER — LIDOCAINE HYDROCHLORIDE 20 MG/ML
INJECTION, SOLUTION EPIDURAL; INFILTRATION; INTRACAUDAL; PERINEURAL AS NEEDED
Status: DISCONTINUED | OUTPATIENT
Start: 2018-04-26 | End: 2018-04-26 | Stop reason: HOSPADM

## 2018-04-26 RX ORDER — DIPHENHYDRAMINE HYDROCHLORIDE 50 MG/ML
12.5 INJECTION, SOLUTION INTRAMUSCULAR; INTRAVENOUS AS NEEDED
Status: DISCONTINUED | OUTPATIENT
Start: 2018-04-26 | End: 2018-04-26 | Stop reason: HOSPADM

## 2018-04-26 RX ORDER — PROPOFOL 10 MG/ML
INJECTION, EMULSION INTRAVENOUS AS NEEDED
Status: DISCONTINUED | OUTPATIENT
Start: 2018-04-26 | End: 2018-04-26 | Stop reason: HOSPADM

## 2018-04-26 RX ORDER — PROCHLORPERAZINE EDISYLATE 5 MG/ML
5 INJECTION INTRAMUSCULAR; INTRAVENOUS EVERY 6 HOURS
Status: DISCONTINUED | OUTPATIENT
Start: 2018-04-26 | End: 2018-04-28 | Stop reason: HOSPADM

## 2018-04-26 RX ORDER — MIDAZOLAM HYDROCHLORIDE 1 MG/ML
1 INJECTION, SOLUTION INTRAMUSCULAR; INTRAVENOUS AS NEEDED
Status: DISCONTINUED | OUTPATIENT
Start: 2018-04-26 | End: 2018-04-26 | Stop reason: HOSPADM

## 2018-04-26 RX ORDER — NALOXONE HYDROCHLORIDE 0.4 MG/ML
0.4 INJECTION, SOLUTION INTRAMUSCULAR; INTRAVENOUS; SUBCUTANEOUS AS NEEDED
Status: DISCONTINUED | OUTPATIENT
Start: 2018-04-26 | End: 2018-04-28 | Stop reason: HOSPADM

## 2018-04-26 RX ORDER — ENOXAPARIN SODIUM 100 MG/ML
40 INJECTION SUBCUTANEOUS EVERY 24 HOURS
Status: DISCONTINUED | OUTPATIENT
Start: 2018-04-27 | End: 2018-04-28 | Stop reason: HOSPADM

## 2018-04-26 RX ORDER — DEXAMETHASONE SODIUM PHOSPHATE 4 MG/ML
INJECTION, SOLUTION INTRA-ARTICULAR; INTRALESIONAL; INTRAMUSCULAR; INTRAVENOUS; SOFT TISSUE AS NEEDED
Status: DISCONTINUED | OUTPATIENT
Start: 2018-04-26 | End: 2018-04-26 | Stop reason: HOSPADM

## 2018-04-26 RX ORDER — SODIUM CHLORIDE 9 MG/ML
25 INJECTION, SOLUTION INTRAVENOUS CONTINUOUS
Status: DISCONTINUED | OUTPATIENT
Start: 2018-04-26 | End: 2018-04-26 | Stop reason: HOSPADM

## 2018-04-26 RX ORDER — LORAZEPAM 2 MG/ML
INJECTION INTRAMUSCULAR
Status: COMPLETED
Start: 2018-04-26 | End: 2018-04-26

## 2018-04-26 RX ORDER — HYDROMORPHONE HYDROCHLORIDE 2 MG/ML
1 INJECTION, SOLUTION INTRAMUSCULAR; INTRAVENOUS; SUBCUTANEOUS
Status: DISCONTINUED | OUTPATIENT
Start: 2018-04-26 | End: 2018-04-28 | Stop reason: HOSPADM

## 2018-04-26 RX ORDER — ONDANSETRON 2 MG/ML
INJECTION INTRAMUSCULAR; INTRAVENOUS AS NEEDED
Status: DISCONTINUED | OUTPATIENT
Start: 2018-04-26 | End: 2018-04-26 | Stop reason: HOSPADM

## 2018-04-26 RX ORDER — SODIUM CHLORIDE 0.9 % (FLUSH) 0.9 %
5-10 SYRINGE (ML) INJECTION AS NEEDED
Status: DISCONTINUED | OUTPATIENT
Start: 2018-04-26 | End: 2018-04-26 | Stop reason: HOSPADM

## 2018-04-26 RX ORDER — MIDAZOLAM HYDROCHLORIDE 1 MG/ML
0.5 INJECTION, SOLUTION INTRAMUSCULAR; INTRAVENOUS
Status: DISCONTINUED | OUTPATIENT
Start: 2018-04-26 | End: 2018-04-26 | Stop reason: HOSPADM

## 2018-04-26 RX ORDER — SODIUM CHLORIDE 0.9 % (FLUSH) 0.9 %
5-10 SYRINGE (ML) INJECTION EVERY 8 HOURS
Status: DISCONTINUED | OUTPATIENT
Start: 2018-04-26 | End: 2018-04-28 | Stop reason: HOSPADM

## 2018-04-26 RX ORDER — ONDANSETRON 2 MG/ML
4 INJECTION INTRAMUSCULAR; INTRAVENOUS EVERY 6 HOURS
Status: DISCONTINUED | OUTPATIENT
Start: 2018-04-26 | End: 2018-04-28 | Stop reason: HOSPADM

## 2018-04-26 RX ORDER — ROCURONIUM BROMIDE 10 MG/ML
INJECTION, SOLUTION INTRAVENOUS AS NEEDED
Status: DISCONTINUED | OUTPATIENT
Start: 2018-04-26 | End: 2018-04-26 | Stop reason: HOSPADM

## 2018-04-26 RX ORDER — SUCCINYLCHOLINE CHLORIDE 20 MG/ML
INJECTION INTRAMUSCULAR; INTRAVENOUS AS NEEDED
Status: DISCONTINUED | OUTPATIENT
Start: 2018-04-26 | End: 2018-04-26 | Stop reason: HOSPADM

## 2018-04-26 RX ORDER — ACETAMINOPHEN 10 MG/ML
INJECTION, SOLUTION INTRAVENOUS AS NEEDED
Status: DISCONTINUED | OUTPATIENT
Start: 2018-04-26 | End: 2018-04-26 | Stop reason: HOSPADM

## 2018-04-26 RX ORDER — PROCHLORPERAZINE EDISYLATE 5 MG/ML
INJECTION INTRAMUSCULAR; INTRAVENOUS
Status: DISPENSED
Start: 2018-04-26 | End: 2018-04-26

## 2018-04-26 RX ORDER — LIDOCAINE HYDROCHLORIDE 10 MG/ML
0.1 INJECTION, SOLUTION EPIDURAL; INFILTRATION; INTRACAUDAL; PERINEURAL AS NEEDED
Status: DISCONTINUED | OUTPATIENT
Start: 2018-04-26 | End: 2018-04-26 | Stop reason: HOSPADM

## 2018-04-26 RX ORDER — FENTANYL CITRATE 50 UG/ML
50 INJECTION, SOLUTION INTRAMUSCULAR; INTRAVENOUS AS NEEDED
Status: DISCONTINUED | OUTPATIENT
Start: 2018-04-26 | End: 2018-04-26 | Stop reason: HOSPADM

## 2018-04-26 RX ORDER — ONDANSETRON 2 MG/ML
4 INJECTION INTRAMUSCULAR; INTRAVENOUS AS NEEDED
Status: DISCONTINUED | OUTPATIENT
Start: 2018-04-26 | End: 2018-04-26 | Stop reason: HOSPADM

## 2018-04-26 RX ORDER — KETOROLAC TROMETHAMINE 30 MG/ML
15 INJECTION, SOLUTION INTRAMUSCULAR; INTRAVENOUS EVERY 6 HOURS
Status: COMPLETED | OUTPATIENT
Start: 2018-04-26 | End: 2018-04-27

## 2018-04-26 RX ADMIN — FENTANYL CITRATE 25 MCG: 50 INJECTION, SOLUTION INTRAMUSCULAR; INTRAVENOUS at 14:17

## 2018-04-26 RX ADMIN — KETOROLAC TROMETHAMINE 15 MG: 30 INJECTION, SOLUTION INTRAMUSCULAR at 13:00

## 2018-04-26 RX ADMIN — FENTANYL CITRATE 50 MCG: 50 INJECTION, SOLUTION INTRAMUSCULAR; INTRAVENOUS at 08:03

## 2018-04-26 RX ADMIN — SODIUM CHLORIDE, SODIUM LACTATE, POTASSIUM CHLORIDE, AND CALCIUM CHLORIDE 125 ML/HR: 600; 310; 30; 20 INJECTION, SOLUTION INTRAVENOUS at 14:41

## 2018-04-26 RX ADMIN — LIDOCAINE HYDROCHLORIDE 100 MG: 20 INJECTION, SOLUTION EPIDURAL; INFILTRATION; INTRACAUDAL; PERINEURAL at 07:32

## 2018-04-26 RX ADMIN — CEFAZOLIN 3 G: 1 INJECTION, POWDER, FOR SOLUTION INTRAMUSCULAR; INTRAVENOUS; PARENTERAL at 07:27

## 2018-04-26 RX ADMIN — HYDROMORPHONE HYDROCHLORIDE 1 MG: 2 INJECTION INTRAMUSCULAR; INTRAVENOUS; SUBCUTANEOUS at 17:04

## 2018-04-26 RX ADMIN — GLYCOPYRROLATE 0.5 MG: 0.2 INJECTION INTRAMUSCULAR; INTRAVENOUS at 08:59

## 2018-04-26 RX ADMIN — ONDANSETRON 4 MG: 2 INJECTION INTRAMUSCULAR; INTRAVENOUS at 17:08

## 2018-04-26 RX ADMIN — PROCHLORPERAZINE EDISYLATE 5 MG: 5 INJECTION INTRAMUSCULAR; INTRAVENOUS at 11:10

## 2018-04-26 RX ADMIN — DEXAMETHASONE SODIUM PHOSPHATE 8 MG: 4 INJECTION, SOLUTION INTRA-ARTICULAR; INTRALESIONAL; INTRAMUSCULAR; INTRAVENOUS; SOFT TISSUE at 07:32

## 2018-04-26 RX ADMIN — HYDROMORPHONE HYDROCHLORIDE 1 MG: 2 INJECTION INTRAMUSCULAR; INTRAVENOUS; SUBCUTANEOUS at 21:03

## 2018-04-26 RX ADMIN — SODIUM CHLORIDE, SODIUM LACTATE, POTASSIUM CHLORIDE, CALCIUM CHLORIDE: 600; 310; 30; 20 INJECTION, SOLUTION INTRAVENOUS at 08:59

## 2018-04-26 RX ADMIN — DEXMEDETOMIDINE HYDROCHLORIDE 4 MCG: 4 INJECTION, SOLUTION INTRAVENOUS at 07:32

## 2018-04-26 RX ADMIN — FENTANYL CITRATE 100 MCG: 50 INJECTION, SOLUTION INTRAMUSCULAR; INTRAVENOUS at 07:32

## 2018-04-26 RX ADMIN — ROCURONIUM BROMIDE 5 MG: 10 INJECTION, SOLUTION INTRAVENOUS at 07:32

## 2018-04-26 RX ADMIN — FENTANYL CITRATE 25 MCG: 50 INJECTION, SOLUTION INTRAMUSCULAR; INTRAVENOUS at 13:15

## 2018-04-26 RX ADMIN — SODIUM CHLORIDE, SODIUM LACTATE, POTASSIUM CHLORIDE, CALCIUM CHLORIDE: 600; 310; 30; 20 INJECTION, SOLUTION INTRAVENOUS at 07:24

## 2018-04-26 RX ADMIN — ROCURONIUM BROMIDE 20 MG: 10 INJECTION, SOLUTION INTRAVENOUS at 08:15

## 2018-04-26 RX ADMIN — SODIUM CHLORIDE, POTASSIUM CHLORIDE, SODIUM LACTATE AND CALCIUM CHLORIDE 125 ML/HR: 600; 310; 30; 20 INJECTION, SOLUTION INTRAVENOUS at 07:00

## 2018-04-26 RX ADMIN — SUCCINYLCHOLINE CHLORIDE 160 MG: 20 INJECTION INTRAMUSCULAR; INTRAVENOUS at 07:32

## 2018-04-26 RX ADMIN — NEOSTIGMINE METHYLSULFATE 3.5 MG: 1 INJECTION INTRAVENOUS at 08:59

## 2018-04-26 RX ADMIN — ENOXAPARIN SODIUM 40 MG: 40 INJECTION SUBCUTANEOUS at 06:42

## 2018-04-26 RX ADMIN — LORAZEPAM 1 MG: 2 INJECTION INTRAMUSCULAR; INTRAVENOUS at 10:56

## 2018-04-26 RX ADMIN — HYDROMORPHONE HYDROCHLORIDE 0.25 MG: 2 INJECTION, SOLUTION INTRAMUSCULAR; INTRAVENOUS; SUBCUTANEOUS at 09:15

## 2018-04-26 RX ADMIN — PROPOFOL 200 MG: 10 INJECTION, EMULSION INTRAVENOUS at 07:32

## 2018-04-26 RX ADMIN — FENTANYL CITRATE 25 MCG: 50 INJECTION, SOLUTION INTRAMUSCULAR; INTRAVENOUS at 14:22

## 2018-04-26 RX ADMIN — ROCURONIUM BROMIDE 45 MG: 10 INJECTION, SOLUTION INTRAVENOUS at 07:45

## 2018-04-26 RX ADMIN — KETOROLAC TROMETHAMINE 15 MG: 30 INJECTION, SOLUTION INTRAMUSCULAR at 23:07

## 2018-04-26 RX ADMIN — DEXMEDETOMIDINE HYDROCHLORIDE 4 MCG: 4 INJECTION, SOLUTION INTRAVENOUS at 07:24

## 2018-04-26 RX ADMIN — MIDAZOLAM HYDROCHLORIDE 2 MG: 1 INJECTION, SOLUTION INTRAMUSCULAR; INTRAVENOUS at 07:24

## 2018-04-26 RX ADMIN — FENTANYL CITRATE 25 MCG: 50 INJECTION, SOLUTION INTRAMUSCULAR; INTRAVENOUS at 13:10

## 2018-04-26 RX ADMIN — PROCHLORPERAZINE EDISYLATE 5 MG: 5 INJECTION INTRAMUSCULAR; INTRAVENOUS at 23:08

## 2018-04-26 RX ADMIN — DEXMEDETOMIDINE HYDROCHLORIDE 4 MCG: 4 INJECTION, SOLUTION INTRAVENOUS at 08:15

## 2018-04-26 RX ADMIN — KETOROLAC TROMETHAMINE 15 MG: 30 INJECTION, SOLUTION INTRAMUSCULAR at 17:08

## 2018-04-26 RX ADMIN — HYDROMORPHONE HYDROCHLORIDE 1 MG: 2 INJECTION, SOLUTION INTRAMUSCULAR; INTRAVENOUS; SUBCUTANEOUS at 09:07

## 2018-04-26 RX ADMIN — ACETAMINOPHEN 1000 MG: 10 INJECTION, SOLUTION INTRAVENOUS at 08:36

## 2018-04-26 RX ADMIN — ONDANSETRON 4 MG: 2 INJECTION INTRAMUSCULAR; INTRAVENOUS at 08:54

## 2018-04-26 NOTE — PROGRESS NOTES
Clinical Care Lead Arrival Summary        Name: Jessica Remy  MRN: [id number]  Date: 2018 8:45 AM  Admission Date: 2018  : 1990  Situation:      Background:        The Beta blocker the patient is taking is [unfilled],          STAND:   Voice quality/secretions:    Hx of dysphagia:    O2 sat:    Alertness:      Flu vaccination received for current season:       Pneumonia vaccination received in the past:  Anticoagulant Therapy: No    VTE Documentation  VTE Risk Assessment    Mechanical VTE orders:    Venous Foot Pump:    Graduated Compression Stockings:    Sequential Compression Devices:    Patient Refused VTE:        Diet:                Assessment:    Lines/Drains/Airways:   Peripheral IV 18 Right Hand (Active)       Airway - Endotracheal Tube 18 Oral (Active)       Last 3 Weights:  Last 3 Recorded Weights in this Encounter    18 0616   Weight: 141.5 kg (312 lb)     @OHFF(66)      Recommendations:  Consult Orders: )None  Recent orders:  VERIFY CONSENT HAS BEEN OBTAINED  POC URINE PREGNANCY TEST  POC GLUCOSE  SALINE LOCK IV  NOTIFY ANESTHESIA  POC GLUCOSE  SALINE LOCK IV    Core Measures Compliant   CHF:{YES/NO/NA:75130}   Pneumonia:{YES/NO/NA:47136}   Vaccines:{YES/NO/NA:22467}   SCIP:{YES/NO/NA:97938}   Miller:{YES/NO/NA:64739}   AMI:{YES/NO/NA:81100}

## 2018-04-26 NOTE — PERIOP NOTES
0815- CALLED TO UPDATE THE PATIENT'S FAMILY ON THE START AND PROGRESS OF THE PROCEDURE, FAMILY IS NOT PRESENT IN THE WAITING AREA AT THIS TIME

## 2018-04-26 NOTE — BRIEF OP NOTE
BRIEF OPERATIVE NOTE    Date of Procedure: 4/26/2018   Preoperative Diagnosis: MORBID OBESITY   Postoperative Diagnosis: MORBID OBESITY    Procedure(s):  LAPAROSCOPIC SLEEVE GASTRECTOMY WITH ESOPHAGOGASTRODUODENOSCOPY  Surgeon(s) and Role:     * Flora Sharp MD - Primary         Surgical Assistant: CONTRERAS Hope    Surgical Staff:  Circ-1: Dionte Spencer  Physician Assistant: CONTRERAS Hope  Scrub Tech-1: Marina Logan  Event Time In   Incision Start 0801   Incision Close      Anesthesia: General   Estimated Blood Loss: none  Specimens:   ID Type Source Tests Collected by Time Destination   1 : PARTIAL GASTRECTOMY Fresh Stomach  Flora Sharp MD 4/26/2018 8654 Pathology      Findings: normal sleeve gastrectomy, normal post sleeve EGD, negative leak test   Complications: none  Implants: * No implants in log *

## 2018-04-26 NOTE — ANESTHESIA POSTPROCEDURE EVALUATION
Post-Anesthesia Evaluation and Assessment    Patient: Laisha Rose MRN: [id number]  SSN: [id number]    YOB: 1990  Age: 32 y.o. Sex: female       Cardiovascular Function/Vital Signs  Visit Vitals    BP (!) 146/92    Pulse 72    Temp 36.8 °C (98.2 °F)    Resp 28    Ht 5' 5\" (1.651 m)    Wt 141.5 kg (312 lb)    SpO2 94%    BMI 51.92 kg/m2       Patient is status post general anesthesia for Procedure(s):  LAPAROSCOPIC GASTRECTOMY SLEEVE, ENDOSCOPY . Nausea/Vomiting: None    Postoperative hydration reviewed and adequate. Pain:  Pain Scale 1: Numeric (0 - 10) (04/26/18 0616)  Pain Intensity 1: 0 (04/26/18 0616)   Managed    Neurological Status:   Neuro (WDL): Within Defined Limits (04/26/18 7176)   At baseline    Mental Status and Level of Consciousness: Arousable    Pulmonary Status:   O2 Device: CO2 nasal cannula (04/26/18 0921)   Adequate oxygenation and airway patent    Complications related to anesthesia: None    Post-anesthesia assessment completed.  No concerns    Signed By: Marykay Siemens, DO     April 26, 2018

## 2018-04-26 NOTE — PERIOP NOTES
Endoscope was pre-cleaned at bedside immediately following procedure by Encarnacion Osler, RN   Button lot # [id number] (if applicable)

## 2018-04-26 NOTE — OP NOTES
[facility] REPORT    Celine Silver  MR#: [id number]  : 1990  ACCOUNT #: [de-identified]   DATE OF SERVICE: 2018    PREOPERATIVE DIAGNOSIS:  Morbid obesity. POSTOPERATIVE DIAGNOSIS:  Morbid obesity. PROCEDURE PERFORMED:  Laparoscopic sleeve gastrectomy with esophagogastroduodenoscopy. PERFORMED BY SURGEON:  Luan Espinoza MD    ASSISTANT:  Physician assistant, Mercedez Shrestha PA-C.    INDICATION FOR THE OPERATION:  The patient is a 26-year-old female with a history of morbid obesity with a BMI of 52 with bariatric comorbidities including gastroesophageal reflux disease who has been preoperatively evaluated for surgery. She has been through the necessary preoperative education and is now ready for vertical sleeve gastrectomy. My PA, Mercedez Shrestha was necessary for the operation due to the complex nature of the bariatric operation and her high BMI. She was necessary for intraoperative decision making, operation of the camera and retraction during the surgery. DESCRIPTION OF OPERATION:  The patient was met in the preop holding area, the H and P was updated. Consent was signed. All risks and benefits were explained to the patient prior to the start of the operation. She was taken back to the operating room. She was lying in a supine position. The abdomen was prepped and draped in standard sterile fashion. Timeout was called. Antibiotics were given. SCDs were in the lower extremities. Started the operation by making a 5 mm incision to the left upper quadrant, inserting a Visiport trocar into the intra-abdominal cavity and insufflating to 15 mmHg. We then placed a 5 mm trocar superior and to the left of the umbilicus, a 15 mm port superior and to the right of the umbilicus, a 5 mm right upper quadrant port and a 5 mm subxiphoid port. We were lifting the liver up and out of the way, we could see the hiatus very clearly.   There was no evidence of any hiatal hernia. We had the patient placed into steep reverse Trendelenburg position and then found an area about 6 cm proximal to the pylorus and marked that area on the greater curve omentum as the starting point for our sleeve gastrectomy on the distal portion of the stomach. We then started taking down the gastrocolic ligament from the greater curve of the stomach starting at the mid body and traveling all the way superiorly, all the way up to the angle of His and left clarisa exposing the left clarisa completely, removing some of the fat pad over top. We took all of the posterior attachments down and then mobilized the stomach, then inferiorly all the way down to our karolina about 6 cm proximal to the pylorus, the greater curve of the stomach was mobilized completely. We then took down the greater curve, the stomach was completely mobilized. We then had the ViSiGi tube placed down the mouth into the esophagus along the lesser curve and to the pyloric area, it was along the lesser curve in perfect position. We had it hooked to suction. We then started creating the vertical sleeve gastrectomy with a [employer] [place] stapler with a black load with reinforcement for the first firing of the stapler. We then used 4 more firings of the purple loads with reinforcement for the vertical portion of the sleeve gastrectomy all the way up to the angle of His creating a featureless vertical sleeve gastrectomy. The sleeve gastrectomy appeared to be created perfectly. We then disconnected the partial gastrectomy specimen stomach into the right upper quadrant out of our way and then had the ViSiGi tube removed off of suction from the stomach and then oversewed the sleeve staple line with a 2-0 V-Loc suture in a running fashion all the way down to the mid body of the stomach, buttressing with omentum.   We then performed our endoscopy and leak test.  We placed the endoscope down the mouth into the esophagus and down into the stomach. We were able to get the scope all the way down to the pylorus and into the duodenum. There were no abnormalities. The scope passed easily down into that area. There was no twisting, narrowing or bleeding on the stomach. We then desufflated the stomach, removed the endoscope and then removed our partial gastrectomy specimen from the 15 mm port site after dilating the port site and then passed it off the field as partial gastrectomy. We then removed our liver retractor and then closed our 15 mm port fascial defect with an Endo Close suture passing device in interrupted figure-of-eight fashion and desufflated the abdominal cavity, removed the trocars and closed the skin with 4-0 Monocryl and Dermabond. Dr. Paulette Martinez was present and scrubbed during the entire operation. COUNTS:  Correct. ANESTHESIA:  General.    ESTIMATED BLOOD LOSS:  None. SPECIMENS REMOVED:  Partial gastrectomy. FINDINGS:  Normal sleeve gastrectomy, normal postop EGD, negative leak test.    COMPLICATIONS:  None. IMPLANTS:  None.       MD DESMOND Kemp /   D: 04/26/2018 09:15     T: 04/26/2018 11:54  JOB #: [id number]

## 2018-04-26 NOTE — PERIOP NOTES
TRANSFER - OUT REPORT:    Verbal report given to Nargis RN(name) on [facility]  being transferred to [facility](unit) for routine progression of care       Report consisted of patients Situation, Background, Assessment and   Recommendations(SBAR). Information from the following report(s) SBAR, OR Summary, Intake/Output and MAR was reviewed with the receiving nurse. Lines:   Peripheral IV 04/26/18 Right Hand (Active)   Site Assessment Clean, dry, & intact 4/26/2018 11:00 AM   Phlebitis Assessment 0 4/26/2018 11:00 AM   Infiltration Assessment 0 4/26/2018 11:00 AM   Dressing Status Clean, dry, & intact 4/26/2018 11:00 AM   Dressing Type Transparent 4/26/2018 11:00 AM   Hub Color/Line Status Pink; Infusing 4/26/2018 11:00 AM   Action Taken Open ports on tubing capped 4/26/2018 11:00 AM   Alcohol Cap Used Yes 4/26/2018 11:00 AM        Opportunity for questions and clarification was provided.       Patient transported with:   Scint-X

## 2018-04-26 NOTE — IP AVS SNAPSHOT
2700 [facility] [address] [place] [address] 
[phone] Patient: Latasha Moon MRN: [id number] XAP:1/79/4496 About your hospitalization You were admitted on:  April 26, 2018 You last received care in the:  [facility] 4 SURG/BARIATRICS You were discharged on:  April 28, 2018 Why you were hospitalized Your primary diagnosis was:  Not on File Your diagnoses also included: Morbid Obesity With Bmi Of 50.0-59.9, Adult (Hcc) Follow-up Information Follow up With Details Comments Contact Info Maya Granados MD Go in 2 weeks For wound re-check 217 [facility] [address] [place] [address] 
[phone] [place]   [address] [address] 
[phone] Your Scheduled Appointments Thursday May 10, 2018 10:00 AM EDT  
POST OP 10 MIN with Barby Hairston NP  
[facility] 22 239 ([facility]) 217 [address] 7 [phone]  
[phone] Thursday May 24, 2018 10:20 AM EDT  
POST OP 10 MIN with Janett Ames NP  
[facility] 22 910 ([facility]) [address] [place] [address] 7 [phone]  
[phone] Thursday June 07, 2018 10:00 AM EDT  
POST OP 10 MIN with Janett Ames NP  
[facility] 22 356 ([facility]) [address] [place] [address] 7 [phone]  
[phone] Discharge Orders None A check karolina indicates which time of day the medication should be taken. My Medications START taking these medications Instructions Each Dose to Equal  
 Morning Noon Evening Bedtime HYDROmorphone 2 mg tablet Commonly known as:  DILAUDID Your last dose was: Your next dose is: Take 1-2 Tabs by mouth every four (4) hours as needed for Pain.  Max Daily Amount: 24 mg.  
 2-4 mg  
    
   
   
   
  
  
 CONTINUE taking these medications Instructions Each Dose to Equal  
 Morning Noon Evening Bedtime  
 hyoscyamine SL 0.125 mg SL tablet Commonly known as:  LEVSIN/SL Your last dose was: Your next dose is:    
   
   
 1 Tab by SubLINGual route every four (4) hours as needed for Cramping.  
 0.125 mg  
    
   
   
   
  
 loratadine-pseudoephedrine  mg per tablet Commonly known as:  CLARITIN-D 24 HOUR Your last dose was: Your next dose is: Take 1 Tab by mouth daily. 1 Tab MIRENA 20 mcg/24 hr (5 years) Iud  
Generic drug:  levonorgestrel Your last dose was: Your next dose is:    
   
   
 1 Each by IntraUTERine route once. Indications: PREGNANCY CONTRACEPTION  
 1 Each  
    
   
   
   
  
 omeprazole 20 mg capsule Commonly known as:  PRILOSEC Your last dose was: Your next dose is: Take 1 Cap by mouth daily. Indications: PREVENTION OF STRESS ULCER  
 20 mg  
    
   
   
   
  
 ondansetron 4 mg disintegrating tablet Commonly known as:  ZOFRAN ODT Your last dose was: Your next dose is: Take 1 Tab by mouth every six (6) hours as needed for Nausea. 4 mg ASK your doctor about these medications Instructions Each Dose to Equal  
 Morning Noon Evening Bedtime  
 enoxaparin 40 mg/0.4 mL Commonly known as:  LOVENOX Ask about: Should I take this medication? Your last dose was: Your next dose is: 0.4 mL by SubCUTAneous route daily for 14 days. 40 mg Where to Get Your Medications Information on where to get these meds will be given to you by the nurse or doctor. ! Ask your nurse or doctor about these medications HYDROmorphone 2 mg tablet Opioid Education Prescription Opioids: What You Need to Know: Prescription opioids can be used to help relieve moderate-to-severe pain and are often prescribed following a surgery or injury, or for certain health conditions. These medications can be an important part of treatment but also come with serious risks. Opioids are strong pain medicines. Examples include hydrocodone, oxycodone, fentanyl, and morphine. Heroin is an example of an illegal opioid. It is important to work with your health care provider to make sure you are getting the safest, most effective care. WHAT ARE THE RISKS AND SIDE EFFECTS OF OPIOID USE? Prescription opioids carry serious risks of addiction and overdose, especially with prolonged use. An opioid overdose, often marked by slow breathing, can cause sudden death. The use of prescription opioids can have a number of side effects as well, even when taken as directed. · Tolerance-meaning you might need to take more of a medication for the same pain relief · Physical dependence-meaning you have symptoms of withdrawal when the medication is stopped. Withdrawal symptoms can include nausea, sweating, chills, diarrhea, stomach cramps, and muscle aches. Withdrawal can last up to several weeks, depending on which drug you took and how long you took it. · Increased sensitivity to pain · Constipation · Nausea, vomiting, and dry mouth · Sleepiness and dizziness · Confusion · Depression · Low levels of testosterone that can result in lower sex drive, energy, and strength · Itching and sweating RISKS ARE GREATER WITH:      
· History of drug misuse, substance use disorder, or overdose · Mental health conditions (such as depression or anxiety) · Sleep apnea · Older age (72 years or older) · Pregnancy Avoid alcohol while taking prescription opioids. Also, unless specifically advised by your health care provider, medications to avoid include: · Benzodiazepines (such as Xanax or Valium) · Muscle relaxants (such as Soma or Flexeril) · Hypnotics (such as Ambien or Lunesta) · Other prescription opioids KNOW YOUR OPTIONS Talk to your health care provider about ways to manage your pain that don't involve prescription opioids. Some of these options may actually work better and have fewer risks and side effects. Options may include: 
· Pain relievers such as acetaminophen, ibuprofen, and naproxen · Some medications that are also used for depression or seizures · Physical therapy and exercise · Counseling to help patients learn how to cope better with triggers of pain and stress. · Application of heat or cold compress · Massage therapy · Relaxation techniques Be Informed Make sure you know the name of your medication, how much and how often to take it, and its potential risks & side effects. IF YOU ARE PRESCRIBED OPIOIDS FOR PAIN: 
· Never take opioids in greater amounts or more often than prescribed. Remember the goal is not to be pain-free but to manage your pain at a tolerable level. · Follow up with your primary care provider to: · Work together to create a plan on how to manage your pain. · Talk about ways to help manage your pain that don't involve prescription opioids. · Talk about any and all concerns and side effects. · Help prevent misuse and abuse. · Never sell or share prescription opioids · Help prevent misuse and abuse. · Store prescription opioids in a secure place and out of reach of others (this may include visitors, children, friends, and family). · Safely dispose of unused/unwanted prescription opioids: Find your community drug take-back program or your pharmacy mail-back program, or flush them down the toilet, following guidance from the Food and Drug Administration (www.fda.gov/Drugs/ResourcesForYou). · Visit www.cdc.gov/drugoverdose to learn about the risks of opioid abuse and overdose.  
· If you believe you may be struggling with addiction, tell your health care provider and ask for guidance or call [address] at [phone]. Discharge Instructions DISCHARGE SUMMARY from Nurse PATIENT INSTRUCTIONS: 
 
These are general instructions for a healthy lifestyle: No smoking/ No tobacco products/ Avoid exposure to second hand smoke Surgeon General's Warning:  Quitting smoking now greatly reduces serious risk to your health. Obesity, smoking, and sedentary lifestyle greatly increases your risk for illness A healthy diet, regular physical exercise & weight monitoring are important for maintaining a healthy lifestyle You may be retaining fluid if you have a history of heart failure or if you experience any of the following symptoms:  Weight gain of 3 pounds or more overnight or 5 pounds in a week, increased swelling in our hands or feet or shortness of breath while lying flat in bed. Please call your doctor as soon as you notice any of these symptoms; do not wait until your next office visit. Recognize signs and symptoms of STROKE: 
 
F-face looks uneven A-arms unable to move or move unevenly S-speech slurred or non-existent T-time-call 911 as soon as signs and symptoms begin-DO NOT go Back to bed or wait to see if you get better-TIME IS BRAIN. Warning Signs of HEART ATTACK Call 911 if you have these symptoms: 
? Chest discomfort. Most heart attacks involve discomfort in the center of the chest that lasts more than a few minutes, or that goes away and comes back. It can feel like uncomfortable pressure, squeezing, fullness, or pain. ? Discomfort in other areas of the upper body. Symptoms can include pain or discomfort in one or both arms, the back, neck, jaw, or stomach. ? Shortness of breath with or without chest discomfort. ? Other signs may include breaking out in a cold sweat, nausea, or lightheadedness. Don't wait more than five minutes to call [address]! Fast action can save your life. Calling 911 is almost always the fastest way to get lifesaving treatment. Emergency Medical Services staff can begin treatment when they arrive  up to an hour sooner than if someone gets to the hospital by car. The discharge information has been reviewed with the patient. The patient verbalized understanding. Discharge medications reviewed with the patient and appropriate educational materials and side effects teaching were provided. ___________________________________________________________________________________________________________________________________Laparoscopic Sleeve Gastrectomy Patient Discharge Instructions Lui Felix / [id number] : 1990 Admitted 2018 Discharged: · It is important that you take the medication exactly as they are prescribed. · Keep your medication in the bottles provided by the pharmacist and keep a list of the medication names, dosages, and times to be taken in your wallet. · Do not take other medications without consulting your doctor. · Wound care: You may shower starting tomorrow. Do not remove the dermabond on the incision, it will fall of on its own in a few weeks. · No heavy lifting or strenuous activity for 2wks after the operation Please add a B12 500mcg supplement daily. Because part of your stomach has been removed, you are more at risk for a B12 deficiency. Diet:  Full liquid Bariatric diet as outlined in your education book and on the handout provided by the dietician. Gastric Sleeve Patient Discharge Instructions [facility]  
[phone] 1. Activities: You may be active walking, stair climbing, and doing light weight activities with one to two-pound weights, sitting in a chair using different arm motions.  Major restrictions include driving until your first office visit and lifting anything heavier than ten pounds. It is very important that you walk frequently. In addition to walking, you should continue to use your incentive spirometer (breathing exercises) throughout the day. 2. Caring for vour incision (s}: Your surgical incision(s) will be covered with Derma bond (super glue). You may shower as desired and simply pat dry the area over the incision(s). There may occasionally be seepage of yellowish to yellowish-maroon dissolved fat from your wound, which is of no major concern as long as the wound is not red, hardened in the area of the drainage, or if the drainage has a foul smell. If there are any questions, call our office for further instructions. If there is this type of dissolved fat drainage,  the shower and gently express the fluid from your wound. Then keep gauze pads over the area to protect both the wound and your clothes. 3. When to call the office immediately: 
 
 
*Chest pain (not associated with eating/drinking) *Shortness of breath (more than normal) *Sudden pain and/or redness in calf *Fever greater than 101F 
*Persistent nausea and/or vomiting (unable to keep down any liquids) *Bleeding from incision(s) *Severe abdominal pain *Any other concerning symptoms 4. Diet: There are three main priorities as far as your diet is concerned--- 
 
a. Clear Liquids-drink from 1 oz. cups or standard shot glass. These liquids are non-carbonated, no added sugar, and not irritating to your stomach. They may include water, tea, coffee\" 100% no added sugar juices (avoid citrus) , clear broth, blenderized clear soups such as [employer]' Healthy Request Chicken Noodle and Chicken & Rice, Sugar-free products including popsicles, Eliseo-Aid, and Crystal Lite. b. Vitamins: Multi-vitamin with iron, Vitamin B-12 and D capsule may be taken as recommended.   
 
c. Protein Intake: During your initial two-three weeks when your body is switching from burning glucose to directly burning fat, there is an attempt by your body to use protein as a fuel. To protect that protein, we like you to exercise as listed above, and to try to take in 50-60 grams of protein per day. This can be done with four 8 oz. serving of skim milk and Low Carb [place] Instant Breakfast. Each 8 oz. should be considered a meal-breakfast, lunch, or supper, and during this mealtime it should be the sole ingested substance. Stop your clear liquids for 20 minutes before your start on the instant breakfast, which is sipped 1 oz. at a time. You may also try the following substitute for [place] Instant Breakfast: skim milk-fat free powdered milk + Egg Beaters + flavor extract. If desired, ice may be added and blenderized in the . If the milk upsets your stomach, you may purchase Lactaid tablets from any drug store. Lactaid skim milk may be purchased at most major supermarkets. Another alternative is Boost Diabetic, which is Lactose-free and ready to drink. There are many protein supplements on the market. Whey and Soy based protein powders/drinks are acceptable. If you have any questions about specific products please call the office. d. Other foods may be taken if you have met the requirements of a, b, and c. These foods should be low fat, low sugar, and low spice, and blenderized to the point that, if needed, could be sucked through a straw. One of the favorite treats is dietetic canned fruit blenderized with a combination of its juices and crushed ice, and then eaten in small amounts with a spoon. A smooth low-calorie, low-fat yogurt (should be 100 calories or less) is acceptable as is low-fat or fat-free cottage cheese. If you are having difficulty with your diet, please call the office. 5. Medications: Take no more than 2 pills at a time. Wait 20 minutes between pills. a.  Vitamins as listed above---multi-vitamin with iron twice a day, B-12 once a day, vitamin D. 
 
b. Acid reducing medicineWill be prescribed by your surgeon at discharge. c. Mylanta Plusone to two teaspoons as needed for belching or gas (other gas relieving medicines are also acceptable Beano, GasX, etc). d. Bowel Regulationmild laxatives are permissible such as Milk of Magnesia, Dulcolax (either by mouth or suppository). If you are accustomed to using a [id number] laxative not mentioned, you may continue to use it. Fibercon tablets or Benefiber may be taken as a fiber supplement to regulate bowel movements. Fibercon tablets should be broken in half and taken in half and taken one half in the morning and one half in the evening. Benefiber (1 tsp.) can be added to your protein drink(s). It has no taste or added thickness. Imodium AD may be taken as needed for diarrhea. 
 
e. Pain medication-one to two every four hours as needed for pain control. If pain is mild, try extra-strength Tylenol first. 
 
f. Do not take any pain or arthritis medication such as Aspirin, Advil, Aleve, Naprosyn, or any other nonsteroidal anti-inflammatory medication unless approved by your surgeon. A Tylenol product is OK. 
 
g. Preadmission medications may be resumed, but this should be discussed with your doctor before your discharge from the hospital. 
 
6.  Follow up Office Visits:  call our office at [phone] if you have any questions or concerns. Your appointment should be 2 weeks from your surgery date. Do not plan on returning to work prior to this office visit unless discussed with your doctor. Future Appointments Date Time Provider Papo Tejeda 5/10/2018 10:00 AM Barby Hairston NP [facility] TERRIE SCHED  
5/24/2018 10:20 AM THAI Dorsey  
6/7/2018 10:00 AM THAI Dorsey Information obtained by : 
I understand that if any problems occur once I am at home I am to contact my physician. I understand and acknowledge receipt of the instructions indicated above. Physician's or R.N.'s Signature                                                                  Date/Time Patient or Representative Signature                                                          Date/Time MD David Salazar, [facility] Dilia Matute, [address] [facility], [address] [facility] Introducing [facility]! Amira Jolley introduces [employer] patient portal. Now you can access parts of your medical record, email your doctor's office, and request medication refills online. 1. In your internet browser, go to [email or web address] 2. Click on the First Time User? Click Here link in the Sign In box. You will see the New Member Sign Up page. 3. Enter your [employer] Access Code exactly as it appears below. You will not need to use this code after youve completed the sign-up process. If you do not sign up before the expiration date, you must request a new code. · [employer] Access Code: [email or web address] Expires: 7/10/2018  7:51 AM 
 
4. Enter the last four digits of your Social Security Number (xxxx) and Date of Birth (mm/dd/yyyy) as indicated and click Submit. You will be taken to the next sign-up page. 5. Create a [employer] ID. This will be your [employer] login ID and cannot be changed, so think of one that is secure and easy to remember. 6. Create a [employer] password. You can change your password at any time. 7. Enter your Password Reset Question and Answer. This can be used at a later time if you forget your password. 8. Enter your e-mail address.  You will receive e-mail notification when new information is available in [employer]. 9. Click Sign Up. You can now view and download portions of your medical record. 10. Click the Download Summary menu link to download a portable copy of your medical information. If you have questions, please visit the Frequently Asked Questions section of the [employer] website. Remember, Tr[employer] is NOT to be used for urgent needs. For medical emergencies, dial 911. Now available from your iPhone and Android! Introducing Oscar Jackman As a [employer] [facility] patient, I wanted to make you aware of our electronic visit tool called Oscar Jackman. LocalB[employer] 24/7 allows you to connect within minutes with a medical provider 24 hours a day, seven days a week via a mobile device or tablet or logging into a secure website from your computer. You can access Oscar Jackman from anywhere in the [place]. A virtual visit might be right for you when you have a simple condition and feel like you just dont want to get out of bed, or cant get away from work for an appointment, when your regular [facility] provider is not available (evenings, weekends or holidays), or when youre out of town and need minor care. Electronic visits cost only $49 and if the [employer] 24/7 provider determines a prescription is needed to treat your condition, one can be electronically transmitted to a nearby pharmacy*. Please take a moment to enroll today if you have not already done so. The enrollment process is free and takes just a few minutes. To enroll, please download the LocalBanya 24/7 yessi to your tablet or phone, or visit [email or web address] to enroll on your computer. And, as an [address] [place] [address] patient with a Study2gether account, the results of your visits will be scanned into your electronic medical record and your primary care provider will be able to view the scanned results. We urge you to continue to see your regular [facility] provider for your ongoing medical care. And while your primary care provider may not be the one available when you seek a Oscar Giovaniprateek virtual visit, the peace of mind you get from getting a real diagnosis real time can be priceless. For more information on Oscar Matuteedithfin, view our Frequently Asked Questions (FAQs) at [email or web address]. Sincerely, 
 
Eileen Burns MD 
Chief Medical Officer [address] *:  certain medications cannot be prescribed via Oscar Jackman Providers Seen During Your Hospitalization Provider Specialty Primary office phone Nia Gonzalez, [address] [facility] Surgery [phone] Your Primary Care Physician (PCP) Primary Care Physician Office Phone Office Fax Windy Juan [phone] You are allergic to the following No active allergies Recent Documentation Height Weight BMI OB Status Smoking Status 1.651 m 145.6 kg 53.42 kg/m2 IUD Never Smoker Emergency Contacts Name Discharge Info Relation Home Work Mobile [facility] [employer] CAREGIVER [3] Mother [14] [phone] [address]  Parent [1] [phone] Patient Belongings The following personal items are in your possession at time of discharge: 
  Dental Appliances: None  Visual Aid: None             Clothing: Other (comment) (clothing bag to pacu) Please provide this summary of care documentation to your next provider. Signatures-by signing, you are acknowledging that this After Visit Summary has been reviewed with you and you have received a copy. Patient Signature:  ____________________________________________________________ Date:  ____________________________________________________________  
  
Fayrene Retort  Provider Signature: ____________________________________________________________ Date:  ____________________________________________________________

## 2018-04-26 NOTE — INTERVAL H&P NOTE
H&P Update:  Aditya Sykes was seen and examined. History and physical has been reviewed. The patient has been examined. There have been no significant clinical changes since the completion of the originally dated History and Physical.  Patient identified by surgeon; surgical site was confirmed by patient and surgeon.     Signed By: Dany Sage MD     April 26, 2018 7:21 AM

## 2018-04-26 NOTE — H&P (VIEW-ONLY)
Bariatric Surgery History and Physical  Silvia Trevino is a 32 y.o. female scheduled for laparoscopic sleeve gastrectomy on April 26, 2018 with Dr. Nikko Frank at [facility]. Patient comes in office today for history and physical, and to receive pre-operative liver shrinking diet. Patient height is 5'5'', weight is 312 pounds, BMI is 51.92, frame is medium. Neck circumference is 16.5 inches, waist is 52.5 inches, and hip circumference is 60 inches    HPI     Bariatric comorbidities present: GERD  Patient Active Problem List    Diagnosis Date Noted    Iron deficiency anemia 04/11/2018    Morbid obesity with BMI of 50.0-59.9, adult ([facility] [address] 07/21/2017    GERD (gastroesophageal reflux disease) 07/21/2017     Past Medical History:   Diagnosis Date    Arrhythmia     MURMUR AS A CHILD    Bronchitis     Dyspepsia and other specified disorders of function of stomach     GERD (gastroesophageal reflux disease)     Iron deficiency anemia 4/11/2018      Past Surgical History:   Procedure Laterality Date    HX GYN      c section x2      Social History   Substance Use Topics    Smoking status: Never Smoker    Smokeless tobacco: Never Used    Alcohol use Yes      Comment:  1 X A MONTH      Family History   Problem Relation Age of Onset    Diabetes Mother     Elevated Lipids Maternal Grandmother     Psychiatric Disorder Maternal Grandmother     Heart Disease Maternal Grandfather     Psychiatric Disorder Maternal Grandfather     Heart Disease Father       Prior to Admission medications    Medication Sig Start Date End Date Taking? Authorizing Provider   hyoscyamine SL (LEVSIN/SL) 0.125 mg SL tablet 1 Tab by SubLINGual route every four (4) hours as needed for Cramping. 4/11/18  Yes Cynthia Patel NP   omeprazole (PRILOSEC) 20 mg capsule Take 1 Cap by mouth daily.  Indications: PREVENTION OF STRESS ULCER 4/11/18  Yes Cynthia Patel NP   enoxaparin (LOVENOX) 40 mg/0.4 mL 0.4 mL by SubCUTAneous route daily for 14 days. 4/11/18 4/25/18 Yes Donis Mckinnon, NP   ondansetron (ZOFRAN ODT) 4 mg disintegrating tablet Take 1 Tab by mouth every six (6) hours as needed for Nausea. 4/11/18  Yes Donis Johnsons, NP   loratadine-pseudoephedrine (CLARITIN-D 24 HOUR)  mg per tablet Take 1 Tab by mouth daily. 12/12/17  Yes Isaías Ko PA-C   levonorgestrel (MIRENA) 20 mcg/24 hr (5 years) IUD 1 Each by IntraUTERine route once. Indications: PREGNANCY CONTRACEPTION   Yes Phys Other, MD Vignesh Adan MD is primary care provider. No Known Allergies     Review of Systems   Constitutional: Negative for chills, fever and malaise/fatigue. HENT: Negative for congestion, hearing loss and sinus pain. Eyes: Negative for blurred vision, double vision and photophobia. Respiratory: Negative for cough, sputum production, shortness of breath and stridor. Cardiovascular: Negative for chest pain, palpitations and leg swelling. Gastrointestinal: Negative for abdominal pain, blood in stool, constipation, diarrhea, heartburn and nausea. Occasional reflux   Genitourinary: Negative for dysuria and hematuria. No kidney stone history   Musculoskeletal: Positive for joint pain. Negative for back pain, myalgias and neck pain. Skin: Negative for itching and rash. Neurological: Negative for dizziness, seizures and headaches. Endo/Heme/Allergies:        Denies diabetes, no thyroid disease, no gout. History of iron deficiency anemia. Psychiatric/Behavioral: Negative for depression, hallucinations, memory loss, substance abuse and suicidal ideas. The patient is not nervous/anxious and does not have insomnia. Physical Exam   Constitutional: She appears well-developed and well-nourished. No distress. Cardiovascular: Normal rate, regular rhythm, S1 normal, S2 normal and normal pulses. Exam reveals no gallop. Murmur heard.    Systolic murmur is present with a grade of 1/6   Pulmonary/Chest: Effort normal and breath sounds normal. No respiratory distress. She has no wheezes. She has no rales. Abdominal: Soft. Bowel sounds are normal. She exhibits no distension. There is no tenderness. There is no rebound and no guarding. Abdomen\ obese, non-tender, non-distended. Musculoskeletal: Normal range of motion. She exhibits no edema. Lymphadenopathy:        Head (right side): No submental, no submandibular, no tonsillar, no preauricular and no posterior auricular adenopathy present. Head (left side): No submental, no submandibular, no tonsillar, no preauricular and no posterior auricular adenopathy present. She has no cervical adenopathy. She has no axillary adenopathy. Neurological: She is alert. She has normal strength. No cranial nerve deficit or sensory deficit. Skin: Skin is warm and dry. No rash noted. No cyanosis or erythema. Nails show no clubbing. Psychiatric: She has a normal mood and affect. Her behavior is normal. Thought content normal.   Blood pressure 126/83, pulse 84, temperature 97.9 °F (36.6 °C), temperature source Oral, resp. rate 20, height 5' 5\" (1.651 m), weight 312 lb (141.5 kg), SpO2 98 %. ASSESSMENT and PLAN    Morbid obesity with body mass index of 51.92 Co-morbids listed above    Patient is scheduled for laparoscopic sleeve gastrectomy on April 26, 2018 with Dr. Franc Calabrese at [facility] patient in regard to post diet restrictions, follow- up office visits, follow- up care, and follow up medications. Prescriptions for Zofran, Levsin, Omeprazole, and Lovenox given. Patient as received educational booklet and vitamin list. Patient to start bariatric vitamin regime. Reviewed available pre-operative labs results. Informed patient iron level and CBC level low, patient to start multivitamin with iron now. Other labs within appropriate range.  Answered all questions, patient wishes    Signed By: Ro Ellis NP     April 11, 2018       27 minutes was spent with patient.

## 2018-04-26 NOTE — ANESTHESIA PREPROCEDURE EVALUATION
Anesthetic History   No history of anesthetic complications            Review of Systems / Medical History  Patient summary reviewed, nursing notes reviewed and pertinent labs reviewed    Pulmonary  Within defined limits                 Neuro/Psych   Within defined limits           Cardiovascular  Within defined limits                Exercise tolerance: >4 METS     GI/Hepatic/Renal     GERD           Endo/Other        Morbid obesity     Other Findings              Physical Exam    Airway  Mallampati: II  TM Distance: > 6 cm  Neck ROM: normal range of motion   Mouth opening: Normal     Cardiovascular  Regular rate and rhythm,  S1 and S2 normal,  no murmur, click, rub, or gallop             Dental  No notable dental hx       Pulmonary  Breath sounds clear to auscultation               Abdominal  GI exam deferred       Other Findings            Anesthetic Plan    ASA: 3  Anesthesia type: general          Induction: Intravenous  Anesthetic plan and risks discussed with: Patient

## 2018-04-26 NOTE — ROUTINE PROCESS
.Bedside and Verbal shift change report given to Maria G (oncoming nurse) by Rosemary Forman (offgoing nurse). Report included the following information SBAR, Kardex, OR Summary, Intake/Output, Recent Results, Med Rec Status and Cardiac Rhythm Sinus Arrythmia.

## 2018-04-27 LAB
ANION GAP SERPL CALC-SCNC: 8 MMOL/L (ref 5–15)
BUN SERPL-MCNC: 9 MG/DL (ref 6–20)
BUN/CREAT SERPL: 14 (ref 12–20)
CALCIUM SERPL-MCNC: 8.5 MG/DL (ref 8.5–10.1)
CHLORIDE SERPL-SCNC: 108 MMOL/L (ref 97–108)
CO2 SERPL-SCNC: 25 MMOL/L (ref 21–32)
CREAT SERPL-MCNC: 0.64 MG/DL (ref 0.55–1.02)
ERYTHROCYTE [DISTWIDTH] IN BLOOD BY AUTOMATED COUNT: 19.5 % (ref 11.5–14.5)
GLUCOSE SERPL-MCNC: 94 MG/DL (ref 65–100)
HCT VFR BLD AUTO: 34.3 % (ref 35–47)
HGB BLD-MCNC: 10.2 G/DL (ref 11.5–16)
MCH RBC QN AUTO: 21.4 PG (ref 26–34)
MCHC RBC AUTO-ENTMCNC: 29.7 G/DL (ref 30–36.5)
MCV RBC AUTO: 71.9 FL (ref 80–99)
NRBC # BLD: 0 K/UL (ref 0–0.01)
NRBC BLD-RTO: 0 PER 100 WBC
PLATELET # BLD AUTO: 325 K/UL (ref 150–400)
PMV BLD AUTO: 10.4 FL (ref 8.9–12.9)
POTASSIUM SERPL-SCNC: 4.3 MMOL/L (ref 3.5–5.1)
RBC # BLD AUTO: 4.77 M/UL (ref 3.8–5.2)
SODIUM SERPL-SCNC: 141 MMOL/L (ref 136–145)
WBC # BLD AUTO: 11.6 K/UL (ref 3.6–11)

## 2018-04-27 PROCEDURE — 80048 BASIC METABOLIC PNL TOTAL CA: CPT | Performed by: SURGERY

## 2018-04-27 PROCEDURE — 74011250637 HC RX REV CODE- 250/637: Performed by: SURGERY

## 2018-04-27 PROCEDURE — 85027 COMPLETE CBC AUTOMATED: CPT | Performed by: SURGERY

## 2018-04-27 PROCEDURE — 74011250636 HC RX REV CODE- 250/636: Performed by: SURGERY

## 2018-04-27 PROCEDURE — 36415 COLL VENOUS BLD VENIPUNCTURE: CPT | Performed by: SURGERY

## 2018-04-27 PROCEDURE — 65660000000 HC RM CCU STEPDOWN

## 2018-04-27 RX ORDER — HYDROMORPHONE HYDROCHLORIDE 4 MG/1
4 TABLET ORAL
Status: DISCONTINUED | OUTPATIENT
Start: 2018-04-27 | End: 2018-04-28 | Stop reason: HOSPADM

## 2018-04-27 RX ORDER — HYDROMORPHONE HYDROCHLORIDE 2 MG/1
2-4 TABLET ORAL
Qty: 40 TAB | Refills: 0 | Status: SHIPPED | OUTPATIENT
Start: 2018-04-27 | End: 2018-05-10 | Stop reason: ALTCHOICE

## 2018-04-27 RX ORDER — GUAIFENESIN 600 MG/1
600 TABLET, EXTENDED RELEASE ORAL EVERY 12 HOURS
Status: DISCONTINUED | OUTPATIENT
Start: 2018-04-27 | End: 2018-04-28 | Stop reason: HOSPADM

## 2018-04-27 RX ADMIN — HYDROMORPHONE HYDROCHLORIDE 4 MG: 4 TABLET ORAL at 12:20

## 2018-04-27 RX ADMIN — Medication 10 ML: at 22:13

## 2018-04-27 RX ADMIN — HYDROMORPHONE HYDROCHLORIDE 4 MG: 4 TABLET ORAL at 08:10

## 2018-04-27 RX ADMIN — ONDANSETRON 4 MG: 2 INJECTION INTRAMUSCULAR; INTRAVENOUS at 07:13

## 2018-04-27 RX ADMIN — HYDROMORPHONE HYDROCHLORIDE 4 MG: 4 TABLET ORAL at 17:24

## 2018-04-27 RX ADMIN — SODIUM CHLORIDE, SODIUM LACTATE, POTASSIUM CHLORIDE, AND CALCIUM CHLORIDE 125 ML/HR: 600; 310; 30; 20 INJECTION, SOLUTION INTRAVENOUS at 15:58

## 2018-04-27 RX ADMIN — LORAZEPAM 1 MG: 2 INJECTION INTRAMUSCULAR; INTRAVENOUS at 15:31

## 2018-04-27 RX ADMIN — GUAIFENESIN 600 MG: 600 TABLET, EXTENDED RELEASE ORAL at 20:32

## 2018-04-27 RX ADMIN — ONDANSETRON 4 MG: 2 INJECTION INTRAMUSCULAR; INTRAVENOUS at 17:24

## 2018-04-27 RX ADMIN — HYDROMORPHONE HYDROCHLORIDE 1 MG: 2 INJECTION INTRAMUSCULAR; INTRAVENOUS; SUBCUTANEOUS at 01:54

## 2018-04-27 RX ADMIN — HYDROMORPHONE HYDROCHLORIDE 4 MG: 4 TABLET ORAL at 22:13

## 2018-04-27 RX ADMIN — ENOXAPARIN SODIUM 40 MG: 40 INJECTION SUBCUTANEOUS at 07:13

## 2018-04-27 RX ADMIN — PROCHLORPERAZINE EDISYLATE 5 MG: 5 INJECTION INTRAMUSCULAR; INTRAVENOUS at 11:44

## 2018-04-27 RX ADMIN — KETOROLAC TROMETHAMINE 15 MG: 30 INJECTION, SOLUTION INTRAMUSCULAR at 07:13

## 2018-04-27 NOTE — PROGRESS NOTES
Problem: Patient Education: Go to Patient Education Activity  Goal: Patient/Family Education  Outcome: Resolved/Met Date Met: 04/27/18  NUTRITION     Chart reviewed. Post-op bariatric diet instruction completed. Will gladly follow up for additional questions as needed. Thank you.      Galen Carvajal RD

## 2018-04-27 NOTE — PROGRESS NOTES
Pt doing well but just a little slow on PO intake right now. She will stay till tomorrow.     Catherine Hargrove

## 2018-04-27 NOTE — PROGRESS NOTES
[facility]    POD #1    Subjective     Doing well, tolerating ice well, no N/V, pain well controlled    Objective     Patient Vitals for the past 24 hrs:   Temp Pulse Resp BP SpO2   04/27/18 0702 98.5 °F (36.9 °C) 87 18 128/65 97 %   04/27/18 0302 97.9 °F (36.6 °C) 80 19 138/82 100 %   04/26/18 2300 97.8 °F (36.6 °C) 70 20 (!) 140/97 98 %   04/26/18 1909 97.6 °F (36.4 °C) 61 20 133/75 96 %   04/26/18 1522 98 °F (36.7 °C) 98 16 (!) 145/97 100 %   04/26/18 1443 - 90 21 142/84 100 %   04/26/18 1400 - (!) 104 26 (!) 134/94 100 %   04/26/18 1300 - 100 18 128/83 99 %   04/26/18 1215 - 99 24 133/82 100 %   04/26/18 1200 - (!) 101 26 139/90 91 %   04/26/18 1145 - 95 24 (!) 143/92 100 %   04/26/18 1130 - 99 28 133/83 99 %   04/26/18 1124 - - - - 99 %   04/26/18 1120 - - - - 100 %   04/26/18 1115 - 84 26 127/84 100 %   04/26/18 1100 - 73 24 126/80 99 %   04/26/18 1045 - (!) 102 23 143/89 100 %   04/26/18 1030 - (!) 103 24 (!) 143/95 99 %   04/26/18 1015 - (!) 103 25 (!) 149/96 100 %   04/26/18 1000 - 97 25 139/89 99 %   04/26/18 0945 - 74 28 (!) 146/95 98 %   04/26/18 0930 - 72 28 (!) 146/92 94 %   04/26/18 0925 - 75 19 (!) 141/96 95 %   04/26/18 0921 98.2 °F (36.8 °C) 75 22 138/90 95 %   04/26/18 0920 - 75 27 156/83 92 %         Date 04/26/18 0700 - 04/27/18 0659 04/27/18 0700 - 04/28/18 0659   Shift 0700-1859 1900-0659 24 Hour Total 0700-1859 1900-0659 24 Hour Total   I  N  T  A  K  E   P. O.  50 50         P. O.  50 50       I.V.  (mL/kg/hr) 2537.5  (1.5) 954.2  (0.5) 3491.7  (1)         I.V. 900  900         Volume (lactated Ringers infusion) 637.5 954.2 1591.7         Volume (lactated Ringers infusion) 1000  1000       Shift Total  (mL/kg) 2537.5  (17.9) 1004.2  (6.9) 3541.7  (24.3)      O  U  T  P  U  T   Urine  (mL/kg/hr)  500  (0.3) 500  (0.1)         Urine Voided  500 500         Urine Occurrence(s) 1 x  1 x       Blood 20  20         Estimated Blood Loss 20  20       Shift Total  (mL/kg) 20  (0.1) 500  (3.4) 520  (3.6)      NET 2517.5 504.2 3021.7      Weight (kg) 141.5 145.6 145.6 145.6 145.6 145.6       PE  Pulm - CTAB  CV - RRR  Abd - soft, ND, BS present, incisions c/d/i    Labs  Recent Results (from the past 12 hour(s))   METABOLIC PANEL, BASIC    Collection Time: 04/27/18  3:55 AM   Result Value Ref Range    Sodium 141 136 - 145 mmol/L    Potassium 4.3 3.5 - 5.1 mmol/L    Chloride 108 97 - 108 mmol/L    CO2 25 21 - 32 mmol/L    Anion gap 8 5 - 15 mmol/L    Glucose 94 65 - 100 mg/dL    BUN 9 6 - 20 MG/DL    Creatinine 0.64 0.55 - 1.02 MG/DL    BUN/Creatinine ratio 14 12 - 20      GFR est AA >60 >60 ml/min/1.73m2    GFR est non-AA >60 >60 ml/min/1.73m2    Calcium 8.5 8.5 - 10.1 MG/DL   CBC W/O DIFF    Collection Time: 04/27/18  3:55 AM   Result Value Ref Range    WBC 11.6 (H) 3.6 - 11.0 K/uL    RBC 4.77 3.80 - 5.20 M/uL    HGB 10.2 (L) 11.5 - 16.0 g/dL    HCT 34.3 (L) 35.0 - 47.0 %    MCV 71.9 (L) 80.0 - 99.0 FL    MCH 21.4 (L) 26.0 - 34.0 PG    MCHC 29.7 (L) 30.0 - 36.5 g/dL    RDW 19.5 (H) 11.5 - 14.5 %    PLATELET 325 798 - 829 K/uL    MPV 10.4 8.9 - 12.9 FL    NRBC 0.0 0  WBC    ABSOLUTE NRBC 0.00 0.00 - 0.01 K/uL         Assessment     Elaine Sood is a 32 y. o.yr old female s/p laparoscopic sleeve gastrectomy    Plan     Doing well post op  Start liquid today  OOB more today  Cont IVF  lovenox  Possible DC home later today    Brigette Looney MD

## 2018-04-27 NOTE — PROGRESS NOTES
Bedside shift change report given to Osiris Cotter RN (oncoming nurse) by Julieta Andrea RN (offgoing nurse). Report included the following information SBAR, Kardex, Intake/Output, MAR, Accordion and Cardiac Rhythm NSR.

## 2018-04-27 NOTE — PROGRESS NOTES
Bedside and Verbal shift change report given to april (oncoming nurse) by Ad Wing (offgoing nurse). Report included the following information SBAR, Kardex, Intake/Output, Recent Results and Cardiac Rhythm nsr.

## 2018-04-27 NOTE — DISCHARGE INSTRUCTIONS
DISCHARGE SUMMARY from Nurse    PATIENT INSTRUCTIONS:    These are general instructions for a healthy lifestyle:    No smoking/ No tobacco products/ Avoid exposure to second hand smoke  Surgeon General's Warning:  Quitting smoking now greatly reduces serious risk to your health. Obesity, smoking, and sedentary lifestyle greatly increases your risk for illness    A healthy diet, regular physical exercise & weight monitoring are important for maintaining a healthy lifestyle    You may be retaining fluid if you have a history of heart failure or if you experience any of the following symptoms:  Weight gain of 3 pounds or more overnight or 5 pounds in a week, increased swelling in our hands or feet or shortness of breath while lying flat in bed. Please call your doctor as soon as you notice any of these symptoms; do not wait until your next office visit. Recognize signs and symptoms of STROKE:    F-face looks uneven    A-arms unable to move or move unevenly    S-speech slurred or non-existent    T-time-call 911 as soon as signs and symptoms begin-DO NOT go       Back to bed or wait to see if you get better-TIME IS BRAIN. Warning Signs of HEART ATTACK     Call 911 if you have these symptoms:   Chest discomfort. Most heart attacks involve discomfort in the center of the chest that lasts more than a few minutes, or that goes away and comes back. It can feel like uncomfortable pressure, squeezing, fullness, or pain.  Discomfort in other areas of the upper body. Symptoms can include pain or discomfort in one or both arms, the back, neck, jaw, or stomach.  Shortness of breath with or without chest discomfort.  Other signs may include breaking out in a cold sweat, nausea, or lightheadedness. Don't wait more than five minutes to call 911 - MINUTES MATTER! Fast action can save your life. Calling 911 is almost always the fastest way to get lifesaving treatment.  Emergency Medical Services staff can begin treatment when they arrive -- up to an hour sooner than if someone gets to the hospital by car. The discharge information has been reviewed with the patient. The patient verbalized understanding. Discharge medications reviewed with the patient and appropriate educational materials and side effects teaching were provided. ___________________________________________________________________________________________________________________________________Laparoscopic Sleeve Gastrectomy    Patient Discharge Instructions    Ese Steel / [id number] : 1990    Admitted 2018 Discharged:        · It is important that you take the medication exactly as they are prescribed. · Keep your medication in the bottles provided by the pharmacist and keep a list of the medication names, dosages, and times to be taken in your wallet. · Do not take other medications without consulting your doctor. · Wound care: You may shower starting tomorrow. Do not remove the dermabond on the incision, it will fall of on its own in a few weeks. · No heavy lifting or strenuous activity for 2wks after the operation    Please add a B12 500mcg supplement daily. Because part of your stomach has been removed, you are more at risk for a B12 deficiency. Diet:  Full liquid Bariatric diet as outlined in your education book and on the handout provided by the dietician. Gastric Sleeve  Patient Discharge Instructions  [address] [facility]   [phone]    1. Activities: You may be active walking, stair climbing, and doing light weight activities with one to two-pound weights, sitting in a chair using different arm motions. Major restrictions include driving until your first office visit and lifting anything heavier than ten pounds. It is very important that you walk frequently.  In addition to walking, you should continue to use your incentive spirometer (breathing exercises) throughout the day.    2. Caring for vour incision (s}: Your surgical incision(s) will be covered with Derma bond (super glue). You may shower as desired and simply pat dry the area over the incision(s). There may occasionally be seepage of yellowish to yellowish-maroon dissolved fat from your wound, which is of no major concern as long as the wound is not red, hardened in the area of the drainage, or if the drainage has a foul smell. If there are any questions, call our office for further instructions. If there is this type of dissolved fat drainage,  the shower and gently express the fluid from your wound. Then keep gauze pads over the area to protect both the wound and your clothes. 3. When to call the office immediately:      *Chest pain (not associated with eating/drinking)  *Shortness of breath (more than normal)  *Sudden pain and/or redness in calf  *Fever greater than 101F  *Persistent nausea and/or vomiting (unable to keep down any liquids)  *Bleeding from incision(s)  *Severe abdominal pain  *Any other concerning symptoms    4. Diet: There are three main priorities as far as your diet is concerned---    a. Clear Liquids-drink from 1 oz. cups or standard shot glass. These liquids are non-carbonated, no added sugar, and not irritating to your stomach. They may include water, tea, coffee\" 100% no added sugar juices (avoid citrus) , clear broth, blenderized clear soups such as [employer] Healthy Request Chicken Noodle and Chicken & Rice, Sugar-free products including popsicles, Eliseo-Aid, and Crystal Lite. b. Vitamins: Multi-vitamin with iron, Vitamin B-12 and D capsule may be taken as recommended. c. Protein Intake: During your initial two-three weeks when your body is switching from burning glucose to directly burning fat, there is an attempt by your body to use protein as a fuel. To protect that protein, we like you to exercise as listed above, and to try to take in 50-60 grams of protein per day.  This can be done with four 8 oz. serving of skim milk and Low Carb Barney Instant Breakfast. Each 8 oz. should be considered a meal-breakfast, lunch, or supper, and during this mealtime it should be the sole ingested substance. Stop your clear liquids for 20 minutes before your start on the instant breakfast, which is sipped 1 oz. at a time. You may also try the following substitute for Barney Instant Breakfast: skim milk-fat free powdered milk + Egg Beaters + flavor extract. If desired, ice may be added and blenderized in the . If the milk upsets your stomach, you may purchase Lactaid tablets from any drug store. Lactaid skim milk may be purchased at most major supermarkets. Another alternative is Boost Diabetic, which is Lactose-free and ready to drink. There are many protein supplements on the market. Whey and Soy based protein powders/drinks are acceptable. If you have any questions about specific products please call the office. d. Other foods may be taken if you have met the requirements of a, b, and c. These foods should be low fat, low sugar, and low spice, and blenderized to the point that, if needed, could be sucked through a straw. One of the favorite treats is dietetic canned fruit blenderized with a combination of its juices and crushed ice, and then eaten in small amounts with a spoon. A smooth low-calorie, low-fat yogurt (should be 100 calories or less) is acceptable as is low-fat or fat-free cottage cheese. If you are having difficulty with your diet, please call the office. 5. Medications: Take no more than 2 pills at a time. Wait 20 minutes between pills. a. Vitamins as listed above---multi-vitamin with iron twice a day, B-12 once a day, vitamin D.    b. Acid reducing medicine--Will be prescribed by your surgeon at discharge. c. Mylanta Plus--one to two teaspoons as needed for belching or gas (other gas relieving medicines are also acceptable Beano, GasX, etc).     d. Bowel Regulation--mild laxatives are permissible such as Milk of Magnesia, Dulcolax (either by mouth or suppository). If you are accustomed to using a [id number] laxative not mentioned, you may continue to use it. Fibercon tablets or Benefiber may be taken as a fiber supplement to regulate bowel movements. Fibercon tablets should be broken in half and taken in half and taken one half in the morning and one half in the evening. Benefiber (1 tsp.) can be added to your protein drink(s). It has no taste or added thickness. Imodium AD may be taken as needed for diarrhea.    e. Pain medication-one to two every four hours as needed for pain control. If pain is mild, try extra-strength Tylenol first.    f. Do not take any pain or arthritis medication such as Aspirin, Advil, Aleve, Naprosyn, or any other nonsteroidal anti-inflammatory medication unless approved by your surgeon. A Tylenol product is OK.    g. Preadmission medications may be resumed, but this should be discussed with your doctor before your discharge from the hospital.    6.  Follow up Office Visits:  call our office at [phone] if you have any questions or concerns. Your appointment should be 2 weeks from your surgery date. Do not plan on returning to work prior to this office visit unless discussed with your doctor. Future Appointments  Date Time Provider Papo Tejeda   5/10/2018 10:00 AM THAI Grimes SCHED   5/24/2018 10:20 AM THAI Zhong SCHED   6/7/2018 10:00 AM THAI Zhong SCHED              Information obtained by :  I understand that if any problems occur once I am at home I am to contact my physician. I understand and acknowledge receipt of the instructions indicated above.                                                                                                                                            Physician's or R.N.'s Signature Date/Time                                                                                                                                              Patient or Representative Signature                                                          Date/Time         MD Karie Frias, FABIÁNP [facility], [address] [facility] [address] [facility]

## 2018-04-27 NOTE — PROGRESS NOTES
Problem: Pressure Injury - Risk of  Goal: *Prevention of pressure injury  Document Julio Scale and appropriate interventions in the flowsheet. Outcome: Progressing Towards Goal  Pressure Injury Interventions:  Sensory Interventions: Pressure redistribution bed/mattress (bed type), Assess changes in LOC         Activity Interventions: Pressure redistribution bed/mattress(bed type), Increase time out of bed    Mobility Interventions: Pressure redistribution bed/mattress (bed type), Turn and reposition approx. every two hours(pillow and wedges)    Nutrition Interventions: Document food/fluid/supplement intake                    Problem: Falls - Risk of  Goal: *Absence of Falls  Document Leigh Ann Fall Risk and appropriate interventions in the flowsheet.    Outcome: Progressing Towards Goal  Fall Risk Interventions:            Medication Interventions: Patient to call before getting OOB, Teach patient to arise slowly

## 2018-04-28 VITALS
WEIGHT: 293 LBS | HEART RATE: 85 BPM | HEIGHT: 65 IN | TEMPERATURE: 98.4 F | SYSTOLIC BLOOD PRESSURE: 158 MMHG | OXYGEN SATURATION: 95 % | DIASTOLIC BLOOD PRESSURE: 97 MMHG | RESPIRATION RATE: 16 BRPM | BODY MASS INDEX: 48.82 KG/M2

## 2018-04-28 PROCEDURE — 74011250637 HC RX REV CODE- 250/637: Performed by: SURGERY

## 2018-04-28 PROCEDURE — 74011250636 HC RX REV CODE- 250/636: Performed by: SURGERY

## 2018-04-28 RX ADMIN — HYDROMORPHONE HYDROCHLORIDE 4 MG: 4 TABLET ORAL at 07:12

## 2018-04-28 RX ADMIN — GUAIFENESIN 600 MG: 600 TABLET, EXTENDED RELEASE ORAL at 08:24

## 2018-04-28 RX ADMIN — ENOXAPARIN SODIUM 40 MG: 40 INJECTION SUBCUTANEOUS at 07:09

## 2018-04-28 RX ADMIN — PROCHLORPERAZINE EDISYLATE 5 MG: 5 INJECTION INTRAMUSCULAR; INTRAVENOUS at 00:00

## 2018-04-28 NOTE — PROGRESS NOTES
Bedside shift change report given to [place] and S[place]hea (oncoming nurse) by Vic Reyes (offgoing nurse). Report included the following information SBAR, Intake/Output, MAR, Recent Results and Cardiac Rhythm NSR.

## 2018-04-28 NOTE — PROGRESS NOTES
Daily Progress Note  [facility] at [address] Date: 2018  Post-Operative Day: 2 from Procedure(s):  LAPAROSCOPIC GASTRECTOMY SLEEVE, ENDOSCOPY      Subjective:     Last 24 hrs: Pain well controlled. Doing much better with liquids today. Feels ready to go home. Objective:     Blood pressure (!) 160/95, pulse 88, temperature 98.9 °F (37.2 °C), resp. rate 17, height 5' 5\" (1.651 m), weight 145.6 kg (320 lb 15.8 oz), SpO2 97 %. Temp (24hrs), Av.1 °F (36.7 °C), Min:97.6 °F (36.4 °C), Max:98.9 °F (37.2 °C)      _____________________  Physical Exam:     Alert and Oriented, sitting up in bed, in no acute distress. Cardiovascular: RRR, no peripheral edema  Lungs:CTAB   Abdomen: soft, appropriately tender. Incisions healing well. Assessment:   Active Problems: Morbid obesity with BMI of 50.0-59.9, adult ([facility] [address] (2017)      S/p laparoscopic sleeve gastrectomy      Plan:     Stable for discharge  F/U in office        Nina Trent, [address] [facility] at [place] [address],  [facility], [address] [place] [address]  [phone]    Data Review:    Recent Labs      18   0355   WBC  11.6*   HGB  10.2*   HCT  34.3*   PLT  325     Recent Labs      18   0355   NA  141   K  4.3   CL  108   CO2  25   GLU  94   BUN  9   CREA  0.64   CA  8.5     No results for input(s): AML, LPSE in the last 72 hours.         ______________________  Medications:    Current Facility-Administered Medications   Medication Dose Route Frequency    HYDROmorphone (DILAUDID) tablet 4 mg  4 mg Oral Q4H PRN    guaiFENesin ER (MUCINEX) tablet 600 mg  600 mg Oral Q12H    scopolamine (TRANSDERM-SCOP) 1 mg over 3 days 1 Patch  1 Patch TransDERmal Q72H    lactated Ringers infusion  125 mL/hr IntraVENous CONTINUOUS    sodium chloride (NS) flush 5-10 mL  5-10 mL IntraVENous Q8H    sodium chloride (NS) flush 5-10 mL  5-10 mL IntraVENous PRN    naloxone (NARCAN) injection 0.4 mg 0.4 mg IntraVENous PRN    HYDROmorphone (DILAUDID) injection 1 mg  1 mg IntraVENous Q3H PRN    ondansetron (ZOFRAN) injection 4 mg  4 mg IntraVENous Q6H    Or    prochlorperazine (COMPAZINE) injection 5 mg  5 mg IntraVENous Q6H    LORazepam (ATIVAN) injection 1 mg  1 mg IntraVENous Q6H PRN    enoxaparin (LOVENOX) injection 40 mg  40 mg SubCUTAneous Q24H

## 2018-04-28 NOTE — ROUTINE PROCESS
12:00. Reviewed discharge instructions, allowed time for questions and answers.  Pt report her mother on the way to pick her up

## 2018-04-28 NOTE — PROGRESS NOTES
Problem: Gastric Sleeve Pathway / Bariatric Revision Pathway: Post-Op Day 1  Goal: *No signs and symptoms of infection or wound complications  Outcome: Progressing Towards Goal  Afebrile with no erythema or swelling  Goal: *Optimal pain control at patient's stated goal  Outcome: Progressing Towards Goal  Pt denies significant pain at this time. Goal: *Adequate urinary output (equal to or greater than 30 milliliters/hour)  Outcome: Progressing Towards Goal  Pt voiding with no difficulty    Problem: Pressure Injury - Risk of  Goal: *Prevention of pressure injury  Document Julio Scale and appropriate interventions in the flowsheet. Outcome: Progressing Towards Goal  Pressure Injury Interventions:  Sensory Interventions: Pressure redistribution bed/mattress (bed type), Assess changes in LOC         Activity Interventions: Pressure redistribution bed/mattress(bed type), Increase time out of bed    Mobility Interventions: Pressure redistribution bed/mattress (bed type), Turn and reposition approx. every two hours(pillow and wedges)    Nutrition Interventions: Document food/fluid/supplement intake                    Problem: Falls - Risk of  Goal: *Absence of Falls  Document Leigh Ann Fall Risk and appropriate interventions in the flowsheet.    Outcome: Progressing Towards Goal  Fall Risk Interventions:            Medication Interventions: Patient to call before getting OOB

## 2018-04-30 ENCOUNTER — TELEPHONE (OUTPATIENT)
Dept: SURGERY | Age: 28
End: 2018-04-30

## 2018-04-30 NOTE — TELEPHONE ENCOUNTER
----- Message from Griselda Iniguez LPN sent at 9618 10:48 AM EDT -----  Regardin hr po call  LAPAROSCOPIC GASTRECTOMY SLEEVE, ENDOSCOPY on 18    Discharged on 18

## 2018-04-30 NOTE — TELEPHONE ENCOUNTER
[facility] [facility] Bariatric Program Activity of Daily Living (ADL) Assessment Sheet    Name:____________________________________   Date:__________    Surgeon Age Height Weight Surgery     Mobilization   Performed  without difficulty   Performed with difficulty   Performed with help (specify if need equipment)     Unable to perform     Can you roll in bed? Can you remain seated without back support? Can you sit up from a lying position? Can you stand up from toilet level? Can you bend down to pick something off the floor? Can you kneel down and get up? Can you walk? Y____N____        How far without becoming short of breath? Estimate in feet:____     Do you require assistive devices? If yes Device:_____    ___________    ____________    ____________           Can you walk on a steep surface? Can you walk on irregular surface? Can you climb up stairs? Can you remain standing for 10 minutes? Daily Activities             Can you wipe  yourself after a bowel movement? Can you put on and take off all articles of clothing, including shoes? Can you bring down something that is higher than your head? Can you get in and out of a car? Can you comb your hair? Do you live alone? Yes_____ No_____ If No, with whom do you live?     Do you live in a: House_____ Apartment_____ Other_____    What level is your bedroom on:__________ Bathroom:__________    Number of steps to enter your home:__________ Are their handrails available:     1 side_____ Both_____            Answer the questions with a Yes or NO:      Bathroom features: Tub/shower combo_____ Shower Stall_____     Hand Held Shower_____ Non skid surface_____ [place] bars_____      Do you have a history of sleep apnea?_____ Do you snore_____         Do you wake up feeling tired and/or with headaches_____ Do you wake frequently    at night_____ Do you have problems with frequent sore throats_____ Do you have    daytime sleepiness_____       Do you sleep with CPAP machine_____ BIPAP machine_____      Do you anticipate any special needs that you may have at discharge? Y_____ N_____ If Yes, Please explain. What medical equipment/s do you currently have in your home? Signature:_________________________  Date:___________________      Instructor Name:_____________________________________________  Bariatric Post-Operative Phone Calls: 48 hour phone call    Diet:Question of any nausea and/or vomiting. Protein intake (goal is 60 grams of protein daily)   Poor____Fair____Good_x___Great____     Comment:________30 grams  daily______________________________________________________      ______________________________________________________________________    Hydration:Less than 32 ounces of water daily is fair to poor (Goal is 64 ounces per day)   Poor____ Fair____ Good__x__Great____    Comment:___56 ounces___________________________________________________________    ______________________________________________________________________      Ambulation:( walking at least 3 x week, for 15- 20 minutes)     Poor______ Fair______ Good__x____     Great______ Comment:__________________________________________________    ______________________________________________________________________      Urine Color: Question of any odor and color(should be reggie, pale, and clear) Dark______ Amber__x____ Pale___x___      Clear__x____ Comment:___________________________________________________                           ________________________________________________________________    Bowel movements: Question of any constipation- haven't had any bowel movements for more than 3 days. This could be related to protein intake and/or narcotic pain medication usage.      Comment: Had bm today                                    Pain: Left sided abdominal pain is normal (should be less than 3)  Question if pain medication is helpful. 10___ 9___ 8___ 7___ 6___ 5___ 4___ 3___     2___1_x__0___Comment:_________________________________________________    ______________________________________________________________________      Incision: (No redness, pain, swelling or fever) Healing Well__x____     Healed______Redness_________ Pain_________     Swelling_________ Fever__________(greater than 101 needs evaluation)    Comment:____________________________________________________________    ______________________________________________________________________  Use of incentive spirometer: Yes__x__       No           Next Appointment:__5/10/18____________                 Support Group: Yes______No__x____    Additional Comments:____________________________________________________________    ____________________________________________________________________      If more than one parameter is not met or considered poor, nurse needs to discuss with provider recommend for patient to be seen in the office as soon as possible or refer to the provider for follow-up. Reinforce to patient to use bariatric educational booklet as guide. It is appropriate to refer patient to the nutritionist to discuss more in detail of diet and nutrition.

## 2018-05-01 NOTE — PROGRESS NOTES
[facility] History and Physical    History of Present Illness:      Eric Stallworth is a 32 y.o. female who has been approved for laparoscopic sleeve gastrectomy. She has been in good health recently. Past Medical History:   Diagnosis Date    Arrhythmia     MURMUR AS A CHILD    Bronchitis     Dyspepsia and other specified disorders of function of stomach     GERD (gastroesophageal reflux disease)     Iron deficiency anemia 4/11/2018       Past Surgical History:   Procedure Laterality Date    HX GYN      c section x2         Current Outpatient Prescriptions:     loratadine-pseudoephedrine (CLARITIN-D 24 HOUR)  mg per tablet, Take 1 Tab by mouth daily. , Disp: 10 Tab, Rfl: 0    levonorgestrel (MIRENA) 20 mcg/24 hr (5 years) IUD, 1 Each by IntraUTERine route once. Indications: PREGNANCY CONTRACEPTION, Disp: , Rfl:     HYDROmorphone (DILAUDID) 2 mg tablet, Take 1-2 Tabs by mouth every four (4) hours as needed for Pain. Max Daily Amount: 24 mg., Disp: 40 Tab, Rfl: 0    hyoscyamine SL (LEVSIN/SL) 0.125 mg SL tablet, 1 Tab by SubLINGual route every four (4) hours as needed for Cramping., Disp: 30 Tab, Rfl: 0    omeprazole (PRILOSEC) 20 mg capsule, Take 1 Cap by mouth daily. Indications: PREVENTION OF STRESS ULCER, Disp: 30 Cap, Rfl: 2    ondansetron (ZOFRAN ODT) 4 mg disintegrating tablet, Take 1 Tab by mouth every six (6) hours as needed for Nausea., Disp: 30 Tab, Rfl: 0    No Known Allergies    Social History     Social History    Marital status: SINGLE     Spouse name: N/A    Number of children: N/A    Years of education: N/A     Occupational History    Not on file.      Social History Main Topics    Smoking status: Never Smoker    Smokeless tobacco: Never Used    Alcohol use Yes      Comment:  1 X A MONTH    Drug use: No    Sexual activity: Yes     Partners: Female     Birth control/ protection: Implant, IUD     Other Topics Concern    Not on file     Social History Narrative       Family History   Problem Relation Age of Onset    Diabetes Mother     Elevated Lipids Maternal Grandmother     Psychiatric Disorder Maternal Grandmother     Heart Disease Maternal Grandfather     Psychiatric Disorder Maternal Grandfather     Heart Disease Father        ROS   Constitutional: negative  Ears, Nose, Mouth, Throat, and Face: negative  Respiratory: negative  Cardiovascular: negative  Gastrointestinal: negative  Genitourinary:negative  Integument/Breast: negative  Hematologic/Lymphatic: negative  Behavioral/Psychiatric: negative  Allergic/Immunologic: negative      Physical Exam:     Visit Vitals    /83    Pulse 84    Temp 97.9 °F (36.6 °C)    Resp 18    Ht 5' 5\" (1.651 m)    Wt 312 lb (141.5 kg)    SpO2 98%    BMI 51.92 kg/m2       General - alert and oriented, no apparent distress  HEENT - no jaundice, no hearing imparement  Pulm - CTAB, no C/W/R  CV - RRR, no M/R/G  Abd - soft, ND, BS present, NTTP, no hernia  Ext - pulses intact in UE and LE bilaterally, no edema  Skin - supple, no rashes  Psychiatric - normal affect, good mood    Labs  none    Imaging  CXR - normal  I have reviewed and agree with all of the pertinent images    Assessment:     Lui Felix is a 32 y.o. female with morbid obesity    Recommendations:     1. She is ready for laparoscopic sleeve gastrectomy. I have discussed the above procedure with the patient in detail. We reviewed the benefits and possible complications of the surgery which include bleeding, infection, damage to adjacent organs, venous thromboembolism, need for repeat surgery, death and other unforseen complications. The patient agreed to proceed with the surgery. Mabelene Schaumann, MD    Ms. Dona House has a reminder for a \"due or due soon\" health maintenance. I have asked that she contact her primary care provider for follow-up on this health maintenance.

## 2018-05-04 ENCOUNTER — TELEPHONE (OUTPATIENT)
Dept: SURGERY | Age: 28
End: 2018-05-04

## 2018-05-04 NOTE — TELEPHONE ENCOUNTER
Made patient aware to use heat or cold to area if helpful, take tylenol and rest as needed. Patient will call back with questions or problems.

## 2018-05-04 NOTE — TELEPHONE ENCOUNTER
Patient states she is out of pain meds( 2 tabs left) and wanted to see if Dr. Latricia Hoskins could call something in for her. She said her pain level is a 6 after running a few errands today.

## 2018-05-10 ENCOUNTER — OFFICE VISIT (OUTPATIENT)
Dept: SURGERY | Age: 28
End: 2018-05-10

## 2018-05-10 VITALS
OXYGEN SATURATION: 98 % | TEMPERATURE: 97.9 F | BODY MASS INDEX: 48.82 KG/M2 | SYSTOLIC BLOOD PRESSURE: 110 MMHG | HEART RATE: 74 BPM | WEIGHT: 293 LBS | DIASTOLIC BLOOD PRESSURE: 80 MMHG | RESPIRATION RATE: 18 BRPM | HEIGHT: 65 IN

## 2018-05-10 DIAGNOSIS — Z09 SURGERY FOLLOW-UP: ICD-10-CM

## 2018-05-10 DIAGNOSIS — E66.01 MORBID OBESITY (HCC): Primary | ICD-10-CM

## 2018-05-10 NOTE — PROGRESS NOTES
1. Have you been to the ER, urgent care clinic since your last visit? Hospitalized since your last visit? No    2. Have you seen or consulted any other health care providers outside of the [address] [place] [address] since your last visit? Include any pap smears or colon screening. No   Xavierena Basim  Body composition    female  32 y.o. Vitals:    05/10/18 1013   BP: 110/80   Pulse: 74   Resp: 18   Temp: 97.9 °F (36.6 °C)   TempSrc: Oral   SpO2: 98%   Weight: 299 lb (135.6 kg)   Height: 5' 5\" (1.651 m)     Body mass index is 49.76 kg/(m^2). H&P weight on 4/11/18 312 pounds.

## 2018-05-10 NOTE — MR AVS SNAPSHOT
[address] [facility] [address] [address] 
[phone] Patient: Josephine Guerra MRN: [id number] [id number] Visit Information Date & Time Provider Department Dept. Phone Encounter #  
 5/10/2018 10:00 AM THAI Canales 137 188 [phone] [id number] Your Appointments 5/24/2018 10:20 AM  
POST OP 10 MIN with Emerson Anderson NP  
[facility] Appt Note: 2 po 4 week f/u  
 [address] [facility] [address]  
[address] [phone]  
  
    
 6/7/2018 10:00 AM  
POST OP 10 MIN with Emerson Anderson NP  
[facility] 22 [facility] ([facility]) Appt Note: 3 po 6 week f/u  
 [address] [facility] [address]  
[phone] Upcoming Health Maintenance Date Due DTaP/Tdap/Td series (1 - Tdap) 5/17/2011 PAP AKA CERVICAL CYTOLOGY 5/17/2011 Influenza Age 5 to Adult 8/1/2018 Allergies as of 5/10/2018  Review Complete On: 5/10/2018 By: Jonathan Kim LPN No Known Allergies Current Immunizations  Never Reviewed No immunizations on file. Not reviewed this visit Vitals BP Pulse Temp Resp Height(growth percentile) Weight(growth percentile) 110/80 (BP 1 Location: Right arm, BP Patient Position: Sitting) 74 97.9 °F (36.6 °C) (Oral) 18 5' 5\" (1.651 m) 299 lb (135.6 kg) SpO2 BMI OB Status Smoking Status 98% 49.76 kg/m2 IUD Never Smoker BMI and BSA Data Body Mass Index Body Surface Area 49.76 kg/m 2 2.49 m 2 Preferred Pharmacy Pharmacy Name Phone [id number]essi [facility] [phone] Your Updated Medication List  
  
   
This list is accurate as of 5/10/18 10:26 AM.  Always use your most recent med list.  
  
  
  
  
 hyoscyamine SL 0.125 mg SL tablet Commonly known as:  LEVSIN/SL  
1 Tab by SubLINGual route every four (4) hours as needed for Cramping.  
  
 loratadine-pseudoephedrine  mg per tablet Commonly known as:  CLARITIN-D 24 HOUR Take 1 Tab by mouth daily. MIRENA 20 mcg/24 hr (5 years) Iud  
Generic drug:  levonorgestrel 1 Each by IntraUTERine route once. Indications: PREGNANCY CONTRACEPTION  
  
 omeprazole 20 mg capsule Commonly known as:  PRILOSEC Take 1 Cap by mouth daily. Indications: PREVENTION OF STRESS ULCER  
  
 ondansetron 4 mg disintegrating tablet Commonly known as:  ZOFRAN ODT Take 1 Tab by mouth every six (6) hours as needed for Nausea. Patient Instructions Constipation: Care Instructions Your Care Instructions Constipation means that you have a hard time passing stools (bowel movements). People pass stools from 3 times a day to once every 3 days. What is normal for you may be different. Constipation may occur with pain in the rectum and cramping. The pain may get worse when you try to pass stools. Sometimes there are small amounts of bright red blood on toilet paper or the surface of stools. This is because of enlarged veins near the rectum (hemorrhoids). A few changes in your diet and lifestyle may help you avoid ongoing constipation. Your doctor may also prescribe medicine to help loosen your stool. Some medicines can cause constipation. These include pain medicines and antidepressants. Tell your doctor about all the medicines you take. Your doctor may want to make a medicine change to ease your symptoms. Follow-up care is a key part of your treatment and safety. Be sure to make and go to all appointments, and call your doctor if you are having problems. It's also a good idea to know your test results and keep a list of the medicines you take. How can you care for yourself at home? · Drink plenty of fluids, enough so that your urine is light yellow or clear like water. If you have kidney, heart, or liver disease and have to limit fluids, talk with your doctor before you increase the amount of fluids you drink. · Include high-fiber foods in your diet each day. These include fruits, vegetables, beans, and whole grains. · Get at least 30 minutes of exercise on most days of the week. Walking is a good choice. You also may want to do other activities, such as running, swimming, cycling, or playing tennis or team sports. · Take a fiber supplement, such as Citrucel or Metamucil, every day. Read and follow all instructions on the label. · Schedule time each day for a bowel movement. A daily routine may help. Take your time having your bowel movement. · Support your feet with a small step stool when you sit on the toilet. This helps flex your hips and places your pelvis in a squatting position. · Your doctor may recommend an over-the-counter laxative to relieve your constipation. Examples are Milk of Magnesia and MiraLax. Read and follow all instructions on the label. Do not use laxatives on a long-term basis. When should you call for help? Call your doctor now or seek immediate medical care if: 
? · You have new or worse belly pain. ? · You have new or worse nausea or vomiting. ? · You have blood in your stools. ? Watch closely for changes in your health, and be sure to contact your doctor if: 
? · Your constipation is getting worse. ? · You do not get better as expected. Where can you learn more? Go to http://han-katia.info/. Enter 21 [phone] in the search box to learn more about \"Constipation: Care Instructions. \" Current as of: March 20, 2017 Content Version: 11.4 © 4701-2665 [employer].  Care instructions adapted under license by [employer] (which disclaims liability or warranty for this information). If you have questions about a medical condition or this instruction, always ask your healthcare professional. [address] any warranty or liability for your use of this information. What you need to know: 1. Advance your diet to soft foods. Follow the handout that you were given today in the office. 2.  Take the recommended vitamins daily 3. No lifting greater than 20 lbs. 4.  You can do light jogging and walking. 5  Follow up in 2 weeks. 6.  You may go into a pool. 7.  If you are not able to tolerate liquids or soft foods. Please call our office. [phone] 
8. If you have vomiting and persistent epigastric pain or chest pain. You should call our office, the doctor on-call or go to the emergency room. What to do if you are constipated: You may  take Milk of Magnesia. Take 2 Tablespoons followed by 16 oz of water then 2 hours later take another 2 tablespoons. If  milk of magnesia does not work then take [place] or Miralax over the counter. Keep in mind that the Benefiber or Miralax may take a day or two to work. If all of the above do not work try a Fleets enema and follow the directions on the box. Soft and Mushy: Phase 1 Below is a list of basic items to purchase for the first phase of the  
soft mushy diet. Your surgeon or nurse practitioner will inform you when it is okay  
to advance to the next phase. Soft and Mushy Foods: Prepare food to the appropriate texture. ? Everything on clear and full liquid diet ? Applesauce (no sugar added) ? Hot & cold cereals (Cream of Wheat, Plain Cheerios®, Special K with protein®, plain oatmeal, grits) ? Frozen or canned vegetables (carrots, acorn squash, butternut squash, string beans, spinach, broccoli, cauliflower  florets only!) ? Canned fruit (in natural juice or with Splenda®) ? Fat-free, cholesterol-free egg substitute (P) ? Low-fat or fat-free cottage cheese (P) ? Low-fat or fat-free yogurt (P) ? Low-fat or fat-free [place] yogurt (P) ? Fat-free milk or 1% milk (P) ? Lactaid fat-free or 1% low fat milk (P) ? Low-fat well-cooked/soft beans (the consistency of refried beans) (P) ? No sugar added, low fat pudding (no pistachio or other flavor containing nuts) ? low-fat cream soups ? Low-fat chicken noodle or chicken rice soup (P) ? Sugar-free fudgesicles ? Sugar-free cocoa ? Fat free whipped or mashed potatoes ? Herbs and spices ? Lite butter, margarine, canola oil, olive oil, reduced-fat or fat-free mayonnaise, reduced-fat or fat-free salad dressing, reduced-fat or fat-free cream cheese, reduced-fat or fat-free sour cream.  
 
 
 P designates food sources of protein. Include a protein at each meal.  
 
If a food does not contain protein, you may want to consider adding protein powder to the food to give it extra protein. For example, mix protein powder in with the following: oatmeal, mashed potatoes, sugar-free pudding, sugar-free gelatin (see recipes in book), no-sugar-added applesauce. Soft Mushy Diet: Phase 1 Time Meal or Snack Soft/Mushy Food Amount (ounces) Protein 
(g) Supplement 6:30 am Sip on Fluids Sip on non-carbonated, calorie-free, no sugar added liquids. 8 oz 
 0 g Take Multivitamin containing 18 mg ferrous sulfate (iron) 7:00 am   Stop drinking fluids 30 minutes before breakfast  
7:30 am Breakfast ½ cup sugar-free oatmeal with 1 scoop of protein powder. Add cinnamon, nutmeg, artificial sweeteners as desired for flavor. 4 oz  
 20-25 g   
9:00 Snack (optional) High Protein Gelatin (see recipe in book) 4oz 10 g   
11:30 am Stop drinking liquids 30 minutes before lunch 12:00 pm Lunch Sip low-fat cream of potato soup or low-fat cream of chicken soup mixed with 1 scoop of protein powder 8 oz soup 25 g Take 400 mg calcium citrate 2:00 Snack (optional) ½ cup high protein pudding (see recipe in book) or  
 ½ cup low-fat cottage cheese or yogurt. Can also add protein powder as needed. 4 oz 14 g 
 
or 
 
5 g   
 
3:00  5:30 pm  
Sip on Fluids Sip on non-carbonated, calorie-free, no sugar added liquids. 24 - 32 oz  
0 g Take 400 mg calcium citrate. 6:00 pm Dinner ¼ cup low-fat, well cooked beans (ex. black beans, low-fat refried beans) ¼ cup no-sugar-added applesauce 4 oz 3.5 g Take 400 mg of calcium citrate. 7:00 - 10:00 pm Sip on Fluids Sip on non-carbonated, no sugar added liquids as needed  16-24 oz 0 g Take Multivitamin with 18 mg ferrous sulfate Total:  80 oz clear fluids 63-77 
grams 2 Multivitamins with 18 mg ferrous sulfate, 0510-1785 mg calcium citrate Introducing hospitals & HEALTH SERVICES! [facility] introduces [employer] patient portal. Now you can access parts of your medical record, email your doctor's office, and request medication refills online. 1. In your internet browser, go to [email or web address] 2. Click on the First Time User? Click Here link in the Sign In box. You will see the New Member Sign Up page. 3. Enter your [employer] Access Code exactly as it appears below. You will not need to use this code after youve completed the sign-up process. If you do not sign up before the expiration date, you must request a new code. · [employer] Access Code: [email or web address] Expires: 7/10/2018  7:51 AM 
 
4. Enter the last four digits of your Social Security Number (xxxx) and Date of Birth (mm/dd/yyyy) as indicated and click Submit. You will be taken to the next sign-up page. 5. Create a [place] [employer] ID. This will be your [employer] login ID and cannot be changed, so think of one that is secure and easy to remember. 6. Create a [employer] password. You can change your password at any time. 7. Enter your Password Reset Question and Answer. This can be used at a later time if you forget your password. 8. Enter your e-mail address.  You will receive e-mail notification when new information is available in pMDsoft. 9. Click Sign Up. You can now view and download portions of your medical record. 10. Click the Download Summary menu link to download a portable copy of your medical information. If you have questions, please visit the Frequently Asked Questions section of the pMDsoft website. Remember, pMDsoft is NOT to be used for urgent needs. For medical emergencies, dial 911. Now available from your iPhone and Android! Please provide this summary of care documentation to your next provider. Your primary care clinician is listed as Glenn Hoffman. If you have any questions after today's visit, please call [phone].

## 2018-05-10 NOTE — PROGRESS NOTES
2 weeks status post Sleeve gastrectomy   Pt reports doing well on liquids . Pt's post op pain is on her right larger incision . Pt reports no nausea and no vomiting  Sheis drinking approximately 64 oz of water daily. She is drinking 40-60 grams of protein daily  + Bm    She is taking all bariatric vitamins without issue. Total weight loss since surgery 13lbs  Weight loss since last visit 13lbs    Visit Vitals    /80 (BP 1 Location: Right arm, BP Patient Position: Sitting)    Pulse 74    Temp 97.9 °F (36.6 °C) (Oral)    Resp 18    Ht 5' 5\" (1.651 m)    Wt 299 lb (135.6 kg)    SpO2 98%    BMI 49.76 kg/m2        Patient has an advanced directive: on file      Ms. Tyrone Zamudio has a reminder for a \"due or due soon\" health maintenance. I have asked that she contact her primary care provider for follow-up on this health maintenance. Physical Examination: General appearance - alert, well appearing, and in no distress,  Chest - clear to auscultation bilaterally  Heart - normal rate, regular rhythm, normal S1, S2, no murmurs, rubs, clicks or gallops  Abdomen - soft, nontender, nondistended  scars from previous incisions healing without erythema or induration    A/P    Doing well 2 weeks  Status post laparoscopic Sleeve gastrectomy  Diet advanced to soft foods. Focus on protein. Goal is 50-60 grams daily. Supplement with unflavored protein powder   Encouraged water intake  Continue vitamins  No lifting greater than 20 lbs  Follow up 2 weeks. RTW  5/24/2018    Pt verbalized understanding and questions were answered to the best of my knowledge and ability.          Jose Antonio Lara NP

## 2018-05-10 NOTE — PATIENT INSTRUCTIONS
Constipation: Care Instructions  Your Care Instructions    Constipation means that you have a hard time passing stools (bowel movements). People pass stools from 3 times a day to once every 3 days. What is normal for you may be different. Constipation may occur with pain in the rectum and cramping. The pain may get worse when you try to pass stools. Sometimes there are small amounts of bright red blood on toilet paper or the surface of stools. This is because of enlarged veins near the rectum (hemorrhoids). A few changes in your diet and lifestyle may help you avoid ongoing constipation. Your doctor may also prescribe medicine to help loosen your stool. Some medicines can cause constipation. These include pain medicines and antidepressants. Tell your doctor about all the medicines you take. Your doctor may want to make a medicine change to ease your symptoms. Follow-up care is a key part of your treatment and safety. Be sure to make and go to all appointments, and call your doctor if you are having problems. It's also a good idea to know your test results and keep a list of the medicines you take. How can you care for yourself at home? · Drink plenty of fluids, enough so that your urine is light yellow or clear like water. If you have kidney, heart, or liver disease and have to limit fluids, talk with your doctor before you increase the amount of fluids you drink. · Include high-fiber foods in your diet each day. These include fruits, vegetables, beans, and whole grains. · Get at least 30 minutes of exercise on most days of the week. Walking is a good choice. You also may want to do other activities, such as running, swimming, cycling, or playing tennis or team sports. · Take a fiber supplement, such as Citrucel or Metamucil, every day. Read and follow all instructions on the label. · Schedule time each day for a bowel movement. A daily routine may help.  Take your time having your bowel movement. · Support your feet with a small step stool when you sit on the toilet. This helps flex your hips and places your pelvis in a squatting position. · Your doctor may recommend an over-the-counter laxative to relieve your constipation. Examples are Milk of Magnesia and MiraLax. Read and follow all instructions on the label. Do not use laxatives on a long-term basis. When should you call for help? Call your doctor now or seek immediate medical care if:  ? · You have new or worse belly pain. ? · You have new or worse nausea or vomiting. ? · You have blood in your stools. ? Watch closely for changes in your health, and be sure to contact your doctor if:  ? · Your constipation is getting worse. ? · You do not get better as expected. Where can you learn more? Go to http://han-katia.info/. Enter 21 [phone] in the search box to learn more about \"Constipation: Care Instructions. \"  Current as of: March 20, 2017  Content Version: 11.4  © 8124-6589 [employer]. Care instructions adapted under license by [employer] (which disclaims liability or warranty for this information). If you have questions about a medical condition or this instruction, always ask your healthcare professional. [address] any warranty or liability for your use of this information. What you need to know:  1. Advance your diet to soft foods. Follow the handout that you were given today in the office. 2.  Take the recommended vitamins daily  3. No lifting greater than 20 lbs. 4.  You can do light jogging and walking. 5  Follow up in 2 weeks. 6.  You may go into a pool. 7.  If you are not able to tolerate liquids or soft foods. Please call our office. [phone]  8. If you have vomiting and persistent epigastric pain or chest pain. You should call our office, the doctor on-call or go to the emergency room. What to do if you are constipated:   You may take Milk of Magnesia. Take 2 Tablespoons followed by 16 oz of water then 2 hours later take another 2 tablespoons. If  milk of magnesia does not work then take [place] or Miralax over the counter. Keep in mind that the Benefiber or Miralax may take a day or two to work. If all of the above do not work try a Fleets enema and follow the directions on the box. Soft and Mushy: Phase 1    Below is a list of basic items to purchase for the first phase of the   soft mushy diet. Your surgeon or nurse practitioner will inform you when it is okay   to advance to the next phase. Soft and Mushy Foods: Prepare food to the appropriate texture. ? Everything on clear and full liquid diet  ? Applesauce (no sugar added)  ? Hot & cold cereals (Cream of Wheat, Plain Cheerios®, Special K with protein®, plain oatmeal, grits)  ? Frozen or canned vegetables (carrots, acorn squash, butternut squash, string beans, spinach, broccoli, cauliflower - florets only!)   ? Canned fruit (in natural juice or with Splenda®)  ? Fat-free, cholesterol-free egg substitute (P)  ? Low-fat or fat-free cottage cheese (P)  ? Low-fat or fat-free yogurt (P)  ? Low-fat or fat-free [place] yogurt (P)  ? Fat-free milk or 1% milk (P)  ? Lactaid fat-free or 1% low fat milk (P)  ? Low-fat well-cooked/soft beans (the consistency of refried beans) (P)  ? No sugar added, low fat pudding (no pistachio or other flavor containing nuts)  ? low-fat cream soups  ? Low-fat chicken noodle or chicken rice soup (P)  ? Sugar-free fudgesicles  ? Sugar-free cocoa  ? Fat free whipped or mashed potatoes   ? Herbs and spices  ? Lite butter, margarine, canola oil, olive oil, reduced-fat or fat-free mayonnaise, reduced-fat or fat-free salad dressing, reduced-fat or fat-free cream cheese, reduced-fat or fat-free sour cream.        P designates food sources of protein.  Include a protein at each meal.     If a food does not contain protein, you may want to consider adding protein powder to the food to give it extra protein. For example, mix protein powder in with the following: oatmeal, mashed potatoes, sugar-free pudding, sugar-free gelatin (see recipes in book), no-sugar-added applesauce. Soft Mushy Diet: Phase 1  Time Meal or Snack Soft/Mushy Food Amount (ounces) Protein  (g) Supplement   6:30 am Sip on Fluids Sip on non-carbonated, calorie-free, no sugar added liquids. 8 oz   0 g Take Multivitamin containing 18 mg ferrous sulfate (iron)    7:00 am   Stop drinking fluids 30 minutes before breakfast   7:30 am Breakfast ½ cup sugar-free oatmeal with 1 scoop of protein powder. Add cinnamon, nutmeg, artificial sweeteners as desired for flavor. 4 oz    20-25 g    9:00 Snack  (optional) High Protein Gelatin (see recipe in book) 4oz 10 g    11:30 am Stop drinking liquids 30 minutes before lunch   12:00 pm Lunch Sip low-fat cream of potato soup or low-fat cream of chicken soup mixed with 1 scoop of protein powder 8 oz soup 25 g Take 400 mg calcium citrate   2:00 Snack  (optional) ½ cup high protein pudding (see recipe in book)   or   ½ cup low-fat cottage cheese or yogurt. Can also add protein powder as needed. 4 oz 14 g    or    5 g      3:00 - 5:30 pm   Sip on Fluids   Sip on non-carbonated, calorie-free, no sugar added liquids. 24 - 32 oz   0 g   Take 400 mg calcium citrate. 6:00 pm Dinner ¼ cup low-fat, well cooked beans (ex. black beans, low-fat refried beans)  ¼ cup no-sugar-added applesauce 4 oz 3.5 g Take 400 mg of calcium citrate.    7:00 - 10:00 pm Sip on Fluids Sip on non-carbonated, no sugar added liquids as needed  16-24 oz 0 g Take Multivitamin with 18 mg ferrous sulfate   Total:  80 oz clear fluids 63-77  grams 2 Multivitamins with 18 mg ferrous sulfate, 0143-8885 mg calcium citrate

## 2018-05-11 NOTE — DISCHARGE SUMMARY
Physician Discharge Summary     Patient ID:  Ritesh Silvestre  [id number]  32 y.o.  1990    Admit Date: 4/26/2018    Discharge Date: 4/28/2018    Admission Diagnoses: MORBID OBESITY ; Morbid obesity with BMI of 50.0-59.9, adult*    Discharge Diagnoses: Active Problems: Morbid obesity with BMI of 50.0-59.9, adult ([address] (7/21/2017)         Admission Condition: Good    Discharge Condition: Good    Procedure(s):    4/26/2018 - Procedure(s):  LAPAROSCOPIC GASTRECTOMY SLEEVE, ENDOSCOPY       Hospital Course: The patient was admitted after sleeve gastrectomy. She was doing well post op but not taking enough PO liquids right after surgery and was then DC homeon POD 2 after she was feeling better and tolerating her liquid diet well    Consults: None    Significant Diagnostic Studies: none    Disposition: home    Patient Instructions:   Cannot display discharge medications since this patient is not currently admitted.       Activity: Activity as tolerated and No heavy lifting for 2 weeks  Diet: bariatric liquid diet  Wound Care: Keep wound clean and dry    Follow-up with Vince Koch MD in 2 week(s)  Follow-up tests/labs none    Signed:  Vince Koch MD  5/11/2018  12:09 PM

## 2018-05-17 ENCOUNTER — TELEPHONE (OUTPATIENT)
Dept: SURGERY | Age: 28
End: 2018-05-17

## 2018-05-17 NOTE — TELEPHONE ENCOUNTER
----- Message from Mina Dumas LPN sent at 9/20/1930 10:49 AM EDT -----  Regarding: 3 wk po call  LAPAROSCOPIC GASTRECTOMY SLEEVE, ENDOSCOPY on 4/26/18   discharged on 4/28/18

## 2018-05-17 NOTE — TELEPHONE ENCOUNTER
Bariatric Post-Operative Phone Calls: Week 3    Diet:Question of any nausea and/or vomiting. Question of tolerance to diet advancement from liquids to solids. Protein intake (goal is 60 grams of protein daily)   Poor____Fair____Good____Great_x___     Comment:_120 grams_____________________________________________________________      ______________________________________________________________________    Hydration:Less than 32 ounces of water daily is fair to poor (Goal is 64 ounces per day)   Poor____ Fair____ Good____Great_x___    Comment:_3 liters_____________________________________________________________    ______________________________________________________________________      Ambulation:( walking at least 3 x week, for at least 30 minutes)   Poor______ Fair______ Good______     Great_x_____ Comment:__________________________________________________    ______________________________________________________________________      Urine Color: Question of any odor and color(should be reggie, pale, and clear) Dark______ Amber______ Pale______      Clear______ Comment:____yellow_______________________________________________                           ________________________________________________________________    Bowel movements: Question of any constipation- haven't had any bowel movements for more than 3 days. This could be related to protein intake and/or narcotic pain medication usage. Comment:                                                                                           3 x a day                                   Pain: Left sided abdominal pain is normal (should be less than 3)         Question if pain medication is helpful.  10___ 9___ 8___ 7___ 6___ 5___ 4___ 3___     2___1___0_x__Comment:_________________________________________________    ______________________________________________________________________      Incision: (No redness, pain, swelling or fever) Healing Well______ Healed__x____Redness_________ Pain_________     Swelling_________ Fever__________(greater than 101 needs evaluation)    Comment:____________________________________________________________    ______________________________________________________________________  Use of incentive spirometer: Yes____       No   x        Next Appointment:_5/24/18_____________                 Support Group: Yes______No______    Additional Comments:____________________________________________________________    ____________________________________________________________________      If more than one parameter is not met or considered poor, nurse needs to discuss with provider recommend for patient to be seen in the office as soon as possible or refer to the provider for follow-up. Reinforce to patient to use bariatric educational booklet as guide. It is appropriate to refer patient to the nutritionist to discuss more in detail of diet and nutrition.

## 2018-05-24 ENCOUNTER — OFFICE VISIT (OUTPATIENT)
Dept: SURGERY | Age: 28
End: 2018-05-24

## 2018-05-24 VITALS
HEIGHT: 65 IN | SYSTOLIC BLOOD PRESSURE: 120 MMHG | BODY MASS INDEX: 48.82 KG/M2 | WEIGHT: 293 LBS | TEMPERATURE: 98.5 F | DIASTOLIC BLOOD PRESSURE: 80 MMHG | HEART RATE: 72 BPM

## 2018-05-24 DIAGNOSIS — Z09 SURGICAL FOLLOW-UP CARE: Primary | ICD-10-CM

## 2018-05-24 DIAGNOSIS — Z98.84 S/P LAPAROSCOPIC SLEEVE GASTRECTOMY: ICD-10-CM

## 2018-05-24 RX ORDER — MULTIVITAMIN WITH IRON
1 TABLET ORAL DAILY
COMMUNITY

## 2018-05-24 NOTE — PROGRESS NOTES
Early Fly is a 29 y.o. female 4 weeks s/p lap sleeve gastrectomy down 17.5 pounds. Weight today is 294.5 pounds. Patient stated doing well and pleased with weight loss. Denies nausea, no vomiting, no heartburn/reflux. Denies dysphagia. No fever/no chills, no shortness of breath, no chest pain, and no abdominal pain. Tolerating all foods on last diet increase, getting 60+ grams of protein daily. Protein supplementation in use with shakes 1-2 times daily. Drinking 60 ounces of water daily. Tolerating all vitamins and medications. Exercising with walking daily. No issues with urination. Bowel movements once daily that are formed. HPI    Review of Systems   Constitutional: Negative for chills, fever and malaise/fatigue. Respiratory: Negative for cough, sputum production and shortness of breath. Cardiovascular: Negative for chest pain, palpitations and leg swelling. Gastrointestinal: Negative for abdominal pain, blood in stool, constipation, diarrhea, heartburn, nausea and vomiting. Genitourinary: Negative for dysuria. Neurological: Negative for dizziness. Physical Exam   Constitutional: She is oriented to person, place, and time. She appears well-developed and well-nourished. No distress. Cardiovascular: Normal rate, regular rhythm and normal heart sounds. Pulmonary/Chest: Effort normal and breath sounds normal. No respiratory distress. She has no wheezes. She has no rales. Abdominal: Soft. Bowel sounds are normal. She exhibits no distension. There is no tenderness. There is no rebound and no guarding. Lap sites dry and intact. Musculoskeletal: Normal range of motion. She exhibits no edema. Neurological: She is alert and oriented to person, place, and time. Skin: Skin is warm and dry. No rash noted. No erythema. Psychiatric: She has a normal mood and affect.  Her behavior is normal. Thought content normal.   Blood pressure 120/80, pulse 72, temperature 98.5 °F (36.9 °C), height 5' 5\" (1.651 m), weight 294 lb 8 oz (133.6 kg). ASSESSMENT and PLAN  Morbid Obesity 4 weeks s/p lap sleeve gastrectomy down 17.5 pounds. Weight today is 294.5 pounds. Patient may advance to soft-textured, low-fat (week 5 and beyond) in educational booklet. Emphasis on 60 grams of protein daily. Still avoid high-fat and added sugar foods and beverages. Measure meals, avoid overeating, chew well, and read food labels. May have protein shake for a meal or snack. Continue hydration with at least 40 ounces of non-calorie, non-carbonated beverages. Continue vitamin regiment daily. Continue walking for physical activity and may incorporate 3 to 5 pound weight training. Follow up in 2 weeks. Patient verbalized understanding and questions were answered to the best of my knowledge and ability. Diet advancement educational materials were provided. Advised to call office if any questions/or concerns.

## 2018-05-24 NOTE — PROGRESS NOTES
1. Have you been to the ER, urgent care clinic since your last visit? Hospitalized since your last visit? No    2. Have you seen or consulted any other health care providers outside of the [facility] since your last visit? Include any pap smears or colon screening.  No

## 2018-05-24 NOTE — MR AVS SNAPSHOT
[address] [facility] [address] [facility] [address] [id number] [phone] Patient: Lui Felix MRN: [id number] VST:3/96/3147 Visit Information Date & Time Provider Department Dept. Phone Encounter #  
 5/24/2018 10:20 AM Jessica Peralta NP [facility] 22 294 [phone] [id number] Follow-up Instructions Return in about 2 weeks (around 6/7/2018). Your Appointments 6/7/2018 10:00 AM  
POST OP 10 MIN with Jessica Peralta NP  
[facility] 22 543 ([facility]) Appt Note: 3 po 6 week f/u  
 [address] [facility] [address] [facility] [id number]  
[address] [place] [address] Upcoming Health Maintenance Date Due DTaP/Tdap/Td series (1 - Tdap) 5/17/2011 PAP AKA CERVICAL CYTOLOGY 5/17/2011 Influenza Age 5 to Adult 8/1/2018 Allergies as of 5/24/2018  Review Complete On: 5/24/2018 By: Willeen Galeazzi, LPN No Known Allergies Current Immunizations  Never Reviewed No immunizations on file. Not reviewed this visit You Were Diagnosed With   
  
 Codes Comments Surgical follow-up care    -  Primary ICD-10-CM: U26 ICD-9-CM: V67.00 S/P laparoscopic sleeve gastrectomy     ICD-10-CM: S42.38 ICD-9-CM: V45.86 BMI 45.0-49.9, adult [facility])     ICD-10-CM: J52.22 
ICD-9-CM: V85.42 Vitals BP Pulse Temp Height(growth percentile) Weight(growth percentile) BMI  
 120/80 72 98.5 °F (36.9 °C) 5' 5\" (1.651 m) 294 lb 8 oz (133.6 kg) 49.01 kg/m2 OB Status Smoking Status IUD Never Smoker Vitals History BMI and BSA Data Body Mass Index Body Surface Area 49.01 kg/m 2 2.48 m 2 Preferred Pharmacy Pharmacy Name Phone [id number] [address], [facility] [phone] Your Updated Medication List  
  
   
 This list is accurate as of 5/24/18 10:46 AM.  Always use your most recent med list.  
  
  
  
  
 CALCIUM CITRATE + PO Take  by mouth. Ferrous Sulfate 27 mg iron Tab Take  by mouth. hyoscyamine SL 0.125 mg SL tablet Commonly known as:  LEVSIN/SL  
1 Tab by SubLINGual route every four (4) hours as needed for Cramping.  
  
 loratadine-pseudoephedrine  mg per tablet Commonly known as:  CLARITIN-D 24 HOUR Take 1 Tab by mouth daily. MIRENA 20 mcg/24 hr (5 years) Iud  
Generic drug:  levonorgestrel 1 Each by IntraUTERine route once. Indications: PREGNANCY CONTRACEPTION  
  
 multivitamin with iron tablet Take 1 Tab by mouth daily. NASCOBAL 500 mcg/spray Spry Generic drug:  cyanocobalamin  
by Nasal route. omeprazole 20 mg capsule Commonly known as:  PRILOSEC Take 1 Cap by mouth daily. Indications: PREVENTION OF STRESS ULCER  
  
 ondansetron 4 mg disintegrating tablet Commonly known as:  ZOFRAN ODT Take 1 Tab by mouth every six (6) hours as needed for Nausea. Follow-up Instructions Return in about 2 weeks (around 6/7/2018). Patient Instructions What you need to know: 
1 . Advance your diet to moist meats. Follow the handout that you were given today in the office. 2. Take the recommended vitamins daily 3 No lifting greater than 40 lbs. 4. You can do light jogging, moderate walking and a recumbent bike. 5 Follow up in 2 weeks. 6. You may go into a pool. 7. If you are not able to tolerate liquids, soft foods or moist meats. Please call our office. [phone] 
8. If you have vomiting and persistent epigastric pain or chest pain. You should call our office, the doctor on-call or go to the emergency room. What to do if you are constipated: You may  take Milk of Magnesia. Take 2 Tablespoons followed by 16 oz of water then 2 hours later take another 2 tablespoons.  If  milk of magnesia does not work then take [place] or Miralax over the counter. Keep in mind that the Benefiber or Miralax may take a day or two to work. If all of the above do not work try a Fleets enema and follow the directions on the box. Shopping List Rosetta Albia Soft Mushy Diet: Phase 2 - Moist Meats Below is a list of moist meats that you can now introduce into your diet. Moist Meats: Prepare food to the appropriate texture using low-fat cooking  
methods ? Tuna packed in water (strain before eating) ? White flaky fish (michelle, cod, flounder, tilapia, salmon) ? White chicken breast packed in water (strain before eating) ? 96-99% fat free thinly sliced deli meat (ham, turkey, roast beef) ? Fat free non-stick spray ? Silken Tofu ? Low-fat or vegetarian refried beans ? Well-cooked beans and lentils ? Skinless turkey or chicken (prepare to a soft texture) ? 93% lean pureed beef (round or sirloin only) ? Lean pork (cooked until very tender, cut into small pieces) ? Eggs (preferable egg whites) ? Egg substitutes ? Herbs and spices ? Lite butter, margarine, canola oil, olive oil, reduced-fat or fat-free wood, reduced-fat or fat-free salad dressing, reduced-fat or fat-free cream cheese, reduced-fat or fat-free sour cream, lemon juice, salt, pepper, mustard, ketchup, salsa. See patient handbook for more low-fat cooking ideas. ? Be sure to use moist methods of cooking. Avoid microwaving meats because it can dry out the food making it harder to tolerate. Introducing [facility]! [employer] introduces [employer] patient portal. Now you can access parts of your medical record, email your doctor's office, and request medication refills online. 1. In your internet browser, go to [email or web address] 2. Click on the First Time User? Click Here link in the Sign In box. You will see the New Member Sign Up page. 3. Enter your [employer] Access Code exactly as it appears below. You will not need to use this code after youve completed the sign-up process. If you do not sign up before the expiration date, you must request a new code. · [employer] Access Code: [email or web address] Expires: 7/10/2018  7:51 AM 
 
4. Enter the last four digits of your Social Security Number (xxxx) and Date of Birth (mm/dd/yyyy) as indicated and click Submit. You will be taken to the next sign-up page. 5. Create a [employer] ID. This will be your [employer] login ID and cannot be changed, so think of one that is secure and easy to remember. 6. Create a [employer] password. You can change your password at any time. 7. Enter your Password Reset Question and Answer. This can be used at a later time if you forget your password. 8. Enter your e-mail address. You will receive e-mail notification when new information is available in [address]. 9. Click Sign Up. You can now view and download portions of your medical record. 10. Click the Download Summary menu link to download a portable copy of your medical information. If you have questions, please visit the Frequently Asked Questions section of the [employer] website. Remember, [employer] is NOT to be used for urgent needs. For medical emergencies, dial 911. Now available from your iPhone and Android! Please provide this summary of care documentation to your next provider. Your primary care clinician is listed as Glenn Hoffman. If you have any questions after today's visit, please call [phone].

## 2018-05-24 NOTE — PATIENT INSTRUCTIONS
What you need to know:  1 . Advance your diet to moist meats. Follow the handout that you were given today in the office. 2. Take the recommended vitamins daily  3 No lifting greater than 40 lbs. 4. You can do light jogging, moderate walking and a recumbent bike. 5 Follow up in 2 weeks. 6. You may go into a pool. 7. If you are not able to tolerate liquids, soft foods or moist meats. Please call our office. [phone]  8. If you have vomiting and persistent epigastric pain or chest pain. You should call our office, the doctor on-call or go to the emergency room. What to do if you are constipated: You may  take Milk of Magnesia. Take 2 Tablespoons followed by 16 oz of water then 2 hours later take another 2 tablespoons. If  milk of magnesia does not work then take [place] or Miralax over the counter. Keep in mind that the Benefiber or Miralax may take a day or two to work. If all of the above do not work try a Fleets enema and follow the directions on the box. Shopping List [employer]     Soft Mushy Diet: Phase 2 - Moist Meats     Below is a list of moist meats that you can now introduce into your diet. Moist Meats: Prepare food to the appropriate texture using low-fat cooking   methods       ? Tuna packed in water (strain before eating)  ? White flaky fish (michelle, cod, flounder, tilapia, salmon)   ? White chicken breast packed in water (strain before eating)  ? 96-99% fat free thinly sliced deli meat (ham, turkey, roast beef)  ? Fat free non-stick spray  ? Silken Tofu  ? Low-fat or vegetarian refried beans  ? Well-cooked beans and lentils  ? Skinless turkey or chicken (prepare to a soft texture)  ? 93% lean pureed beef (round or sirloin only)  ? Lean pork (cooked until very tender, cut into small pieces)  ? Eggs (preferable egg whites)  ? Egg substitutes  ? Herbs and spices  ?  Lite butter, margarine, canola oil, olive oil, reduced-fat or fat-free wood, reduced-fat or fat-free salad dressing, reduced-fat or fat-free cream cheese, reduced-fat or fat-free sour cream, lemon juice, salt, pepper, mustard, ketchup, salsa. See patient handbook for more low-fat cooking ideas. ? Be sure to use moist methods of cooking. Avoid microwaving meats because it can dry out the food making it harder to tolerate.

## 2018-05-30 ENCOUNTER — OFFICE VISIT (OUTPATIENT)
Dept: SURGERY | Age: 28
End: 2018-05-30

## 2018-05-30 ENCOUNTER — TELEPHONE (OUTPATIENT)
Dept: SURGERY | Age: 28
End: 2018-05-30

## 2018-05-30 VITALS
SYSTOLIC BLOOD PRESSURE: 140 MMHG | WEIGHT: 293 LBS | BODY MASS INDEX: 48.82 KG/M2 | TEMPERATURE: 98.5 F | HEIGHT: 65 IN | OXYGEN SATURATION: 92 % | DIASTOLIC BLOOD PRESSURE: 80 MMHG | HEART RATE: 83 BPM | RESPIRATION RATE: 20 BRPM

## 2018-05-30 DIAGNOSIS — R10.31 RIGHT LOWER QUADRANT ABDOMINAL PAIN: ICD-10-CM

## 2018-05-30 DIAGNOSIS — Z98.84 S/P LAPAROSCOPIC SLEEVE GASTRECTOMY: ICD-10-CM

## 2018-05-30 DIAGNOSIS — Z09 SURGICAL FOLLOW-UP CARE: Primary | ICD-10-CM

## 2018-05-30 NOTE — PATIENT INSTRUCTIONS
May use Levsin (Hycosamine) as needed for pain  Continue with hydration. Add G2 Gatorade daily. Keep follow up appointment for next week. Abdominal Pain: Care Instructions  Your Care Instructions    Abdominal pain has many possible causes. Some aren't serious and get better on their own in a few days. Others need more testing and treatment. If your pain continues or gets worse, you need to be rechecked and may need more tests to find out what is wrong. You may need surgery to correct the problem. Don't ignore new symptoms, such as fever, nausea and vomiting, urination problems, pain that gets worse, and dizziness. These may be signs of a more serious problem. Your doctor may have recommended a follow-up visit in the next 8 to 12 hours. If you are not getting better, you may need more tests or treatment. The doctor has checked you carefully, but problems can develop later. If you notice any problems or new symptoms, get medical treatment right away. Follow-up care is a key part of your treatment and safety. Be sure to make and go to all appointments, and call your doctor if you are having problems. It's also a good idea to know your test results and keep a list of the medicines you take. How can you care for yourself at home? · Rest until you feel better. · To prevent dehydration, drink plenty of fluids, enough so that your urine is light yellow or clear like water. Choose water and other caffeine-free clear liquids until you feel better. If you have kidney, heart, or liver disease and have to limit fluids, talk with your doctor before you increase the amount of fluids you drink. · If your stomach is upset, eat mild foods, such as rice, dry toast or crackers, bananas, and applesauce. Try eating several small meals instead of two or three large ones. · Wait until 48 hours after all symptoms have gone away before you have spicy foods, alcohol, and drinks that contain caffeine.   · Do not eat foods that are high in fat. · Avoid anti-inflammatory medicines such as aspirin, ibuprofen (Advil, Motrin), and naproxen (Aleve). These can cause stomach upset. Talk to your doctor if you take daily aspirin for another health problem. When should you call for help? Call 911 anytime you think you may need emergency care. For example, call if:  ? · You passed out (lost consciousness). ? · You pass maroon or very bloody stools. ? · You vomit blood or what looks like coffee grounds. ? · You have new, severe belly pain. ?Call your doctor now or seek immediate medical care if:  ? · Your pain gets worse, especially if it becomes focused in one area of your belly. ? · You have a new or higher fever. ? · Your stools are black and look like tar, or they have streaks of blood. ? · You have unexpected vaginal bleeding. ? · You have symptoms of a urinary tract infection. These may include:  ¨ Pain when you urinate. ¨ Urinating more often than usual.  ¨ Blood in your urine. ? · You are dizzy or lightheaded, or you feel like you may faint. ? Watch closely for changes in your health, and be sure to contact your doctor if:  ? · You are not getting better after 1 day (24 hours). Where can you learn more? Go to http://han-katia.info/. Enter V644 in the search box to learn more about \"Abdominal Pain: Care Instructions. \"  Current as of: March 20, 2017  Content Version: 11.4  © 9024-9554 [employer]. Care instructions adapted under license by [employer] (which disclaims liability or warranty for this information). If you have questions about a medical condition or this instruction, always ask your healthcare professional. [address] any warranty or liability for your use of this information.

## 2018-05-30 NOTE — TELEPHONE ENCOUNTER
Made patient aware she needs to keep her appointment today with RACHEL Rivera. She is complaining of pain and being lightheaded.

## 2018-05-30 NOTE — PROGRESS NOTES
1. Have you been to the ER, urgent care clinic since your last visit? Hospitalized since your last visit? No    2. Have you seen or consulted any other health care providers outside of the [address] [place] [address] since your last visit? Include any pap smears or colon screening.  No

## 2018-05-30 NOTE — LETTER
NOTIFICATION OF RETURN TO WORK  
 
5/30/2018 2:33 PM 
 
[facility] [address] [facility] [phone] [address] To Whom It May Concern: 
 
Bob Beth was under the care of [address] [place] [address] from April 26, 2018 to present. She excuse patient from work. Patient was seen in office today, May 30, 2018. Patient will return to work on June 4, 2018 If there are questions or concerns please have the patient contact our office.  
 
Sincerely, 
 
 
 
Rodrigo Mark NP

## 2018-05-30 NOTE — MR AVS SNAPSHOT
[address] [facility] [place] [id number] [phone] Patient: Byron Neal MRN: [id number] [id number] Visit Information Date & Time Provider Department Dept. Phone Encounter #  
 5/30/2018  2:00 PM Willa Beavers NP [facility] 22 [phone] [id number] Your Appointments 6/7/2018 10:00 AM  
POST OP 10 MIN with Willa Beavers NP  
[facility] 22 [phone] ([facility] Appt Note: 3 po 6 week f/u  
 [address] [facility] [phone]  
[address] [place] [address] Upcoming Health Maintenance Date Due DTaP/Tdap/Td series (1 - Tdap) 5/17/2011 PAP AKA CERVICAL CYTOLOGY 5/17/2011 Influenza Age 5 to Adult 8/1/2018 Allergies as of 5/30/2018  Review Complete On: 5/30/2018 By: Sue Maciel LPN No Known Allergies Current Immunizations  Never Reviewed No immunizations on file. Not reviewed this visit Vitals BP Pulse Temp Resp Height(growth percentile) Weight(growth percentile) 140/80 83 98.5 °F (36.9 °C) 20 5' 5\" (1.651 m) 293 lb (132.9 kg) SpO2 BMI OB Status Smoking Status 92% 48.76 kg/m2 IUD Never Smoker Vitals History BMI and BSA Data Body Mass Index Body Surface Area 48.76 kg/m 2 2.47 m 2 Preferred Pharmacy Pharmacy Name Phone [id number] [place] [address] [phone] Your Updated Medication List  
  
   
This list is accurate as of 5/30/18  2:36 PM.  Always use your most recent med list.  
  
  
  
  
 CALCIUM CITRATE + PO Take  by mouth. Ferrous Sulfate 27 mg iron Tab Take  by mouth. hyoscyamine SL 0.125 mg SL tablet Commonly known as:  LEVSIN/SL  
1 Tab by SubLINGual route every four (4) hours as needed for Cramping. loratadine-pseudoephedrine  mg per tablet Commonly known as:  CLARITIN-D 24 HOUR Take 1 Tab by mouth daily. MIRENA 20 mcg/24 hr (5 years) Iud  
Generic drug:  levonorgestrel 1 Each by IntraUTERine route once. Indications: PREGNANCY CONTRACEPTION  
  
 multivitamin with iron tablet Take 1 Tab by mouth daily. NASCOBAL 500 mcg/spray Spry Generic drug:  cyanocobalamin  
by Nasal route. omeprazole 20 mg capsule Commonly known as:  PRILOSEC Take 1 Cap by mouth daily. Indications: PREVENTION OF STRESS ULCER  
  
 ondansetron 4 mg disintegrating tablet Commonly known as:  ZOFRAN ODT Take 1 Tab by mouth every six (6) hours as needed for Nausea. Patient Instructions May use Levsin (Hycosamine) as needed for pain 
Continue with hydration. Add G2 Gatorade daily. Keep follow up appointment for next week. Abdominal Pain: Care Instructions Your Care Instructions Abdominal pain has many possible causes. Some aren't serious and get better on their own in a few days. Others need more testing and treatment. If your pain continues or gets worse, you need to be rechecked and may need more tests to find out what is wrong. You may need surgery to correct the problem. Don't ignore new symptoms, such as fever, nausea and vomiting, urination problems, pain that gets worse, and dizziness. These may be signs of a more serious problem. Your doctor may have recommended a follow-up visit in the next 8 to 12 hours. If you are not getting better, you may need more tests or treatment. The doctor has checked you carefully, but problems can develop later. If you notice any problems or new symptoms, get medical treatment right away. Follow-up care is a key part of your treatment and safety. Be sure to make and go to all appointments, and call your doctor if you are having problems. It's also a good idea to know your test results and keep a list of the medicines you take. How can you care for yourself at home? · Rest until you feel better. · To prevent dehydration, drink plenty of fluids, enough so that your urine is light yellow or clear like water. Choose water and other caffeine-free clear liquids until you feel better. If you have kidney, heart, or liver disease and have to limit fluids, talk with your doctor before you increase the amount of fluids you drink. · If your stomach is upset, eat mild foods, such as rice, dry toast or crackers, bananas, and applesauce. Try eating several small meals instead of two or three large ones. · Wait until 48 hours after all symptoms have gone away before you have spicy foods, alcohol, and drinks that contain caffeine. · Do not eat foods that are high in fat. · Avoid anti-inflammatory medicines such as aspirin, ibuprofen (Advil, Motrin), and naproxen (Aleve). These can cause stomach upset. Talk to your doctor if you take daily aspirin for another health problem. When should you call for help? Call 911 anytime you think you may need emergency care. For example, call if: 
? · You passed out (lost consciousness). ? · You pass maroon or very bloody stools. ? · You vomit blood or what looks like coffee grounds. ? · You have new, severe belly pain. ?Call your doctor now or seek immediate medical care if: 
? · Your pain gets worse, especially if it becomes focused in one area of your belly. ? · You have a new or higher fever. ? · Your stools are black and look like tar, or they have streaks of blood. ? · You have unexpected vaginal bleeding. ? · You have symptoms of a urinary tract infection. These may include: 
¨ Pain when you urinate. ¨ Urinating more often than usual. 
¨ Blood in your urine. ? · You are dizzy or lightheaded, or you feel like you may faint. ? Watch closely for changes in your health, and be sure to contact your doctor if: 
? · You are not getting better after 1 day (24 hours). Where can you learn more? Go to http://han-katia.info/. Enter K581 in the search box to learn more about \"Abdominal Pain: Care Instructions. \" Current as of: March 20, 2017 Content Version: 11.4 © 2049-0042 Healthwise, Incorporated. Care instructions adapted under license by [employer] (which disclaims liability or warranty for this information). If you have questions about a medical condition or this instruction, always ask your healthcare professional. [address] any warranty or liability for your use of this information. Introducing [facility] & HEALTH SERVICES! Olga Mohr introduces [employer] patient portal. Now you can access parts of your medical record, email your doctor's office, and request medication refills online. 1. In your internet browser, go to [email or web address] ([email or web address]). [email or web address] (Member[employer]) 2. Click on the First Time User? Click Here link in the Sign In box. You will see the New Member Sign Up page. 3. Enter your Student[employer] Access Code exactly as it appears below. You will not need to use this code after youve completed the sign-up process. If you do not sign up before the expiration date, you must request a new code. · Student[employer] Access Code: [email or web address] Expires: 7/10/2018  7:51 AM 
 
4. Enter the last four digits of your Social Security Number (xxxx) and Date of Birth (mm/dd/yyyy) as indicated and click Submit. You will be taken to the next sign-up page. 5. Create a StudentFunder ID. This will be your Student[employer] login ID and cannot be changed, so think of one that is secure and easy to remember. 6. Create a Student[employer]under password. You can change your password at any time. 7. Enter your Password Reset Question and Answer. This can be used at a later time if you forget your password. 8. Enter your e-mail address. You will receive e-mail notification when new information is available in [address] 9. Click Sign Up. You can now view and download portions of your medical record. 10. Click the Download Summary menu link to download a portable copy of your medical information. If you have questions, please visit the Frequently Asked Questions section of the [employer] website. Remember, [employer] is NOT to be used for urgent needs. For medical emergencies, dial 911. Now available from your iPhone and Android! Please provide this summary of care documentation to your next provider. Your primary care clinician is listed as Glenn Hoffman. If you have any questions after today's visit, please call [phone].

## 2018-05-31 NOTE — PROGRESS NOTES
Byron Neal is a 29 y.o. female5 weeks s/p lap sleeve gastrectomy down 19 pounds. Weight today is 293 pounds. Patient comes in office with complaint of abdominal pain. \" I think I went back to work too soon. \" Since returning to work last week, having more episodes of abdominal pain and cramping. Stated pain occurs with movement and unrelated to eating. Patient works for [employer], so she is lifting and bending over frequently. Denies nausea, no vomiting, no heartburn/reflux. Denies dysphagia. No fever/no chills, no shortness of breath, no chest pain, and no abdominal pain. Tolerating all foods on diet increase. Protein shake in use daily. Drinking 50 ounces of water daily. Tolerating all vitamins and medications. Exercising with walking. No issues with urination. Bowel movements once daily that are formed. HPI    Review of Systems   Constitutional: Negative for fever and malaise/fatigue. Respiratory: Negative for cough, sputum production and shortness of breath. Cardiovascular: Negative for chest pain, palpitations and leg swelling. Gastrointestinal: Positive for abdominal pain. Negative for blood in stool, constipation, diarrhea, heartburn, nausea and vomiting. Genitourinary: Negative for dysuria. Neurological: Negative for dizziness and weakness. Physical Exam   Constitutional: She is oriented to person, place, and time. She appears well-developed and well-nourished. No distress. Cardiovascular: Normal rate, regular rhythm and normal heart sounds. Pulmonary/Chest: Effort normal and breath sounds normal. No respiratory distress. She has no wheezes. She has no rales. Abdominal: Soft. Bowel sounds are normal. She exhibits no distension. There is tenderness. There is no rebound and no guarding. Lap sites dry and intact. Complaint of tenderness with deep palpation   Musculoskeletal: Normal range of motion. She exhibits no edema.    Neurological: She is alert and oriented to person, place, and time. Skin: Skin is warm and dry. No rash noted. No erythema. Psychiatric: She has a normal mood and affect. Her behavior is normal. Thought content normal.   Blood pressure 140/80, pulse 83, temperature 98.5 °F (36.9 °C), resp. rate 20, height 5' 5\" (1.651 m), weight 293 lb (132.9 kg), SpO2 92 %. ASSESSMENT and PLAN  Morbid Obesity 5 weeks s/p lap sleeve gastrectomy down 19 pounds. Weight today is 293 pounds. Abdominal pain    Advised patient to continue with lifting restrictions. Advised patient may use over the counter Tylenol as needed for pain. Also may use Levsin as prescribed as needed for pain. Also may use warm compress to abdomen for discomfort as needed. Continue with current nutrition, hydration, and bariatric vitamin regime. Patient follow up next week. Work note given. Patient  verbalized understanding and questions were answered to the best of my knowledge and ability. Abdominal pain educational materials were provided. Advised if any questions or concerns to call the office.

## 2018-06-07 ENCOUNTER — OFFICE VISIT (OUTPATIENT)
Dept: SURGERY | Age: 28
End: 2018-06-07

## 2018-06-07 VITALS
TEMPERATURE: 98.3 F | HEART RATE: 84 BPM | RESPIRATION RATE: 20 BRPM | HEIGHT: 65 IN | BODY MASS INDEX: 48.82 KG/M2 | SYSTOLIC BLOOD PRESSURE: 111 MMHG | WEIGHT: 293 LBS | DIASTOLIC BLOOD PRESSURE: 75 MMHG | OXYGEN SATURATION: 98 %

## 2018-06-07 DIAGNOSIS — Z98.84 S/P LAPAROSCOPIC SLEEVE GASTRECTOMY: ICD-10-CM

## 2018-06-07 DIAGNOSIS — Z09 SURGICAL FOLLOW-UP CARE: Primary | ICD-10-CM

## 2018-06-07 NOTE — LETTER
NOTIFICATION OF RETURN TO WORK / SCHOOL 
 
6/7/2018 10:14 AM 
 
Ms. Nydia العراقي [address] [place] [address] [phone] Alannah Hernandez To Whom It May Concern: 
 
Nydia العراقي was under the care of [facility] [address]. She was seen in our office today. She will be able to return to work on 6/8/18 with no restrictions. If there are questions or concerns please have the patient contact our office.  
 
Sincerely, 
 
 
Krystina Fitzpatrick NP

## 2018-06-07 NOTE — LETTER
NOTIFICATION OF RETURN TO WORK / SCHOOL 
 
6/7/2018 10:17 AM 
 
Ms. Chris [address] [address] Jesse Gibson To Whom It May Concern: 
 
Chris Morris was under the care of [facility] [phone]. She was seen in our office today If there are questions or concerns please have the patient contact our office.  
 
Sincerely, 
 
 
Chandana Munoz, NP

## 2018-06-07 NOTE — PATIENT INSTRUCTIONS
What you need to know:  1 . Advance your diet to solid textured foods. 2. Take the recommended vitamins daily  3. You may return to your normal lifting   4. You can jog, walk, run, use and elliptical.   5 Follow up in 6 weeks. 6. You may go into a pool. 7. If you are not able to tolerate liquids, soft foods, moist meats or solid foods   Please call our office. 8. If you have vomiting and persistent epigastric pain or chest pain. You should call our office  [phone]) , the doctor on-call or go to the emergency room. What to do if you are constipated: You may  take Milk of Magnesia. Take 2 Tablespoons followed by 16 oz of water then 2 hours later take another 2 tablespoons. If  milk of magnesia does not work then take [place] or Miralax over the counter. Keep in mind that the Benefiber or Miralax may take a day or two to work. If all of the above do not work try a Fleets enema and follow the directions on the box.

## 2018-06-07 NOTE — MR AVS SNAPSHOT
[address] [place] [phone] Patient: Bob Beth MRN: [id number] DRV:9/[id number] Visit Information Date & Time Provider Department Dept. Phone Encounter #  
 6/7/2018 10:00 AM Rodrigo Mark NP [place] [phone] [id number] Follow-up Instructions Return in about 6 weeks (around 7/19/2018). Upcoming Health Maintenance Date Due DTaP/Tdap/Td series (1 - Tdap) 5/17/2011 PAP AKA CERVICAL CYTOLOGY 5/17/2011 Influenza Age 5 to Adult 8/1/2018 Allergies as of 6/7/2018  Review Complete On: 6/7/2018 By: Rodrigo Mark NP No Known Allergies Current Immunizations  Never Reviewed No immunizations on file. Not reviewed this visit You Were Diagnosed With   
  
 Codes Comments Surgical follow-up care    -  Primary ICD-10-CM: X21 ICD-9-CM: V67.00 S/P laparoscopic sleeve gastrectomy     ICD-10-CM: H34.15 ICD-9-CM: V45.86 BMI 45.0-49.9, adult [facility])     ICD-10-CM: C18.48 
ICD-9-CM: V85.42 Vitals BP Pulse Temp Resp Height(growth percentile) Weight(growth percentile) 111/75 (BP 1 Location: Left arm, BP Patient Position: Sitting) 84 98.3 °F (36.8 °C) (Oral) 20 5' 5\" (1.651 m) 295 lb (133.8 kg) SpO2 BMI OB Status Smoking Status 98% 49.09 kg/m2 IUD Never Smoker Vitals History BMI and BSA Data Body Mass Index Body Surface Area 49.09 kg/m 2 2.48 m 2 Preferred Pharmacy Pharmacy Name Phone [id number] [place] [facility] [phone] Your Updated Medication List  
  
   
This list is accurate as of 6/7/18 10:17 AM.  Always use your most recent med list.  
  
  
  
  
 CALCIUM CITRATE + PO Take  by mouth. Ferrous Sulfate 27 mg iron Tab Take  by mouth. hyoscyamine SL 0.125 mg SL tablet Commonly known as:  LEVSIN/SL  
 1 Tab by SubLINGual route every four (4) hours as needed for Cramping.  
  
 loratadine-pseudoephedrine  mg per tablet Commonly known as:  CLARITIN-D 24 HOUR Take 1 Tab by mouth daily. MIRENA 20 mcg/24 hr (5 years) Iud  
Generic drug:  levonorgestrel 1 Each by IntraUTERine route once. Indications: PREGNANCY CONTRACEPTION  
  
 multivitamin with iron tablet Take 1 Tab by mouth daily. NASCOBAL 500 mcg/spray Spry Generic drug:  cyanocobalamin  
by Nasal route. omeprazole 20 mg capsule Commonly known as:  PRILOSEC Take 1 Cap by mouth daily. Indications: PREVENTION OF STRESS ULCER  
  
 ondansetron 4 mg disintegrating tablet Commonly known as:  ZOFRAN ODT Take 1 Tab by mouth every six (6) hours as needed for Nausea. Follow-up Instructions Return in about 6 weeks (around 7/19/2018). Patient Instructions What you need to know: 
1 . Advance your diet to solid textured foods. 2. Take the recommended vitamins daily 3. You may return to your normal lifting 4. You can jog, walk, run, use and elliptical.  
5 Follow up in 6 weeks. 6. You may go into a pool. 7. If you are not able to tolerate liquids, soft foods, moist meats or solid foods   Please call our office. 8. If you have vomiting and persistent epigastric pain or chest pain. You should call our office  [phone]) , the doctor on-call or go to the emergency room. What to do if you are constipated: You may  take Milk of Magnesia. Take 2 Tablespoons followed by 16 oz of water then 2 hours later take another 2 tablespoons. If  milk of magnesia does not work then take [place] or Miralax over the counter. Keep in mind that the Benefiber or Miralax may take a day or two to work. If all of the above do not work try a Fleets enema and follow the directions on the box. Introducing [facility] & HEALTH SERVICES!    
 Kassie Terrazas introduces [employer] patient portal. Now you can access parts of your medical record, email your doctor's office, and request medication refills online. 1. In your internet browser, go to [email or web address] 2. Click on the First Time User? Click Here link in the Sign In box. You will see the New Member Sign Up page. 3. Enter your [email or web address] Access Code exactly as it appears below. You will not need to use this code after youve completed the sign-up process. If you do not sign up before the expiration date, you must request a new code. · [email or web address] Access Code: [email or web address] Expires: 7/10/2018  7:51 AM 
 
4. Enter the last four digits of your Social Security Number (xxxx) and Date of Birth (mm/dd/yyyy) as indicated and click Submit. You will be taken to the next sign-up page. 5. Create a [email or web address] ID. This will be your [email or web address] login ID and cannot be changed, so think of one that is secure and easy to remember. 6. Create a [email or web address] password. You can change your password at any time. 7. Enter your Password Reset Question and Answer. This can be used at a later time if you forget your password. 8. Enter your e-mail address. You will receive e-mail notification when new information is available in [address] 9. Click Sign Up. You can now view and download portions of your medical record. 10. Click the Download Summary menu link to download a portable copy of your medical information. If you have questions, please visit the Frequently Asked Questions section of the [email or web address] website. Remember, [email or web address] is NOT to be used for urgent needs. For medical emergencies, dial 911. Now available from your iPhone and Android! Please provide this summary of care documentation to your next provider. Your primary care clinician is listed as Glenn Hoffman. If you have any questions after today's visit, please call [phone].

## 2018-06-07 NOTE — PROGRESS NOTES
Monique Berrios is a 29 y.o. female 6 weeks s/p sleeve gastrectomy, down 17 pounds. Weight today is 295 pounds. Patient stated doing a lot better since last office appointment. Denies nausea, no vomiting, no heartburn/reflux. Denies dysphagia. No fever/no chills, no shortness of breath, no chest pain, and no abdominal pain. Tolerating all foods on last diet increase, getting 50-60 grams of protein daily. Protein supplementation in use intermittently. Eating a lot of seafood. Drinking 65 ounces of water and Powerade daily. Tolerating all vitamins and medications. Exercising with walking. No issues with urination. Bowel movements once daily that are formed. HPI    Review of Systems   Constitutional: Negative for chills, fever and malaise/fatigue. Respiratory: Negative for cough, sputum production and shortness of breath. Cardiovascular: Negative for chest pain, palpitations and leg swelling. Gastrointestinal: Negative for abdominal pain, blood in stool, constipation, diarrhea, heartburn, nausea and vomiting. Genitourinary: Negative for dysuria. Musculoskeletal: Negative for back pain. Neurological: Negative for dizziness. Physical Exam   Constitutional: She is oriented to person, place, and time. She appears well-developed and well-nourished. No distress. Cardiovascular: Normal rate, regular rhythm and normal heart sounds. Pulmonary/Chest: Effort normal and breath sounds normal. No respiratory distress. She has no wheezes. She has no rales. Abdominal: Soft. Bowel sounds are normal. She exhibits no distension. There is no tenderness. There is no rebound and no guarding. Lap sites dry and intact   Musculoskeletal: Normal range of motion. She exhibits no edema. Neurological: She is alert and oriented to person, place, and time. Skin: Skin is warm and dry. No rash noted. No erythema. Psychiatric: She has a normal mood and affect.  Her behavior is normal. Thought content normal.   Blood pressure 111/75, pulse 84, temperature 98.3 °F (36.8 °C), temperature source Oral, resp. rate 20, height 5' 5\" (1.651 m), weight 295 lb (133.8 kg), SpO2 98 %. ASSESSMENT and PLAN  Morbid Obesity 6 weeks s/p sleeve gastrectomy, down 17 pounds. Weight today is 295 pounds. Patient may advance to \"regular diet\", that is outlined in educational booklet. Emphasis on 60 grams of protein daily. Pay attention to eating behaviors of no eating/drinking at the same time, chewing food wells, and portion control. Beware of foods that are too dry, may cause dysphagia. Continue hydration with at least 40 ounces of non-calorie, non-carbonated beverages daily. Continue vitamin regiment daily. For physical activity, patient is now free of restrictions and may incorporate more to exercise, yet increase as tolerated. Recommend attending support group. Follow up in 6 weeks. Patient verbalized understanding and questions were answered to the best of my knowledge and ability. Diet  educational materials were provided. Advised to call office with any questions or concerns.

## 2018-06-27 ENCOUNTER — OFFICE VISIT (OUTPATIENT)
Dept: SURGERY | Age: 28
End: 2018-06-27

## 2018-06-27 VITALS
HEART RATE: 77 BPM | BODY MASS INDEX: 47.15 KG/M2 | HEIGHT: 65 IN | TEMPERATURE: 98.2 F | DIASTOLIC BLOOD PRESSURE: 82 MMHG | OXYGEN SATURATION: 97 % | WEIGHT: 283 LBS | SYSTOLIC BLOOD PRESSURE: 117 MMHG | RESPIRATION RATE: 18 BRPM

## 2018-06-27 DIAGNOSIS — K52.9 GASTROENTERITIS, ACUTE: ICD-10-CM

## 2018-06-27 DIAGNOSIS — Z98.84 S/P LAPAROSCOPIC SLEEVE GASTRECTOMY: ICD-10-CM

## 2018-06-27 DIAGNOSIS — Z09 SURGICAL FOLLOW-UP CARE: Primary | ICD-10-CM

## 2018-06-27 DIAGNOSIS — R11.2 NAUSEA AND VOMITING, INTRACTABILITY OF VOMITING NOT SPECIFIED, UNSPECIFIED VOMITING TYPE: ICD-10-CM

## 2018-06-27 NOTE — PATIENT INSTRUCTIONS
Stick to liquid diet for the next 3 days. May have Powerade, soups. Nausea and Vomiting: Care Instructions  Your Care Instructions    When you are nauseated, you may feel weak and sweaty and notice a lot of saliva in your mouth. Nausea often leads to vomiting. Most of the time you do not need to worry about nausea and vomiting, but they can be signs of other illnesses. Two common causes of nausea and vomiting are stomach flu and food poisoning. Nausea and vomiting from viral stomach flu will usually start to improve within 24 hours. Nausea and vomiting from food poisoning may last from 12 to 48 hours. The doctor has checked you carefully, but problems can develop later. If you notice any problems or new symptoms, get medical treatment right away. Follow-up care is a key part of your treatment and safety. Be sure to make and go to all appointments, and call your doctor if you are having problems. It's also a good idea to know your test results and keep a list of the medicines you take. How can you care for yourself at home? · To prevent dehydration, drink plenty of fluids, enough so that your urine is light yellow or clear like water. Choose water and other caffeine-free clear liquids until you feel better. If you have kidney, heart, or liver disease and have to limit fluids, talk with your doctor before you increase the amount of fluids you drink. · Rest in bed until you feel better. · When you are able to eat, try clear soups, mild foods, and liquids until all symptoms are gone for 12 to 48 hours. Other good choices include dry toast, crackers, cooked cereal, and gelatin dessert, such as Jell-O. When should you call for help? Call 911 anytime you think you may need emergency care. For example, call if:  ? · You passed out (lost consciousness). ?Call your doctor now or seek immediate medical care if:  ? · You have symptoms of dehydration, such as:  ¨ Dry eyes and a dry mouth.   ¨ Passing only a little dark urine. ¨ Feeling thirstier than usual.   ? · You have new or worsening belly pain. ? · You have a new or higher fever. ? · You vomit blood or what looks like coffee grounds. ? Watch closely for changes in your health, and be sure to contact your doctor if:  ? · You have ongoing nausea and vomiting. ? · Your vomiting is getting worse. ? · Your vomiting lasts longer than 2 days. ? · You are not getting better as expected. Where can you learn more? Go to http://han-katia.info/. Enter 25 [id number] in the search box to learn more about \"Nausea and Vomiting: Care Instructions. \"  Current as of: March 20, 2017  Content Version: 11.4  © 6615-0267 [employer]. Care instructions adapted under license by [employer] (which disclaims liability or warranty for this information). If you have questions about a medical condition or this instruction, always ask your healthcare professional. [place] [address] any warranty or liability for your use of this information.

## 2018-06-27 NOTE — MR AVS SNAPSHOT
[address] [place] [id number] [phone] Patient: Norman Esteban MRN: [id number] KOV:[birth year] Visit Information Date & Time Provider Department Dept. Phone Encounter #  
 6/27/2018  9:40 AM Rima Lopez NP [place] [address] [phone] [id number] Upcoming Health Maintenance Date Due DTaP/Tdap/Td series (1 - Tdap) 5/17/2011 PAP AKA CERVICAL CYTOLOGY 5/17/2011 Influenza Age 5 to Adult 8/1/2018 Allergies as of 6/27/2018  Review Complete On: 6/27/2018 By: Mila Max LPN No Known Allergies Current Immunizations  Never Reviewed No immunizations on file. Not reviewed this visit You Were Diagnosed With   
  
 Codes Comments Surgical follow-up care    -  Primary ICD-10-CM: T28 ICD-9-CM: V67.00 S/P laparoscopic sleeve gastrectomy     ICD-10-CM: Q32.05 ICD-9-CM: V45.86 Nausea and vomiting, intractability of vomiting not specified, unspecified vomiting type     ICD-10-CM: R11.2 ICD-9-CM: 787.01 Vitals BP Pulse Temp Resp Height(growth percentile) Weight(growth percentile) 117/82 (BP 1 Location: Left arm, BP Patient Position: Sitting) 77 98.2 °F (36.8 °C) (Oral) 18 5' 5\" (1.651 m) 283 lb (128.4 kg) SpO2 BMI OB Status Smoking Status 97% 47.09 kg/m2 IUD Never Smoker Vitals History BMI and BSA Data Body Mass Index Body Surface Area 47.09 kg/m 2 2.43 m 2 Preferred Pharmacy Pharmacy Name Phone [id number] [place] [address] [phone] Your Updated Medication List  
  
   
This list is accurate as of 6/27/18 10:05 AM.  Always use your most recent med list.  
  
  
  
  
 CALCIUM CITRATE + PO Take  by mouth. Ferrous Sulfate 27 mg iron Tab Take  by mouth. hyoscyamine SL 0.125 mg SL tablet Commonly known as:  LEVSIN/SL  
 1 Tab by SubLINGual route every four (4) hours as needed for Cramping.  
  
 loratadine-pseudoephedrine  mg per tablet Commonly known as:  CLARITIN-D 24 HOUR Take 1 Tab by mouth daily. MIRENA 20 mcg/24 hr (5 years) Iud  
Generic drug:  levonorgestrel 1 Each by IntraUTERine route once. Indications: PREGNANCY CONTRACEPTION  
  
 multivitamin with iron tablet Take 1 Tab by mouth daily. NASCOBAL 500 mcg/spray Spry Generic drug:  cyanocobalamin  
by Nasal route. omeprazole 20 mg capsule Commonly known as:  PRILOSEC Take 1 Cap by mouth daily. Indications: PREVENTION OF STRESS ULCER  
  
 ondansetron 4 mg disintegrating tablet Commonly known as:  ZOFRAN ODT Take 1 Tab by mouth every six (6) hours as needed for Nausea. Patient Instructions Stick to liquid diet for the next 3 days. May have Powerade, soups. Nausea and Vomiting: Care Instructions Your Care Instructions When you are nauseated, you may feel weak and sweaty and notice a lot of saliva in your mouth. Nausea often leads to vomiting. Most of the time you do not need to worry about nausea and vomiting, but they can be signs of other illnesses. Two common causes of nausea and vomiting are stomach flu and food poisoning. Nausea and vomiting from viral stomach flu will usually start to improve within 24 hours. Nausea and vomiting from food poisoning may last from 12 to 48 hours. The doctor has checked you carefully, but problems can develop later. If you notice any problems or new symptoms, get medical treatment right away. Follow-up care is a key part of your treatment and safety. Be sure to make and go to all appointments, and call your doctor if you are having problems. It's also a good idea to know your test results and keep a list of the medicines you take. How can you care for yourself at home?  
· To prevent dehydration, drink plenty of fluids, enough so that your urine is light yellow or clear like water. Choose water and other caffeine-free clear liquids until you feel better. If you have kidney, heart, or liver disease and have to limit fluids, talk with your doctor before you increase the amount of fluids you drink. · Rest in bed until you feel better. · When you are able to eat, try clear soups, mild foods, and liquids until all symptoms are gone for 12 to 48 hours. Other good choices include dry toast, crackers, cooked cereal, and gelatin dessert, such as Jell-O. When should you call for help? Call 911 anytime you think you may need emergency care. For example, call if: 
? · You passed out (lost consciousness). ?Call your doctor now or seek immediate medical care if: 
? · You have symptoms of dehydration, such as: ¨ Dry eyes and a dry mouth. ¨ Passing only a little dark urine. ¨ Feeling thirstier than usual.  
? · You have new or worsening belly pain. ? · You have a new or higher fever. ? · You vomit blood or what looks like coffee grounds. ? Watch closely for changes in your health, and be sure to contact your doctor if: 
? · You have ongoing nausea and vomiting. ? · Your vomiting is getting worse. ? · Your vomiting lasts longer than 2 days. ? · You are not getting better as expected. Where can you learn more? Go to http://han-katia.info/. Enter 25 [id number] in the search box to learn more about \"Nausea and Vomiting: Care Instructions. \" Current as of: March 20, 2017 Content Version: 11.4 © 1648-6099 [employer]. Care instructions adapted under license by [employer] (which disclaims liability or warranty for this information). If you have questions about a medical condition or this instruction, always ask your healthcare professional. [place] [address] any warranty or liability for your use of this information. Introducing [address]! [employer] introduces PlayerTakesAll patient portal. Now you can access parts of your medical record, email your doctor's office, and request medication refills online. 1. In your internet browser, go to [email or web address] 2. Click on the First Time User? Click Here link in the Sign In box. You will see the New Member Sign Up page. 3. Enter your PlayerTakesAll Access Code exactly as it appears below. You will not need to use this code after youve completed the sign-up process. If you do not sign up before the expiration date, you must request a new code. · PlayerTakesAll Access Code: [email or web address] Expires: 7/10/2018  7:51 AM 
 
4. Enter the last four digits of your Social Security Number (xxxx) and Date of Birth (mm/dd/yyyy) as indicated and click Submit. You will be taken to the next sign-up page. 5. Create a PlayerTakesAll ID. This will be your PlayerTakesAll login ID and cannot be changed, so think of one that is secure and easy to remember. 6. Create a PlayerTakesAll password. You can change your password at any time. 7. Enter your Password Reset Question and Answer. This can be used at a later time if you forget your password. 8. Enter your e-mail address. You will receive e-mail notification when new information is available in [address] 9. Click Sign Up. You can now view and download portions of your medical record. 10. Click the Download Summary menu link to download a portable copy of your medical information. If you have questions, please visit the Frequently Asked Questions section of the PlayerTakesAll website. Remember, PlayerTakesAll is NOT to be used for urgent needs. For medical emergencies, dial 911. Now available from your iPhone and Android! Please provide this summary of care documentation to your next provider. Your primary care clinician is listed as Glenn Hoffman. If you have any questions after today's visit, please call [phone].

## 2018-06-27 NOTE — PROGRESS NOTES
Ritesh Silvestre is a 29 y.o. female 8 weeks status post lap sleeve gastrectomy, down 29 pounds. Weight today is 283 pounds. Patient comes in with 1-1.5 days history of nausea, vomiting, epigastric discomfort, and loose stools. Stated she doesn't know how issues started, yet went out with friends and stated everyone else is sick too. Stated issues unrelated to eating. Complaint of nausea, 2 episodes of vomiting, no heartburn/reflux. Denies dysphagia. Unsure of any fever/no chills, no shortness of breath, no chest pain, and no abdominal pain. Stated she went back to liquid diet. Drinking at least 40 ounces of water daily without difficulty. Tolerating all vitamins and medications. No issues with urination. Bowel movements with loose stools 1-2 daily. HPI    Review of Systems   Constitutional: Negative for chills, fever and malaise/fatigue. Respiratory: Negative for cough, sputum production and shortness of breath. Cardiovascular: Negative for chest pain, palpitations and leg swelling. Gastrointestinal: Positive for nausea and vomiting. Negative for abdominal pain, blood in stool, constipation, diarrhea and heartburn. Complaint of loose stools    Genitourinary: Negative for dysuria. Neurological: Negative for dizziness. Physical Exam   Constitutional: She is oriented to person, place, and time. She appears well-developed and well-nourished. No distress. Cardiovascular: Normal rate, regular rhythm and normal heart sounds. Pulmonary/Chest: Effort normal and breath sounds normal. No respiratory distress. She has no wheezes. She has no rales. Abdominal: Soft. Bowel sounds are normal. She exhibits no distension. There is no tenderness. There is no rebound and no guarding. Lap sites dry and intact. No hernia/masses palpated   Musculoskeletal: Normal range of motion. She exhibits no edema. Neurological: She is alert and oriented to person, place, and time. Skin: Skin is warm and dry.  No rash noted. No erythema. Psychiatric: Her behavior is normal.   Blood pressure 117/82, pulse 77, temperature 98.2 °F (36.8 °C), temperature source Oral, resp. rate 18, height 5' 5\" (1.651 m), weight 283 lb (128.4 kg), SpO2 97 %. ASSESSMENT and PLAN  8 weeks status post lap sleeve gastrectomy, down 29 pounds. Weight today is 283 pounds    Advised patient concerns for mild early gastroenteritis. Advised patient to stick to full liquid diet for the next 3 days. May have soups, broth, jello, Gatorade/Powerade, and protein supplementation. Patient stated she has Zofran, anti-emetic at home, advised to use as needed. Advised may use over the counter Pepto-Bismuth for stools. Continue with hydration with water, bariatric vitamins. Advised may advance diet after 3 days if feeling better. Patient verbalized understanding and questions were answered to the best of my knowledge and ability. Nausea and vomiting educational materials were provided. Advised if any questions or concerns to call the office.

## 2018-06-27 NOTE — LETTER
NOTIFICATION OF RETURN TO WORK  
 
6/27/2018 10:04 AM 
 
Ms. Porfirio Stallworth [address] [facility] [address] [place] To Whom It May Concern: 
 
Porfirio Stallworth was under the care of [address] [place] [address] and seen on June 27, 2018 She will be able to return to work on June 29, 2018 If there are questions or concerns please have the patient contact our office.  
 
Sincerely, 
 
 
 
 
Horacio Sanchez NP

## 2018-06-27 NOTE — PROGRESS NOTES
1. Have you been to the ER, urgent care clinic since your last visit? Hospitalized since your last visit? No    2. Have you seen or consulted any other health care providers outside of the [address] [place] [address] since your last visit? Include any pap smears or colon screening.  No

## 2018-07-30 ENCOUNTER — OFFICE VISIT (OUTPATIENT)
Dept: SURGERY | Age: 28
End: 2018-07-30

## 2018-07-30 VITALS
RESPIRATION RATE: 18 BRPM | DIASTOLIC BLOOD PRESSURE: 80 MMHG | SYSTOLIC BLOOD PRESSURE: 110 MMHG | HEART RATE: 60 BPM | HEIGHT: 65 IN | OXYGEN SATURATION: 97 % | WEIGHT: 277.2 LBS | BODY MASS INDEX: 46.19 KG/M2

## 2018-07-30 DIAGNOSIS — Z98.84 S/P LAPAROSCOPIC SLEEVE GASTRECTOMY: ICD-10-CM

## 2018-07-30 DIAGNOSIS — E66.01 MORBID OBESITY (HCC): Primary | ICD-10-CM

## 2018-07-30 NOTE — PROGRESS NOTES
HISTORY OF PRESENT ILLNESS  Nguyen Ding is a 29 y.o. female with previous Sleeve gastrectomy on 4/26/2018 . She has lost a total of 35 pounds since surgery. She  Has lost 6 lbs since the last ov. Body mass index is 46.13 kg/(m^2). . no nausea and no vomiting . Denies Acid reflux/heartburn. . Drinking  64+ ounces of water daily. 40 grams of protein intake daily. + BM's. Pt is going to start exercising. Dietary recall -    Breakfast- fruit toast or peanut butter crackers or shakes  Lunch- salad with tuna, grilled chicken  Dinner- meat     She is not snacking between meals; Tiffanie Gearing Vitamins:  MVI : yes  Calcium : yes  B-Vit 12: yes    Patient has an advanced directive: yes    Ms. Coleman Meyers has a reminder for a \"due or due soon\" health maintenance. I have asked that she contact her primary care provider for follow-up on this health maintenance. Co-Morbid(s)               COMORBIDITY     GERD  (req.meds)           yes    Current Outpatient Prescriptions:     multivitamin with iron tablet, Take 1 Tab by mouth daily. , Disp: , Rfl:     calcium carb/vit D2/minerals (CALCIUM CITRATE + PO), Take 1 Tab by mouth two (2) times a day., Disp: , Rfl:     cyanocobalamin (NASCOBAL) 500 mcg/spray spry, 1 Spray by Nasal route every seven (7) days. , Disp: , Rfl:     Ferrous Sulfate 27 mg iron tab, Take 1 Tab by mouth daily. , Disp: , Rfl:     levonorgestrel (MIRENA) 20 mcg/24 hr (5 years) IUD, 1 Each by IntraUTERine route once. Indications: PREGNANCY CONTRACEPTION, Disp: , Rfl:     hyoscyamine SL (LEVSIN/SL) 0.125 mg SL tablet, 1 Tab by SubLINGual route every four (4) hours as needed for Cramping., Disp: 30 Tab, Rfl: 0    omeprazole (PRILOSEC) 20 mg capsule, Take 1 Cap by mouth daily.  Indications: PREVENTION OF STRESS ULCER, Disp: 30 Cap, Rfl: 2    ondansetron (ZOFRAN ODT) 4 mg disintegrating tablet, Take 1 Tab by mouth every six (6) hours as needed for Nausea., Disp: 30 Tab, Rfl: 0    loratadine-pseudoephedrine (CLARITIN-D 24 HOUR)  mg per tablet, Take 1 Tab by mouth daily. , Disp: 10 Tab, Rfl: 0      Visit Vitals    /80 (BP 1 Location: Left arm, BP Patient Position: Sitting)    Pulse 60    Resp 18    Ht 5' 5\" (1.651 m)    Wt 277 lb 3.2 oz (125.7 kg)    SpO2 97%    BMI 46.13 kg/m2     HPI    Review of Systems   Constitutional: Negative for chills and fever. Respiratory: Negative for cough. Cardiovascular: Negative for chest pain. Gastrointestinal: Negative for abdominal pain, heartburn, nausea and vomiting. Neurological: Negative for dizziness and headaches. Physical Exam   Constitutional: She is oriented to person, place, and time. She appears well-developed and well-nourished. Cardiovascular: Normal rate and regular rhythm. Exam reveals no gallop and no friction rub. No murmur heard. Pulmonary/Chest: Effort normal and breath sounds normal.   Abdominal: Soft. Bowel sounds are normal. She exhibits no distension. There is no tenderness. No masses or hernias noted. Neurological: She is alert and oriented to person, place, and time. Skin: Skin is warm and dry. Psychiatric: She has a normal mood and affect. Isabela Denson is a 29 y.o. female with previous Sleeve gastrectomy on 4/26/2018 . She has lost a total of 35 pounds since surgery. She  Has lost 6 lbs since the last ov. Body mass index is 46.13 kg/(m^2). Billy Cardenas Advised patient regard to diet that is high-protein, low-fat, low-sugar, limited carbohydrates. Strive for 50-60 grams of protein daily. If having a snack, foods that are protein or fiber rich. . No eating/drinking together, chew foods well, and portion control. Measure meals. Discussed snacking behavior and to [facility] pay attention to behavioral factor and habits. Drink at least 40-64 ounces of water or non-calorie/non-carbonated beverages daily. Continue vitamin regiment daily. Exercise at least 3 days a week with cardiovascular and strength training. Patient to follow up in 3 months. Advised to call office if any questions/concerns. Pt verbalized understanding and questions were answered to the best of my knowledge and ability.

## 2018-07-30 NOTE — PROGRESS NOTES
Follow up for lap sleeve gastrectomy on 4/26/18. 1. Have you been to the ER, urgent care clinic since your last visit? Hospitalized since your last visit? No    2. Have you seen or consulted any other health care providers outside of the [address] [place] [address] since your last visit? Include any pap smears or colon screening.  No      Weight loss since surgery:  35 lb    Weight loss since last OV:   6 lb

## 2018-07-30 NOTE — LETTER
NOTIFICATION RETURN TO WORK / SCHOOL 
 
7/30/2018 10:44 AM 
 
Ms. Olena Ruano [address] [address] To Whom It May Concern: 
 
Olena Ruano is currently under the care of [address] [place] [address]. Please excuse her from work today, 7/30/2018. She had an appointment. If there are questions or concerns please have the patient contact our office. Sincerely, Chip Clifford NP

## 2018-08-27 ENCOUNTER — TELEPHONE (OUTPATIENT)
Dept: SURGERY | Age: 28
End: 2018-08-27

## 2018-11-15 ENCOUNTER — HOSPITAL ENCOUNTER (EMERGENCY)
Age: 28
Discharge: HOME | End: 2018-11-15
Attending: EMERGENCY MEDICINE
Payer: COMMERCIAL

## 2018-11-15 ENCOUNTER — APPOINTMENT (OUTPATIENT)
Dept: GENERAL RADIOLOGY | Age: 28
End: 2018-11-15
Attending: PHYSICIAN ASSISTANT
Payer: COMMERCIAL

## 2018-11-15 VITALS
HEIGHT: 65 IN | OXYGEN SATURATION: 100 % | BODY MASS INDEX: 43.15 KG/M2 | DIASTOLIC BLOOD PRESSURE: 78 MMHG | SYSTOLIC BLOOD PRESSURE: 117 MMHG | TEMPERATURE: 97.4 F | RESPIRATION RATE: 20 BRPM | WEIGHT: 259 LBS | HEART RATE: 90 BPM

## 2018-11-15 DIAGNOSIS — J20.9 ACUTE BRONCHITIS, UNSPECIFIED ORGANISM: Primary | ICD-10-CM

## 2018-11-15 LAB — HCG UR QL: NEGATIVE

## 2018-11-15 PROCEDURE — 71046 X-RAY EXAM CHEST 2 VIEWS: CPT

## 2018-11-15 PROCEDURE — 81025 URINE PREGNANCY TEST: CPT

## 2018-11-15 PROCEDURE — 99283 EMERGENCY DEPT VISIT LOW MDM: CPT

## 2018-11-15 RX ORDER — LORATADINE AND PSEUDOEPHEDRINE 10; 240 MG/1; MG/1
1 TABLET, EXTENDED RELEASE ORAL DAILY
Qty: 10 TAB | Refills: 0 | OUTPATIENT
Start: 2018-11-15 | End: 2019-11-09

## 2018-11-15 RX ORDER — ALBUTEROL SULFATE 90 UG/1
1-2 AEROSOL, METERED RESPIRATORY (INHALATION)
Qty: 1 INHALER | Refills: 1 | Status: SHIPPED | OUTPATIENT
Start: 2018-11-15

## 2018-11-15 RX ORDER — PREDNISONE 10 MG/1
TABLET ORAL
Qty: 21 TAB | Refills: 0 | Status: SHIPPED | OUTPATIENT
Start: 2018-11-15

## 2018-11-15 RX ORDER — AZITHROMYCIN 250 MG/1
TABLET, FILM COATED ORAL
Qty: 6 TAB | Refills: 0 | Status: SHIPPED | OUTPATIENT
Start: 2018-11-15

## 2018-11-15 NOTE — LETTER
[facility] EMERGENCY DEPT 
[address] [facility] [address] [id number] [phone] Work/School Note Date: 11/15/2018 To Whom It May concern: 
 
Dea Bocanegra was seen and treated today in the emergency room by the following provider(s): 
Attending Provider: Robin Menon MD 
Physician Assistant: Edgar Garcia PA-C. Dea Bocanegra may return to work on 11/17/2018. Sincerely, Janusz Jhaveri PA-C

## 2018-11-16 NOTE — ED PROVIDER NOTES
EMERGENCY DEPARTMENT HISTORY AND PHYSICAL EXAM 
 
Date: 11/15/2018 Patient Name: Maritza Perez History of Presenting Illness Chief Complaint Patient presents with  Cold Symptoms X 1 month History Provided By: Patient HPI: Maritza Perez is a 29 y.o. female with a PMH of bronchitis, gERD, arrhythmia, iron deficiency anemia, VGS,  who presents with acute moderate intermittent dry cough w/ cp, sore throat, rhinorrhea, nasal congestion, intermittent sob x 1 month. OTC cough/cold medications without relief. No meds today. Denies fever, chills, n/v, abd pain, hemoptysis, HEARD, ear pain, headache, lightheadedness, dizziness. PCP: Jose Andrade MD 
 
Current Outpatient Medications Medication Sig Dispense Refill  loratadine-pseudoephedrine (CLARITIN-D 24 HOUR)  mg per tablet Take 1 Tab by mouth daily. 10 Tab 0  
 albuterol (PROVENTIL HFA, VENTOLIN HFA, PROAIR HFA) 90 mcg/actuation inhaler Take 1-2 Puffs by inhalation every four (4) hours as needed for Wheezing. 1 Inhaler 1  predniSONE (STERAPRED DS) 10 mg dose pack See administration instruction per 10mg dose pack 21 Tab 0  
 azithromycin (ZITHROMAX) 250 mg tablet Take two tablets today then one tablet daily 6 Tab 0  
 multivitamin with iron tablet Take 1 Tab by mouth daily.  calcium carb/vit D2/minerals (CALCIUM CITRATE + PO) Take 1 Tab by mouth two (2) times a day.  cyanocobalamin (NASCOBAL) 500 mcg/spray spry 1 [place] by Nasal route every seven (7) days.  Ferrous Sulfate 27 mg iron tab Take 1 Tab by mouth daily.  hyoscyamine SL (LEVSIN/SL) 0.125 mg SL tablet 1 Tab by SubLINGual route every four (4) hours as needed for Cramping. 30 Tab 0  
 omeprazole (PRILOSEC) 20 mg capsule Take 1 Cap by mouth daily. Indications: PREVENTION OF STRESS ULCER 30 Cap 2  
 ondansetron (ZOFRAN ODT) 4 mg disintegrating tablet Take 1 Tab by mouth every six (6) hours as needed for Nausea.  30 Tab 0  
  levonorgestrel (MIRENA) 20 mcg/24 hr (5 years) IUD 1 Each by IntraUTERine route once. Indications: PREGNANCY CONTRACEPTION Past History Past Medical History: 
Past Medical History:  
Diagnosis Date  Arrhythmia MURMUR AS A CHILD  Bronchitis  Dyspepsia and other specified disorders of function of stomach  GERD (gastroesophageal reflux disease)  Iron deficiency anemia 4/11/2018 Past Surgical History: 
Past Surgical History:  
Procedure Laterality Date  HX GYN    
 c section x2  HX OTHER SURGICAL  04/11/2018 Lap Sleeve Gastrectomy -[facility]-Dr. Natalie Herrera Family History: 
Family History Problem Relation Age of Onset  Diabetes Mother  Elevated Lipids Maternal Grandmother  Psychiatric Disorder Maternal Grandmother  Heart Disease Maternal Grandfather  Psychiatric Disorder Maternal Grandfather  Heart Disease Father Social History: 
Social History Tobacco Use  Smoking status: Never Smoker  Smokeless tobacco: Never Used Substance Use Topics  Alcohol use: Yes Comment:  1 X A MONTH  
 Drug use: No  
 
 
Allergies: 
No Known Allergies Review of Systems Review of Systems Constitutional: Negative for activity change, chills, fatigue and fever. HENT: Positive for congestion, postnasal drip, rhinorrhea and sore throat. Negative for ear discharge, ear pain, sneezing and trouble swallowing. Eyes: Negative for pain and redness. Respiratory: Positive for cough and shortness of breath. Negative for chest tightness, wheezing and stridor. Cardiovascular: Negative for palpitations and leg swelling. Gastrointestinal: Negative for abdominal pain, diarrhea, nausea and vomiting. Genitourinary: Negative. Musculoskeletal: Negative for arthralgias and myalgias. Skin: Negative. Negative for rash. Allergic/Immunologic: Negative for environmental allergies. Neurological: Negative for headaches. Psychiatric/Behavioral: Negative. Physical Exam  
 
Vitals:  
 11/15/18 1932 BP: 117/78 Pulse: 90 Resp: 20 Temp: 97.4 °F (36.3 °C) SpO2: 100% Weight: 117.5 kg (259 lb) Height: 5' 5\" (1.651 m) Physical Exam  
Constitutional: She is oriented to person, place, and time. She appears well-developed and well-nourished. No distress. HENT:  
Head: Normocephalic and atraumatic. Right Ear: Hearing, tympanic membrane, external ear and ear canal normal.  
Left Ear: Hearing, tympanic membrane, external ear and ear canal normal.  
Nose: Mucosal edema and rhinorrhea present. Right sinus exhibits no maxillary sinus tenderness and no frontal sinus tenderness. Left sinus exhibits no maxillary sinus tenderness and no frontal sinus tenderness. Mouth/Throat: Uvula is midline, oropharynx is clear and moist and mucous membranes are normal. Mucous membranes are not dry. No trismus in the jaw. No uvula swelling. No oropharyngeal exudate, posterior oropharyngeal edema, posterior oropharyngeal erythema or tonsillar abscesses. Eyes: Conjunctivae and EOM are normal. Pupils are equal, round, and reactive to light. Neck: Normal range of motion. Cardiovascular: Normal rate, regular rhythm, normal heart sounds and intact distal pulses. Exam reveals no gallop and no friction rub. No murmur heard. Pulmonary/Chest: Effort normal and breath sounds normal. No accessory muscle usage. No respiratory distress. She has no decreased breath sounds. She has no wheezes. She has no rhonchi. She has no rales. Abdominal: Soft. There is no tenderness. Musculoskeletal: Normal range of motion. Neurological: She is alert and oriented to person, place, and time. Skin: Skin is warm and dry. She is not diaphoretic. Psychiatric: She has a normal mood and affect. Her behavior is normal. Judgment and thought content normal.  
Nursing note and vitals reviewed. Diagnostic Study Results Labs - 
  
 Recent Results (from the past 12 hour(s)) HCG URINE, QL. - POC Collection Time: 11/15/18  7:52 PM  
Result Value Ref Range Pregnancy test,urine (POC) NEGATIVE  NEG Radiologic Studies -  
XR CHEST PA LAT Final Result CT Results  (Last 48 hours) None CXR Results  (Last 48 hours)  
          
 11/15/18 1957  XR CHEST PA LAT Final result Impression:  Impression: Normal exam.  
   
  
 Narrative:  Exam:  2 view chest  
   
Indication: Cough for one month with mid chest pain PA and lateral views demonstrate normal heart size. There is no acute process in  
the lung fields. The osseous structures are unremarkable. Medical Decision Making I am the first provider for this patient. I reviewed the vital signs, available nursing notes, past medical history, past surgical history, family history and social history. Vital Signs-Reviewed the patient's vital signs. Records Reviewed: Nursing Notes and Old Medical Records Disposition: 
 
DISCHARGE NOTE:  
8:05 PM 
 
  Care plan outlined and precautions discussed. Patient has no new complaints, changes, or physical findings. Results of exam and labs were reviewed with the patient. All medications were reviewed with the patient; will d/c home with azithromycin, albuterol, prednisone, claritin d. All of pt's questions and concerns were addressed. Patient was instructed and agrees to follow up with PCP, as well as to return to the ED upon further deterioration. Patient is ready to go home. Follow-up Information Follow up With Specialties Details Why Contact Info Marian Palomo MD Family Practice Schedule an appointment as soon as possible for a visit in 1 week As needed, If symptoms worsen [address] [place] [address] 
[phone] Current Discharge Medication List  
  
START taking these medications Details albuterol (PROVENTIL HFA, VENTOLIN HFA, PROAIR HFA) 90 mcg/actuation inhaler Take 1-2 Puffs by inhalation every four (4) hours as needed for Wheezing. Qty: 1 Inhaler, Refills: 1  
  
predniSONE (STERAPRED DS) 10 mg dose pack See administration instruction per 10mg dose pack Qty: 21 Tab, Refills: 0  
  
azithromycin (ZITHROMAX) 250 mg tablet Take two tablets today then one tablet daily 
Qty: 6 Tab, Refills: 0 CONTINUE these medications which have CHANGED Details  
loratadine-pseudoephedrine (CLARITIN-D 24 HOUR)  mg per tablet Take 1 Tab by mouth daily. Qty: 10 Tab, Refills: 0 CONTINUE these medications which have NOT CHANGED Details  
multivitamin with iron tablet Take 1 Tab by mouth daily. calcium carb/vit D2/minerals (CALCIUM CITRATE + PO) Take 1 Tab by mouth two (2) times a day. cyanocobalamin (NASCOBAL) 500 mcg/spray spry 1 [place] by Nasal route every seven (7) days. Ferrous Sulfate 27 mg iron tab Take 1 Tab by mouth daily. hyoscyamine SL (LEVSIN/SL) 0.125 mg SL tablet 1 Tab by SubLINGual route every four (4) hours as needed for Cramping. Qty: 30 Tab, Refills: 0  
  
omeprazole (PRILOSEC) 20 mg capsule Take 1 Cap by mouth daily. Indications: PREVENTION OF STRESS ULCER Qty: 30 Cap, Refills: 2  
  
ondansetron (ZOFRAN ODT) 4 mg disintegrating tablet Take 1 Tab by mouth every six (6) hours as needed for Nausea. Qty: 30 Tab, Refills: 0  
  
levonorgestrel (MIRENA) 20 mcg/24 hr (5 years) IUD 1 Each by IntraUTERine route once. Indications: PREGNANCY CONTRACEPTION Provider Notes (Medical Decision Making): DDX: uri, bronchitis, pna, post viral cough syndrome Procedures: 
Procedures Diagnosis Clinical Impression: 1. Acute bronchitis, unspecified organism

## 2018-11-16 NOTE — DISCHARGE INSTRUCTIONS
Bronchitis: Care Instructions  Your Care Instructions    Bronchitis is inflammation of the bronchial tubes, which carry air to the lungs. The tubes swell and produce mucus, or phlegm. The mucus and inflamed bronchial tubes make you cough. You may have trouble breathing. Most cases of bronchitis are caused by viruses like those that cause colds. Antibiotics usually do not help and they may be harmful. Bronchitis usually develops rapidly and lasts about 2 to 3 weeks in otherwise healthy people. Follow-up care is a key part of your treatment and safety. Be sure to make and go to all appointments, and call your doctor if you are having problems. It's also a good idea to know your test results and keep a list of the medicines you take. How can you care for yourself at home? · Take all medicines exactly as prescribed. Call your doctor if you think you are having a problem with your medicine. · Get some extra rest.  · Take an over-the-counter pain medicine, such as acetaminophen (Tylenol), ibuprofen (Advil, Motrin), or naproxen (Aleve) to reduce fever and relieve body aches. Read and follow all instructions on the label. · Do not take two or more pain medicines at the same time unless the doctor told you to. Many pain medicines have acetaminophen, which is Tylenol. Too much acetaminophen (Tylenol) can be harmful. · Take an over-the-counter cough medicine that contains dextromethorphan to help quiet a dry, hacking cough so that you can sleep. Avoid cough medicines that have more than one active ingredient. Read and follow all instructions on the label. · Breathe moist air from a humidifier, hot shower, or sink filled with hot water. The heat and moisture will thin mucus so you can cough it out. · Do not smoke. Smoking can make bronchitis worse. If you need help quitting, talk to your doctor about stop-smoking programs and medicines. These can increase your chances of quitting for good.   When should you call for help? Call 911 anytime you think you may need emergency care. For example, call if:    · You have severe trouble breathing.    Call your doctor now or seek immediate medical care if:    · You have new or worse trouble breathing.     · You cough up dark brown or bloody mucus (sputum).     · You have a new or higher fever.     · You have a new rash.    Watch closely for changes in your health, and be sure to contact your doctor if:    · You cough more deeply or more often, especially if you notice more mucus or a change in the color of your mucus.     · You are not getting better as expected. Where can you learn more? Go to http://han-katia.info/. Enter H333 in the search box to learn more about \"Bronchitis: Care Instructions. \"  Current as of: December 6, 2017  Content Version: 11.8  © 7307-5602 [employer] with [employer]. Care instructions adapted under license by [employer] (which disclaims liability or warranty for this information). If you have questions about a medical condition or this instruction, always ask your healthcare professional. [address] any warranty or liability for your use of this information.

## 2018-11-16 NOTE — ED NOTES
Emergency Department Nursing Plan of Care The Nursing Plan of Care is developed from the Nursing assessment and Emergency Department Attending provider initial evaluation. The plan of care may be reviewed in the ED Provider note. The Plan of Care was developed with the following considerations:  
Patient / Family readiness to learn indicated by:verbalized understanding Persons(s) to be included in education: patient Barriers to Learning/Limitations:No 
 
Signed Roderick Lopez RN   
11/15/2018   7:48 PM

## 2018-11-16 NOTE — ED TRIAGE NOTES
Pt states productive/non-productive cough X 1 month w/ mid CP w/ cough, sore throat, and nasal drainage.

## 2018-12-11 ENCOUNTER — OFFICE VISIT (OUTPATIENT)
Dept: SURGERY | Age: 28
End: 2018-12-11

## 2018-12-11 VITALS
HEIGHT: 65 IN | WEIGHT: 258 LBS | BODY MASS INDEX: 42.99 KG/M2 | TEMPERATURE: 98.5 F | SYSTOLIC BLOOD PRESSURE: 121 MMHG | RESPIRATION RATE: 18 BRPM | OXYGEN SATURATION: 94 % | DIASTOLIC BLOOD PRESSURE: 82 MMHG | HEART RATE: 95 BPM

## 2018-12-11 DIAGNOSIS — E66.01 MORBID OBESITY WITH BMI OF 40.0-44.9, ADULT (HCC): ICD-10-CM

## 2018-12-11 DIAGNOSIS — Z98.84 S/P LAPAROSCOPIC SLEEVE GASTRECTOMY: Primary | ICD-10-CM

## 2018-12-11 NOTE — PROGRESS NOTES
1. Have you been to the ER, urgent care clinic since your last visit? Hospitalized since your last visit? No    2. Have you seen or consulted any other health care providers outside of the [address] [place] [address] since your last visit? Include any pap smears or colon screening.  No

## 2018-12-11 NOTE — LETTER
12/11/2018 1:57 PM 
 
Patient:  Yaz Hennessy YOB: 1990 Date of Visit: 12/11/2018 Dear Denise Byers MD 
[id number] [id number] VIA In Basket 
 : Thank you for referring Ms. Clovis Vicente to me for evaluation/treatment. Below are the relevant portions of my assessment and plan of care. If you have questions, please do not hesitate to call me. I look forward to following Ms. Catia Daly along with you. Sincerely, Bobo Pierce MD

## 2018-12-11 NOTE — PROGRESS NOTES
[facility] Follow up Appointment    History of Present Illness:      Maritza Perez is a 29 y.o. female who is 8 months s/p sleeve gastrectomy. She is down 54lbs since her surgery. She has not been having any post op issues with N/V or dysphagia. She has been getting in about 60g of protein daily at home. She does admit to cheating and eating some sweets and snacks. She is not working out at all currently due to her busy work schedule. She has been drinking at least 64oz water daily. She does say she does not want to lose her weight fast post op. Past Medical History:   Diagnosis Date    Arrhythmia     MURMUR AS A CHILD    Bronchitis     Dyspepsia and other specified disorders of function of stomach     GERD (gastroesophageal reflux disease)     Iron deficiency anemia 4/11/2018       Past Surgical History:   Procedure Laterality Date    HX GYN      c section x2    HX OTHER SURGICAL  04/11/2018    Lap Sleeve Gastrectomy -[facility]-Dr. Leesa Chen         Current Outpatient Medications:     loratadine-pseudoephedrine (CLARITIN-D 24 HOUR)  mg per tablet, Take 1 Tab by mouth daily. , Disp: 10 Tab, Rfl: 0    albuterol (PROVENTIL HFA, VENTOLIN HFA, PROAIR HFA) 90 mcg/actuation inhaler, Take 1-2 Puffs by inhalation every four (4) hours as needed for Wheezing., Disp: 1 Inhaler, Rfl: 1    predniSONE (STERAPRED DS) 10 mg dose pack, See administration instruction per 10mg dose pack, Disp: 21 Tab, Rfl: 0    multivitamin with iron tablet, Take 1 Tab by mouth daily. , Disp: , Rfl:     calcium carb/vit D2/minerals (CALCIUM CITRATE + PO), Take 1 Tab by mouth two (2) times a day., Disp: , Rfl:     cyanocobalamin (NASCOBAL) 500 mcg/spray spry, 1 [place] by Nasal route every seven (7) days. , Disp: , Rfl:     Ferrous Sulfate 27 mg iron tab, Take 1 Tab by mouth daily. , Disp: , Rfl:     hyoscyamine SL (LEVSIN/SL) 0.125 mg SL tablet, 1 Tab by SubLINGual route every four (4) hours as needed for Cramping., Disp: 30 Tab, Rfl: 0    omeprazole (PRILOSEC) 20 mg capsule, Take 1 Cap by mouth daily. Indications: PREVENTION OF STRESS ULCER, Disp: 30 Cap, Rfl: 2    ondansetron (ZOFRAN ODT) 4 mg disintegrating tablet, Take 1 Tab by mouth every six (6) hours as needed for Nausea., Disp: 30 Tab, Rfl: 0    levonorgestrel (MIRENA) 20 mcg/24 hr (5 years) IUD, 1 Each by IntraUTERine route once.  Indications: PREGNANCY CONTRACEPTION, Disp: , Rfl:     azithromycin (ZITHROMAX) 250 mg tablet, Take two tablets today then one tablet daily, Disp: 6 Tab, Rfl: 0    No Known Allergies    Social History     Socioeconomic History    Marital status: SINGLE     Spouse name: Not on file    Number of children: Not on file    Years of education: Not on file    Highest education level: Not on file   Social Needs    Financial resource strain: Not on file    Food insecurity - worry: Not on file    Food insecurity - inability: Not on file    Transportation needs - medical: Not on file   [employer] needs - non-medical: Not on file   Occupational History    Not on file   Tobacco Use    Smoking status: Never Smoker    Smokeless tobacco: Never Used   Substance and Sexual Activity    Alcohol use: Yes     Comment:  1 X A MONTH    Drug use: No    Sexual activity: Yes     Partners: Female     Birth control/protection: Implant, IUD   Other Topics Concern    Not on file   Social History Narrative    Not on file       Family History   Problem Relation Age of Onset    Diabetes Mother     Elevated Lipids Maternal Grandmother     Psychiatric Disorder Maternal Grandmother     Heart Disease Maternal Grandfather     Psychiatric Disorder Maternal Grandfather     Heart Disease Father        ROS   Constitutional: negative  Ears, Nose, Mouth, Throat, and Face: negative  Respiratory: negative  Cardiovascular: negative  Gastrointestinal: negative  Genitourinary:negative  Integument/Breast: negative  Hematologic/Lymphatic: negative  Behavioral/Psychiatric: negative  Allergic/Immunologic: negative      Physical Exam:     Visit Vitals  /82 (BP 1 Location: Left arm, BP Patient Position: Sitting)   Pulse 95   Temp 98.5 °F (36.9 °C) (Oral)   Resp 18   Ht 5' 5\" (1.651 m)   Wt 258 lb (117 kg)   SpO2 94%   BMI 42.93 kg/m²       General - alert and oriented, no apparent distress  HEENT - no jaundice, no hearing imparement  Pulm - CTAB, no C/W/R  CV - RRR, no M/R/G  Abd - soft, ND, BS present, incisions c/d/i  Ext - pulses intact in UE and LE bilaterally, no edema  Skin - supple, no rashes  Psychiatric - normal affect, good mood    Labs  none    Imaging  none  I have reviewed and agree with all of the pertinent images    Assessment:     Iwona Cook is a 29 y.o. female s/p laparoscopic sleeve gastrectomy    Recommendations:     1. She is 9 months out and has lost 54 lbs. I would have expected her weight loss to be higher than this at this point. She has being eating sweets and sometimes too much food, she is also not working out at all. She says she knows what to do to get on track with her diet. I did reinforce that seeing [facility] with nutrition is a good idea to help get back on track. She wants to lose another 80lbs which is unlikely but a good goal to have. Follow up in 3 months. Monserrat Liang MD    Ms. Niki Ahn has a reminder for a \"due or due soon\" health maintenance. I have asked that she contact her primary care provider for follow-up on this health maintenance.

## 2019-02-25 ENCOUNTER — TELEPHONE (OUTPATIENT)
Dept: SURGERY | Age: 29
End: 2019-02-25

## 2019-04-06 RX ORDER — CYANOCOBALAMIN 500 UG/1
SPRAY NASAL
Qty: 4 ML | Refills: 8 | Status: SHIPPED | OUTPATIENT
Start: 2019-04-06

## 2019-05-30 ENCOUNTER — OFFICE VISIT (OUTPATIENT)
Dept: SURGERY | Age: 29
End: 2019-05-30

## 2019-05-30 VITALS
TEMPERATURE: 97.6 F | HEART RATE: 78 BPM | WEIGHT: 256.5 LBS | SYSTOLIC BLOOD PRESSURE: 120 MMHG | RESPIRATION RATE: 18 BRPM | BODY MASS INDEX: 42.74 KG/M2 | HEIGHT: 65 IN | DIASTOLIC BLOOD PRESSURE: 84 MMHG

## 2019-05-30 DIAGNOSIS — E53.8 VITAMIN B12 DEFICIENCY: ICD-10-CM

## 2019-05-30 DIAGNOSIS — E55.9 VITAMIN D DEFICIENCY: ICD-10-CM

## 2019-05-30 DIAGNOSIS — Z98.84 S/P LAPAROSCOPIC SLEEVE GASTRECTOMY: ICD-10-CM

## 2019-05-30 DIAGNOSIS — E66.01 MORBID OBESITY (HCC): Primary | ICD-10-CM

## 2019-05-30 DIAGNOSIS — D64.9 ANEMIA, UNSPECIFIED TYPE: ICD-10-CM

## 2019-05-30 NOTE — PROGRESS NOTES
1. Have you been to the ER, urgent care clinic since your last visit? Hospitalized since your last visit? No    2. Have you seen or consulted any other health care providers outside of the [address] [place] [address] since your last visit? Include any pap smears or colon screening. No    Ada Blonder  Body composition    female  34 y.o. Vitals:    05/30/19 1137   BP: 120/84   Pulse: 78   Resp: 18   Temp: 97.6 °F (36.4 °C)   TempSrc: Oral   Weight: 256 lb 8 oz (116.3 kg)   Height: 5' 5\" (1.651 m)     Body mass index is 42.68 kg/m². H&P Weight on 4/11/18 was 312 pounds. Last office visit weight on 12/11/18 was 258 pounds.

## 2019-05-30 NOTE — LETTER
NOTIFICATION RETURN TO WORK / SCHOOL 
 
5/30/2019 11:48 AM 
 
Ms. Miguel Hererra [address] [facility] [phone] To Whom It May Concern: 
 
Ethyl Buoy is currently under the care of [facility] [address] [facility]. Please excuse her from work today, 5/30/2019. She had a doctors appointment. If there are questions or concerns please have the patient contact our office. Sincerely, Diaz Krause NP

## 2019-05-30 NOTE — PROGRESS NOTES
HISTORY OF PRESENT ILLNESS  Kriss Aguilar is a 34 y.o. female with previous Sleeve gastrectomt surgery 13 months ago. . She has lost a total of 55.5 pounds since surgery. She  Has lost 1.5 lbs since the last ov. Body mass index is 42.68 kg/m². Chi Junk no nausea and no vomiting . Denies Acid reflux/heartburn. . Drinking  32+ ounces of water daily. She is drinking some soda, pepsi and ginger ale. .30 grams  protein intake daily. + BM's. Pt is not  for exercising  Dietary recall -    Breakfast- skip  Lunch- chicken, fish  Dinner- skip, fast food. She is snacking between meals; crackers, chips. Vitamins:  MVI : yes  Calcium : yes  B-Vit 12: yes    Patient has an advanced directive: NO      Ms. Trever Jacobs has a reminder for a \"due or due soon\" health maintenance. I have asked that she contact her primary care provider for follow-up on this health maintenance. Co-Morbid(s)                COMORBIDITY     SLEEP APNEA                 NO        GERD  (req.meds)            NO  HYPERLIPIDEMIA            NO  HYPERTENSION              NO         DIABETES                         NO           Current Outpatient Medications:     NASCOBAL 500 mcg/spray spry, USE 1 SPRAY IN ONE NOSTRIL ONCE A WEEK AS DIRECTED, Disp: 4 mL, Rfl: 8    albuterol (PROVENTIL HFA, VENTOLIN HFA, PROAIR HFA) 90 mcg/actuation inhaler, Take 1-2 Puffs by inhalation every four (4) hours as needed for Wheezing., Disp: 1 Inhaler, Rfl: 1    multivitamin with iron tablet, Take 1 Tab by mouth daily. , Disp: , Rfl:     calcium carb/vit D2/minerals (CALCIUM CITRATE + PO), Take 1 Tab by mouth two (2) times a day., Disp: , Rfl:     Ferrous Sulfate 27 mg iron tab, Take 1 Tab by mouth daily. , Disp: , Rfl:     levonorgestrel (MIRENA) 20 mcg/24 hr (5 years) IUD, 1 Each by IntraUTERine route once. Indications: PREGNANCY CONTRACEPTION, Disp: , Rfl:     loratadine-pseudoephedrine (CLARITIN-D 24 HOUR)  mg per tablet, Take 1 Tab by mouth daily. , Disp: 10 Tab, Rfl: 0    predniSONE (STERAPRED DS) 10 mg dose pack, See administration instruction per 10mg dose pack, Disp: 21 Tab, Rfl: 0    azithromycin (ZITHROMAX) 250 mg tablet, Take two tablets today then one tablet daily, Disp: 6 Tab, Rfl: 0    hyoscyamine SL (LEVSIN/SL) 0.125 mg SL tablet, 1 Tab by SubLINGual route every four (4) hours as needed for Cramping., Disp: 30 Tab, Rfl: 0    omeprazole (PRILOSEC) 20 mg capsule, Take 1 Cap by mouth daily. Indications: PREVENTION OF STRESS ULCER, Disp: 30 Cap, Rfl: 2    ondansetron (ZOFRAN ODT) 4 mg disintegrating tablet, Take 1 Tab by mouth every six (6) hours as needed for Nausea., Disp: 30 Tab, Rfl: 0      Visit Vitals  /84 (BP 1 Location: Left arm, BP Patient Position: Sitting)   Pulse 78   Temp 97.6 °F (36.4 °C) (Oral)   Resp 18   Ht 5' 5\" (1.651 m)   Wt 256 lb 8 oz (116.3 kg)   BMI 42.68 kg/m²     HPI    Review of Systems   Respiratory: Negative for cough. Cardiovascular: Negative for chest pain. Gastrointestinal: Negative for abdominal pain, heartburn, nausea and vomiting. Physical Exam   Constitutional: She is oriented to person, place, and time. She appears well-developed and well-nourished. Cardiovascular: Normal rate and regular rhythm. Exam reveals no gallop and no friction rub. No murmur heard. Pulmonary/Chest: Effort normal and breath sounds normal.   Abdominal: Soft. Bowel sounds are normal. She exhibits no distension. There is no tenderness. No masses or hernias noted. Neurological: She is alert and oriented to person, place, and time. Skin: Skin is warm and dry. Psychiatric: She has a normal mood and affect. ASSESSMENT and Lars Yolanda is a 34 y.o. female with previous Sleeve gastrectomt surgery 13 months ago. . She has lost a total of 55.5 pounds since surgery. She  Has lost 1.5 lbs since the last ov. Body mass index is 42.68 kg/m². Negritoen Sheldon no nausea and no vomiting . Denies Acid reflux/heartburn. . Drinking  32+ ounces of water daily. She is drinking some soda, pepsi and ginger ale. .30 grams  protein intake daily. + BM's. Pt is not  for exercising  We discussed stopping drinking soda. Start exercising. Focus on proteins. Advised patient regard to diet that is high-protein, low-fat, low-sugar, limited carbohydrates. Strive for 50-60 grams of protein daily. If having a snack, foods that are protein or fiber rich. . No eating/drinking together, chew foods well, and portion control. Measure meals. Discussed snacking behavior and to [facility] pay attention to behavioral factor and habits. Drink at least 40-64 ounces of water or non-calorie/non-carbonated beverages daily. Continue vitamin regiment daily. Exercise at least 3 days a week with cardiovascular and strength training. Patient to follow up in 3 months. . Advised to call office if any questions/concerns. Check nutrition labs. Pt verbalized understanding and questions were answered to the best of my knowledge and ability.

## 2019-06-03 NOTE — PATIENT INSTRUCTIONS

## 2019-11-09 ENCOUNTER — HOSPITAL ENCOUNTER (EMERGENCY)
Age: 29
Discharge: HOME | End: 2019-11-09
Attending: EMERGENCY MEDICINE
Payer: COMMERCIAL

## 2019-11-09 VITALS
BODY MASS INDEX: 40.82 KG/M2 | TEMPERATURE: 97.8 F | WEIGHT: 245 LBS | OXYGEN SATURATION: 100 % | HEART RATE: 64 BPM | RESPIRATION RATE: 18 BRPM | SYSTOLIC BLOOD PRESSURE: 96 MMHG | HEIGHT: 65 IN | DIASTOLIC BLOOD PRESSURE: 74 MMHG

## 2019-11-09 DIAGNOSIS — J01.10 ACUTE FRONTAL SINUSITIS, RECURRENCE NOT SPECIFIED: ICD-10-CM

## 2019-11-09 DIAGNOSIS — R51.9 ACUTE NONINTRACTABLE HEADACHE, UNSPECIFIED HEADACHE TYPE: Primary | ICD-10-CM

## 2019-11-09 PROCEDURE — 74011250637 HC RX REV CODE- 250/637: Performed by: NURSE PRACTITIONER

## 2019-11-09 PROCEDURE — 99283 EMERGENCY DEPT VISIT LOW MDM: CPT

## 2019-11-09 RX ORDER — OXYMETAZOLINE HCL 0.05 %
2 SPRAY, NON-AEROSOL (ML) NASAL 2 TIMES DAILY
Qty: 1 EACH | Refills: 0 | Status: SHIPPED | OUTPATIENT
Start: 2019-11-09 | End: 2019-11-12

## 2019-11-09 RX ORDER — DIPHENHYDRAMINE HCL 12.5MG/5ML
25 LIQUID (ML) ORAL
Status: COMPLETED | OUTPATIENT
Start: 2019-11-09 | End: 2019-11-09

## 2019-11-09 RX ORDER — NAPROXEN 250 MG/1
500 TABLET ORAL
Status: COMPLETED | OUTPATIENT
Start: 2019-11-09 | End: 2019-11-09

## 2019-11-09 RX ORDER — NAPROXEN 500 MG/1
500 TABLET ORAL 2 TIMES DAILY WITH MEALS
Qty: 20 TAB | Refills: 0 | Status: SHIPPED | OUTPATIENT
Start: 2019-11-09 | End: 2019-11-19

## 2019-11-09 RX ORDER — CETIRIZINE HCL 10 MG
10 TABLET ORAL DAILY
Qty: 30 TAB | Refills: 0 | Status: SHIPPED | OUTPATIENT
Start: 2019-11-09 | End: 2019-12-09

## 2019-11-09 RX ADMIN — DIPHENHYDRAMINE HYDROCHLORIDE 25 MG: 12.5 LIQUID ORAL at 21:41

## 2019-11-09 RX ADMIN — NAPROXEN 500 MG: 250 TABLET ORAL at 21:41

## 2019-11-09 NOTE — LETTER
[facility] EMERGENCY DEPT 
[address] [address] 
[phone] Work/School Note Date: 11/9/2019 To Whom It May concern: 
 
Rambo Burton was seen and treated today in the emergency room by the following provider(s): 
Attending Provider: Maye Meza MD 
Nurse Practitioner: Dominique Lemus NP. Rambo Burton may return to work on 11-12. Sincerely, Pan Dodson NP

## 2019-11-10 NOTE — ED NOTES
Patient (s) was given copy of dc instructions and no paper script(s) and three electronic scripts. Patient (s) has verbalized understanding of instructions and script (s). Patient was given a current medication reconciliation form and verbalized understanding of their medications. Patient (s) has verbalized understanding of the importance of discussing medications with  his or her physician or clinic they will be following up with. Patient alert and oriented and in no acute distress. Patient offered wheelchair from treatment area to hospital entrance, patient declined wheelchair. Patient left ED with self.

## 2019-11-10 NOTE — ED NOTES
Pt presents in the ED with c/o headache and left sided facial sinus pain x 1.5 weeks. Pt received Azithromycin from [facility] and reports no relief from cold like symptoms. Pt reports being on last day of medications. Pt reports facial pain on palpation of left sinuses. Emergency Department Nursing Plan of Care       The Nursing Plan of Care is developed from the Nursing assessment and Emergency Department Attending provider initial evaluation. The plan of care may be reviewed in the ED Provider note.     The Plan of Care was developed with the following considerations:   Patient / Family readiness to learn indicated by:verbalized understanding  Persons(s) to be included in education: patient  Barriers to Learning/Limitations:No    Signed     Jovi Flores RN    11/09/2019   11:25 AM

## 2019-11-10 NOTE — DISCHARGE INSTRUCTIONS
Saline Nasal Washes: Care Instructions  Your Care Instructions  Saline nasal washes help keep the nasal passages open by washing out thick or dried mucus. This simple remedy can help relieve symptoms of allergies, sinusitis, and colds. It also can make the nose feel more comfortable by keeping the mucous membranes moist. You may notice a little burning sensation in your nose the first few times you use the solution, but this usually gets better in a few days. Follow-up care is a key part of your treatment and safety. Be sure to make and go to all appointments, and call your doctor if you are having problems. It's also a good idea to know your test results and keep a list of the medicines you take. How can you care for yourself at home? · You can buy premixed saline solution in a squeeze bottle or other sinus rinse products at a drugstore. Read and follow the instructions on the label. · You also can make your own saline solution by adding 1 teaspoon of salt and 1 teaspoon of baking soda to 2 cups of distilled water. · If you use a homemade solution, pour a small amount into a clean bowl. Using a rubber bulb syringe, squeeze the syringe and place the tip in the salt water. Pull a small amount of the salt water into the syringe by relaxing your hand. · Sit down with your head tilted slightly back. Do not lie down. Put the tip of the bulb syringe or the squeeze bottle a little way into one of your nostrils. Gently drip or squirt a few drops into the nostril. Repeat with the other nostril. Some sneezing and gagging are normal at first.  · Gently blow your nose. · Wipe the syringe or bottle tip clean after each use. · Repeat this 2 or 3 times a day. · Use nasal washes gently if you have nosebleeds often. When should you call for help? Watch closely for changes in your health, and be sure to contact your doctor if:    · You often get nosebleeds.     · You have problems doing the nasal washes.    Where can you learn more? Go to http://ahn-katia.info/. Enter 071 [id number] [id number] in the search box to learn more about \"Saline Nasal Washes: Care Instructions. \"  Current as of: October 21, 2018  Content Version: 12.2  © 8830-1934 [employer]. Care instructions adapted under license by [employer] (which disclaims liability or warranty for this information). If you have questions about a medical condition or this instruction, always ask your healthcare professional. [address] any warranty or liability for your use of this information. Patient Education        Headache: Care Instructions  Your Care Instructions    Headaches have many possible causes. Most headaches aren't a sign of a more serious problem, and they will get better on their own. Home treatment may help you feel better faster. The doctor has checked you carefully, but problems can develop later. If you notice any problems or new symptoms, get medical treatment right away. Follow-up care is a key part of your treatment and safety. Be sure to make and go to all appointments, and call your doctor if you are having problems. It's also a good idea to know your test results and keep a list of the medicines you take. How can you care for yourself at home? · Do not drive if you have taken a prescription pain medicine. · Rest in a quiet, dark room until your headache is gone. Close your eyes and try to relax or go to sleep. Don't watch TV or read. · Put a cold, moist cloth or cold pack on the painful area for 10 to 20 minutes at a time. Put a thin cloth between the cold pack and your skin. · Use a warm, moist towel or a heating pad set on low to relax tight shoulder and neck muscles. · Have someone gently massage your neck and shoulders. · Take pain medicines exactly as directed. ? If the doctor gave you a prescription medicine for pain, take it as prescribed.   ? If you are not taking a prescription pain medicine, ask your doctor if you can take an over-the-counter medicine. · Be careful not to take pain medicine more often than the instructions allow, because you may get worse or more frequent headaches when the medicine wears off. · Do not ignore new symptoms that occur with a headache, such as a fever, weakness or numbness, vision changes, or confusion. These may be signs of a more serious problem. To prevent headaches  · Keep a headache diary so you can figure out what triggers your headaches. Avoiding triggers may help you prevent headaches. Record when each headache began, how long it lasted, and what the pain was like (throbbing, aching, stabbing, or dull). Write down any other symptoms you had with the headache, such as nausea, flashing lights or dark spots, or sensitivity to bright light or loud noise. Note if the headache occurred near your period. List anything that might have triggered the headache, such as certain foods (chocolate, cheese, wine) or odors, smoke, bright light, stress, or lack of sleep. · Find healthy ways to deal with stress. Headaches are most common during or right after stressful times. Take time to relax before and after you do something that has caused a headache in the past.  · Try to keep your muscles relaxed by keeping good posture. Check your jaw, face, neck, and shoulder muscles for tension, and try relaxing them. When sitting at a desk, change positions often, and stretch for 30 seconds each hour. · Get plenty of sleep and exercise. · Eat regularly and well. Long periods without food can trigger a headache. · Treat yourself to a massage. Some people find that regular massages are very helpful in relieving tension. · Limit caffeine by not drinking too much coffee, tea, or soda. But don't quit caffeine suddenly, because that can also give you headaches.   · Reduce eyestrain from computers by blinking frequently and looking away from the computer screen every so often. Make sure you have proper eyewear and that your monitor is set up properly, about an arm's length away. · Seek help if you have depression or anxiety. Your headaches may be linked to these conditions. Treatment can both prevent headaches and help with symptoms of anxiety or depression. When should you call for help? Call 911 anytime you think you may need emergency care. For example, call if:    · You have signs of a stroke. These may include:  ? Sudden numbness, paralysis, or weakness in your face, arm, or leg, especially on only one side of your body. ? Sudden vision changes. ? Sudden trouble speaking. ? Sudden confusion or trouble understanding simple statements. ? Sudden problems with walking or balance. ? A sudden, severe headache that is different from past headaches.    Call your doctor now or seek immediate medical care if:    · You have a new or worse headache.     · Your headache gets much worse. Where can you learn more? Go to http://han-katia.info/. Enter M271 in the search box to learn more about \"Headache: Care Instructions. \"  Current as of: March 28, 2019  Content Version: 12.2  © 9241-6055 [employer]. Care instructions adapted under license by [employer] (which disclaims liability or warranty for this information). If you have questions about a medical condition or this instruction, always ask your healthcare professional. [address] any warranty or liability for your use of this information.

## 2019-11-10 NOTE — ED TRIAGE NOTES
Pt presents in the ED with c/o headache and facial sinus pain x1.5 weeks. Pt reports going to [address] [place] [address] and received medications but reports no relief. Pt unsure of medication name.

## 2019-11-11 NOTE — ED PROVIDER NOTES
EMERGENCY DEPARTMENT HISTORY AND PHYSICAL EXAM    Date: 11/9/2019  Patient Name: Sridevi Robb    History of Presenting Illness     Chief Complaint   Patient presents with    Headache     Sinus pressure on left side of face         History Provided By: Patient    Chief Complaint: headache  Duration: onset 10 days ago   Timing:  Gradual and Worsening  Location: left side of head  Quality: Aching  Severity: 4 out of 10  Modifying Factors: none  Associated Symptoms: denies any other associated signs or symptoms      HPI: Sridevi Robb is a 34 y.o. female with a PMH of No significant past medical history who presents with headache for the past 10 days. Patient states she was seen at [facility] and given azithromycin but is not getting better. Patient reports she still has nasal congestion. Patient denies taking decongestants. She denies dizziness visual disturbance numbness or tingling. She denies history of migraines. No other complaints at this time. PCP: Van Montilla MD    Current Outpatient Medications   Medication Sig Dispense Refill    oxymetazoline (AFRIN, OXYMETAZOLINE,) 0.05 % nasal spray 2 Sprays by Both Nostrils route two (2) times a day for 3 days. 1 Each 0    cetirizine (ZYRTEC) 10 mg tablet Take 1 Tab by mouth daily for 30 days. 30 Tab 0    naproxen (NAPROSYN) 500 mg tablet Take 1 Tab by mouth two (2) times daily (with meals) for 10 days. 20 Tab 0    NASCOBAL 500 mcg/spray spry USE 1 SPRAY IN ONE NOSTRIL ONCE A WEEK AS DIRECTED 4 mL 8    albuterol (PROVENTIL HFA, VENTOLIN HFA, PROAIR HFA) 90 mcg/actuation inhaler Take 1-2 Puffs by inhalation every four (4) hours as needed for Wheezing. 1 Inhaler 1    predniSONE (STERAPRED DS) 10 mg dose pack See administration instruction per 10mg dose pack 21 Tab 0    azithromycin (ZITHROMAX) 250 mg tablet Take two tablets today then one tablet daily 6 Tab 0    multivitamin with iron tablet Take 1 Tab by mouth daily.       calcium carb/vit D2/minerals (CALCIUM CITRATE + PO) Take 1 Tab by mouth two (2) times a day.  Ferrous Sulfate 27 mg iron tab Take 1 Tab by mouth daily.  hyoscyamine SL (LEVSIN/SL) 0.125 mg SL tablet 1 Tab by SubLINGual route every four (4) hours as needed for Cramping. 30 Tab 0    omeprazole (PRILOSEC) 20 mg capsule Take 1 Cap by mouth daily. Indications: PREVENTION OF STRESS ULCER 30 Cap 2    ondansetron (ZOFRAN ODT) 4 mg disintegrating tablet Take 1 Tab by mouth every six (6) hours as needed for Nausea. 30 Tab 0    levonorgestrel (MIRENA) 20 mcg/24 hr (5 years) IUD 1 Each by IntraUTERine route once. Indications: PREGNANCY CONTRACEPTION         Past History     Past Medical History:  Past Medical History:   Diagnosis Date    Arrhythmia     MURMUR AS A CHILD    Bronchitis     Dyspepsia and other specified disorders of function of stomach     GERD (gastroesophageal reflux disease)     Iron deficiency anemia 4/11/2018       Past Surgical History:  Past Surgical History:   Procedure Laterality Date    HX GYN      c section x2    HX OTHER SURGICAL  04/11/2018    Lap Sleeve Gastrectomy -[facility]-Dr. Tanya Guy       Family History:  Family History   Problem Relation Age of Onset    Diabetes Mother     Elevated Lipids Maternal Grandmother     Psychiatric Disorder Maternal Grandmother     Heart Disease Maternal Grandfather     Psychiatric Disorder Maternal Grandfather     Heart Disease Father        Social History:  Social History     Tobacco Use    Smoking status: Never Smoker    Smokeless tobacco: Never Used   Substance Use Topics    Alcohol use: Yes     Comment:  1 X A MONTH    Drug use: No       Allergies:  No Known Allergies      Review of Systems   Review of Systems   Constitutional: Negative for fatigue and fever. HENT: Positive for rhinorrhea and sinus pain. Respiratory: Negative for shortness of breath and wheezing. Cardiovascular: Negative for chest pain and palpitations.    Gastrointestinal: Negative for abdominal pain. Musculoskeletal: Negative for arthralgias, myalgias, neck pain and neck stiffness. Skin: Negative for pallor and rash. Neurological: Positive for headaches. Negative for dizziness, tremors and weakness. Hematological: Negative for adenopathy. Psychiatric/Behavioral: Negative for agitation and behavioral problems. All other systems reviewed and are negative. Physical Exam     Vitals:    11/09/19 2030   BP: 96/74   Pulse: 64   Resp: 18   Temp: 97.8 °F (36.6 °C)   SpO2: 100%   Weight: 111.1 kg (245 lb)   Height: 5' 5\" (1.651 m)     Physical Exam   Constitutional: She is oriented to person, place, and time. She appears well-developed and well-nourished. No distress. HENT:   Head: Normocephalic and atraumatic. Right Ear: External ear normal.   Left Ear: External ear normal.   Nose: Nose normal.   Mouth/Throat: Oropharynx is clear and moist.   Eyes: Conjunctivae are normal.   Neck: Normal range of motion. Neck supple. Cardiovascular: Normal rate, regular rhythm and normal heart sounds. Pulmonary/Chest: Effort normal and breath sounds normal. No respiratory distress. She has no wheezes. Abdominal: Soft. Bowel sounds are normal. There is no tenderness. Musculoskeletal: Normal range of motion. Lymphadenopathy:     She has no cervical adenopathy. Neurological: She is alert and oriented to person, place, and time. No cranial nerve deficit. Coordination normal.   Skin: Skin is warm and dry. No rash noted. Psychiatric: She has a normal mood and affect. Her behavior is normal. Judgment and thought content normal.   Nursing note and vitals reviewed. Diagnostic Study Results     Labs -   No results found for this or any previous visit (from the past 12 hour(s)).     Radiologic Studies -   No orders to display     CT Results  (Last 48 hours)    None        CXR Results  (Last 48 hours)    None            Medical Decision Making   I am the first provider for this patient. I reviewed the vital signs, available nursing notes, past medical history, past surgical history, family history and social history. Vital Signs-Reviewed the patient's vital signs. Records Reviewed: Nursing Notes            Disposition:  home  DISCHARGE NOTE:         Care plan outlined and precautions discussed. Patient has no new complaints, changes, or physical findings. Results of tests were reviewed with the patient. All medications were reviewed with the patient; will d/c home with afrin Naprosyn and Zyrtec. All of pt's questions and concerns were addressed. Patient was instructed and agrees to follow up with PCP, as well as to return to the ED upon further deterioration. Patient is ready to go home. Follow-up Information     Follow up With Specialties Details Why Contact Info    Juju Hoffman MD Family Practice In 3 days If symptoms worsen [address] [place] [address]. 93.  [phone]            Discharge Medication List as of 11/9/2019 10:42 PM      START taking these medications    Details   oxymetazoline (AFRIN, OXYMETAZOLINE,) 0.05 % nasal spray 2 Sprays by Both Nostrils route two (2) times a day for 3 days. , Normal, Disp-1 Each, R-0      cetirizine (ZYRTEC) 10 mg tablet Take 1 Tab by mouth daily for 30 days. , Normal, Disp-30 Tab, R-0      naproxen (NAPROSYN) 500 mg tablet Take 1 Tab by mouth two (2) times daily (with meals) for 10 days. , Normal, Disp-20 Tab, R-0         CONTINUE these medications which have NOT CHANGED    Details   NASCOBAL 500 mcg/spray spry USE 1 SPRAY IN ONE NOSTRIL ONCE A WEEK AS DIRECTED, Normal, Disp-4 mL, R-8      albuterol (PROVENTIL HFA, VENTOLIN HFA, PROAIR HFA) 90 mcg/actuation inhaler Take 1-2 Puffs by inhalation every four (4) hours as needed for Wheezing., Normal, Disp-1 Inhaler, R-1      predniSONE (STERAPRED DS) 10 mg dose pack See administration instruction per 10mg dose pack, Normal, Disp-21 Tab, R-0      azithromycin (ZITHROMAX) 250 mg tablet Take two tablets today then one tablet daily, Normal, Disp-6 Tab, R-0      multivitamin with iron tablet Take 1 Tab by mouth daily. , Historical Med      calcium carb/vit D2/minerals (CALCIUM CITRATE + PO) Take 1 Tab by mouth two (2) times a day., Historical Med      Ferrous Sulfate 27 mg iron tab Take 1 Tab by mouth daily. , Historical Med      hyoscyamine SL (LEVSIN/SL) 0.125 mg SL tablet 1 Tab by SubLINGual route every four (4) hours as needed for Cramping., Normal, Disp-30 Tab, R-0      omeprazole (PRILOSEC) 20 mg capsule Take 1 Cap by mouth daily. Indications: PREVENTION OF STRESS ULCER, Normal, Disp-30 Cap, R-2      ondansetron (ZOFRAN ODT) 4 mg disintegrating tablet Take 1 Tab by mouth every six (6) hours as needed for Nausea., Normal, Disp-30 Tab, R-0      levonorgestrel (MIRENA) 20 mcg/24 hr (5 years) IUD 1 Each by IntraUTERine route once. Indications: PREGNANCY CONTRACEPTION, Historical Med         STOP taking these medications       loratadine-pseudoephedrine (CLARITIN-D 24 HOUR)  mg per tablet Comments:   Reason for Stopping:               Provider Notes (Medical Decision Making):   DDX sinusitis migraine headache  Procedures:  Procedures    Please note that this dictation was completed with Dragon, computer voice recognition software. Quite often unanticipated grammatical, syntax, homophones, and other interpretive errors are inadvertently transcribed by the computer software. Please disregard these errors. Additionally, please excuse any errors that have escaped final proofreading. Diagnosis     Clinical Impression:   1. Acute nonintractable headache, unspecified headache type    2.  Acute frontal sinusitis, recurrence not specified

## 2020-03-04 ENCOUNTER — TELEPHONE (OUTPATIENT)
Dept: SURGERY | Age: 30
End: 2020-03-04

## 2020-11-19 ENCOUNTER — TELEPHONE (OUTPATIENT)
Dept: SURGERY | Age: 30
End: 2020-11-19

## 2021-02-25 ENCOUNTER — TELEPHONE (OUTPATIENT)
Dept: SURGERY | Age: 31
End: 2021-02-25

## 2021-06-15 ENCOUNTER — HOSPITAL ENCOUNTER (EMERGENCY)
Age: 31
Discharge: HOME | End: 2021-06-15
Attending: EMERGENCY MEDICINE | Admitting: EMERGENCY MEDICINE
Payer: COMMERCIAL

## 2021-06-15 VITALS
SYSTOLIC BLOOD PRESSURE: 118 MMHG | BODY MASS INDEX: 40.82 KG/M2 | HEIGHT: 65 IN | DIASTOLIC BLOOD PRESSURE: 86 MMHG | HEART RATE: 78 BPM | RESPIRATION RATE: 16 BRPM | WEIGHT: 245 LBS | TEMPERATURE: 98 F | OXYGEN SATURATION: 100 %

## 2021-06-15 DIAGNOSIS — M54.16 LUMBAR RADICULOPATHY: ICD-10-CM

## 2021-06-15 DIAGNOSIS — M79.605 PAIN OF LEFT LOWER EXTREMITY: Primary | ICD-10-CM

## 2021-06-15 PROCEDURE — 99282 EMERGENCY DEPT VISIT SF MDM: CPT

## 2021-06-15 RX ORDER — METHOCARBAMOL 750 MG/1
750 TABLET, FILM COATED ORAL 3 TIMES DAILY
Qty: 15 TABLET | Refills: 0 | Status: SHIPPED | OUTPATIENT
Start: 2021-06-15

## 2021-06-15 RX ORDER — NAPROXEN 500 MG/1
500 TABLET ORAL
Qty: 20 TABLET | Refills: 0 | Status: SHIPPED | OUTPATIENT
Start: 2021-06-15

## 2021-06-15 NOTE — ED TRIAGE NOTES
Pt c/o L leg and arm pain x 1.5 weeks. Denies falls or injuries. States she stands a lot at her job and thinks its \"wear and tear. \"  Denies chest pain. Denies shortness of breath. Denies any PMH. Ambulatory in ED with steady gait. Emergency Department Nursing Plan of Care       The Nursing Plan of Care is developed from the Nursing assessment and Emergency Department Attending provider initial evaluation. The plan of care may be reviewed in the ED Provider note.     The Plan of Care was developed with the following considerations:   Patient / Family readiness to learn indicated by:verbalized understanding  Persons(s) to be included in education: patient  Barriers to Learning/Limitations:No    Signed     Veronika Alvarez RN    6/15/2021   8:19 AM

## 2021-06-15 NOTE — ED PROVIDER NOTES
EMERGENCY DEPARTMENT HISTORY AND PHYSICAL EXAM      Date: 6/15/2021  Patient Name: Nguyen Nunez    History of Presenting Illness     Chief Complaint   Patient presents with    Leg Pain    Arm Pain     History Provided By: Patient    HPI: Nguyen Nunez, 32 y.o. female with past medical history significant for bronchitis and GERD who presents via private vehicle to the ED with cc of left lower extremity pain, left low back pain, and left shoulder pain for the past 10 days. She denies any recent fall or injury. She states that she stands a lot at her job and believes her pain is due to overuse. She denies taking any medications for her symptoms stating that she is not a pill person. Her pain is described as a dull/aching pain and denies any radiation of her symptoms. She denies any shortness of breath, chest pain, nausea, vomiting, or diarrhea. PMHx: Bronchitis and GERD  Social Hx: Rare alcohol use, denies tobacco use, denies illegal drug use    PCP: Destinee Gallegos MD    There are no other complaints, changes, or physical findings at this time. No current facility-administered medications on file prior to encounter. Current Outpatient Medications on File Prior to Encounter   Medication Sig Dispense Refill    NASCOBAL 500 mcg/spray spry USE 1 SPRAY IN ONE NOSTRIL ONCE A WEEK AS DIRECTED (Patient not taking: Reported on 6/15/2021) 4 mL 8    albuterol (PROVENTIL HFA, VENTOLIN HFA, PROAIR HFA) 90 mcg/actuation inhaler Take 1-2 Puffs by inhalation every four (4) hours as needed for Wheezing. (Patient not taking: Reported on 6/15/2021) 1 Inhaler 1    predniSONE (STERAPRED DS) 10 mg dose pack See administration instruction per 10mg dose pack (Patient not taking: Reported on 6/15/2021) 21 Tab 0    azithromycin (ZITHROMAX) 250 mg tablet Take two tablets today then one tablet daily (Patient not taking: Reported on 6/15/2021) 6 Tab 0    multivitamin with iron tablet Take 1 Tab by mouth daily. (Patient not taking: Reported on 6/15/2021)      calcium carb/vit D2/minerals (CALCIUM CITRATE + PO) Take 1 Tab by mouth two (2) times a day. (Patient not taking: Reported on 6/15/2021)      Ferrous Sulfate 27 mg iron tab Take 1 Tab by mouth daily. (Patient not taking: Reported on 6/15/2021)      hyoscyamine SL (LEVSIN/SL) 0.125 mg SL tablet 1 Tab by SubLINGual route every four (4) hours as needed for Cramping. (Patient not taking: Reported on 6/15/2021) 30 Tab 0    omeprazole (PRILOSEC) 20 mg capsule Take 1 Cap by mouth daily. Indications: PREVENTION OF STRESS ULCER (Patient not taking: Reported on 6/15/2021) 30 Cap 2    ondansetron (ZOFRAN ODT) 4 mg disintegrating tablet Take 1 Tab by mouth every six (6) hours as needed for Nausea. (Patient not taking: Reported on 6/15/2021) 30 Tab 0    levonorgestrel (MIRENA) 20 mcg/24 hr (5 years) IUD 1 Each by IntraUTERine route once. Indications: PREGNANCY CONTRACEPTION (Patient not taking: Reported on 6/15/2021)       Past History     Past Medical History:  Past Medical History:   Diagnosis Date    Arrhythmia     MURMUR AS A CHILD    Bronchitis     Dyspepsia and other specified disorders of function of stomach     GERD (gastroesophageal reflux disease)     Iron deficiency anemia 4/11/2018     Past Surgical History:  Past Surgical History:   Procedure Laterality Date    HX GYN      c section x2    HX OTHER SURGICAL  04/11/2018    Lap Sleeve Gastrectomy -[facility]-Dr. Dash Morales     Family History:  Family History   Problem Relation Age of Onset    Diabetes Mother     Elevated Lipids Maternal Grandmother     Psychiatric Disorder Maternal Grandmother     Heart Disease Maternal Grandfather     Psychiatric Disorder Maternal Grandfather     Heart Disease Father      Social History:  Social History     Tobacco Use    Smoking status: Never Smoker    Smokeless tobacco: Never Used   Substance Use Topics    Alcohol use: Yes     Comment:  1 X A MONTH    Drug use:  No Allergies:  No Known Allergies  Review of Systems   Review of Systems   Constitutional: Negative for chills and fever. HENT: Negative for congestion, rhinorrhea, sneezing and sore throat. Eyes: Negative for redness and visual disturbance. Respiratory: Negative for shortness of breath. Cardiovascular: Negative for leg swelling. Gastrointestinal: Negative for abdominal pain, nausea and vomiting. Genitourinary: Negative for difficulty urinating and frequency. Musculoskeletal: Positive for arthralgias and back pain. Negative for myalgias and neck stiffness. Skin: Negative for rash. Neurological: Negative for dizziness, syncope, weakness and headaches. Hematological: Negative for adenopathy. All other systems reviewed and are negative. Physical Exam   Physical Exam  Vitals and nursing note reviewed. Constitutional:       Appearance: Normal appearance. She is well-developed. HENT:      Head: Normocephalic and atraumatic. Eyes:      Conjunctiva/sclera: Conjunctivae normal.   Cardiovascular:      Rate and Rhythm: Normal rate and regular rhythm. Pulses: Normal pulses. Heart sounds: Normal heart sounds, S1 normal and S2 normal. No murmur heard. Pulmonary:      Effort: Pulmonary effort is normal. No respiratory distress. Breath sounds: Normal breath sounds. No wheezing. Abdominal:      General: Bowel sounds are normal. There is no distension. Palpations: Abdomen is soft. Tenderness: There is no abdominal tenderness. There is no rebound. Musculoskeletal:         General: Normal range of motion. Cervical back: Full passive range of motion without pain, normal range of motion and neck supple. Lumbar back: Spasms and tenderness present. No bony tenderness. Back:    Skin:     General: Skin is warm and dry. Findings: No rash. Neurological:      Mental Status: She is alert and oriented to person, place, and time.    Psychiatric: Speech: Speech normal.         Behavior: Behavior normal.         Thought Content: Thought content normal.         Judgment: Judgment normal.       Diagnostic Study Results   Labs -   No results found for this or any previous visit (from the past 12 hour(s)). Radiologic Studies -   No orders to display     No results found. Medical Decision Making   I am the first provider for this patient. I reviewed the vital signs, available nursing notes, past medical history, past surgical history, family history and social history. Vital Signs-Reviewed the patient's vital signs. Patient Vitals for the past 24 hrs:   Temp Pulse Resp BP SpO2   06/15/21 0815 98 °F (36.7 °C) 78 16 118/86 100 %     Pulse Oximetry Analysis - 100% on RA (normal)    Records Reviewed: Nursing Notes    Provider Notes (Medical Decision Making):   80-year-old female presents with atraumatic left shoulder and low back pain for the past 10 days. She is tender over her left sciatic nerve but has no midline tenderness or deformities. Will treat with NSAIDs and muscle relaxer and have her follow-up with outpatient primary care. ED Course:   Initial assessment performed. The patients presenting problems have been discussed, and they are in agreement with the care plan formulated and outlined with them. I have encouraged them to ask questions as they arise throughout their visit. Progress Note:   Updated pt on all returned results and findings. Discussed the importance of proper follow up as referred below along with return precautions. Pt in agreement with the care plan and expresses agreement with and understanding of all items discussed. Disposition:  Discharge Note:  The pt is ready for discharge. The pt's signs, symptoms, diagnosis, and discharge instructions have been discussed and pt has conveyed their understanding. The pt is to follow up as recommended or return to ER should their symptoms worsen.  Plan has been discussed and pt is in agreement. PLAN:  1. Discharge Medication List as of 6/15/2021  8:48 AM      START taking these medications    Details   naproxen (NAPROSYN) 500 mg tablet Take 1 Tablet by mouth every twelve (12) hours as needed for Pain., Normal, Disp-20 Tablet, R-0      methocarbamoL (ROBAXIN) 750 mg tablet Take 1 Tablet by mouth three (3) times daily. , Normal, Disp-15 Tablet, R-0         CONTINUE these medications which have NOT CHANGED    Details   NASCOBAL 500 mcg/spray spry USE 1 SPRAY IN ONE NOSTRIL ONCE A WEEK AS DIRECTED, Normal, Disp-4 mL, R-8      albuterol (PROVENTIL HFA, VENTOLIN HFA, PROAIR HFA) 90 mcg/actuation inhaler Take 1-2 Puffs by inhalation every four (4) hours as needed for Wheezing., Normal, Disp-1 Inhaler, R-1      predniSONE (STERAPRED DS) 10 mg dose pack See administration instruction per 10mg dose pack, Normal, Disp-21 Tab, R-0      azithromycin (ZITHROMAX) 250 mg tablet Take two tablets today then one tablet daily, Normal, Disp-6 Tab, R-0      multivitamin with iron tablet Take 1 Tab by mouth daily. , Historical Med      calcium carb/vit D2/minerals (CALCIUM CITRATE + PO) Take 1 Tab by mouth two (2) times a day., Historical Med      Ferrous Sulfate 27 mg iron tab Take 1 Tab by mouth daily. , Historical Med      hyoscyamine SL (LEVSIN/SL) 0.125 mg SL tablet 1 Tab by SubLINGual route every four (4) hours as needed for Cramping., Normal, Disp-30 Tab, R-0      omeprazole (PRILOSEC) 20 mg capsule Take 1 Cap by mouth daily. Indications: PREVENTION OF STRESS ULCER, Normal, Disp-30 Cap, R-2      ondansetron (ZOFRAN ODT) 4 mg disintegrating tablet Take 1 Tab by mouth every six (6) hours as needed for Nausea., Normal, Disp-30 Tab, R-0      levonorgestrel (MIRENA) 20 mcg/24 hr (5 years) IUD 1 Each by IntraUTERine route once. Indications: PREGNANCY CONTRACEPTION, Historical Med           2.    Follow-up Information     Follow up With Specialties Details Why Ulises Wood MD [facility] Schedule an appointment as soon as possible for a visit   [address] [facility]  [place]      Katrina Warner,  Orthopedic Surgery Schedule an appointment as soon as possible for a visit  As needed [address] [facility]  [facility]  [phone]      [address] [place] DEPT Emergency Medicine  As needed, If symptoms worsen [place]  [phone]        Return to ED if worse     Diagnosis     Clinical Impression:   1. Pain of left lower extremity    2. Lumbar radiculopathy            Please note that this dictation was completed with Dragon, computer voice recognition software. Quite often unanticipated grammatical, syntax, homophones, and other interpretive errors are inadvertently transcribed by the computer software. Please disregard these errors. Additionally, please excuse any errors that have escaped final proofreading.

## 2022-02-22 ENCOUNTER — TELEPHONE (OUTPATIENT)
Dept: SURGERY | Age: 32
End: 2022-02-22

## 2022-03-19 PROBLEM — E66.01 MORBID OBESITY WITH BMI OF 50.0-59.9, ADULT (HCC): Status: ACTIVE | Noted: 2017-07-21

## 2022-03-19 PROBLEM — K21.9 GERD (GASTROESOPHAGEAL REFLUX DISEASE): Status: ACTIVE | Noted: 2017-07-21

## 2022-03-19 PROBLEM — D50.9 IRON DEFICIENCY ANEMIA: Status: ACTIVE | Noted: 2018-04-11

## 2023-05-10 RX ORDER — OMEPRAZOLE 20 MG/1
CAPSULE, DELAYED RELEASE ORAL DAILY
COMMUNITY
Start: 2018-04-11

## 2023-05-10 RX ORDER — AZITHROMYCIN 250 MG/1
TABLET, FILM COATED ORAL
COMMUNITY
Start: 2018-11-15

## 2023-05-10 RX ORDER — ALBUTEROL SULFATE 90 UG/1
1-2 AEROSOL, METERED RESPIRATORY (INHALATION) EVERY 4 HOURS PRN
COMMUNITY
Start: 2018-11-15

## 2023-05-10 RX ORDER — NAPROXEN 500 MG/1
TABLET ORAL
COMMUNITY
Start: 2021-06-15

## 2023-05-10 RX ORDER — METHOCARBAMOL 750 MG/1
TABLET, FILM COATED ORAL 3 TIMES DAILY
COMMUNITY
Start: 2021-06-15

## 2023-05-10 RX ORDER — HYOSCYAMINE SULFATE 0.12 MG/1
TABLET SUBLINGUAL EVERY 4 HOURS PRN
COMMUNITY
Start: 2018-04-11

## 2023-05-10 RX ORDER — CYANOCOBALAMIN 500 UG/1
SPRAY NASAL
COMMUNITY
Start: 2019-04-06

## 2023-05-10 RX ORDER — ONDANSETRON 4 MG/1
TABLET, ORALLY DISINTEGRATING ORAL EVERY 6 HOURS PRN
COMMUNITY
Start: 2018-04-11

## 2023-05-10 RX ORDER — PREDNISONE 10 MG/1
TABLET ORAL
COMMUNITY
Start: 2018-11-15

## 2024-05-28 ENCOUNTER — TELEPHONE (OUTPATIENT)
Age: 34
End: 2024-05-28

## 2024-05-28 NOTE — TELEPHONE ENCOUNTER
Patient called because she stated she needed to schedule an appointment to be seen by her surgeon as a check up. The patient had the sleeve done in 2018 and last saw her provider in 2021. I scheduled the patient for an annual exam with Nabila Ordonez and advised she will need to complete the paperwork again since she does not have any on file with us. Patient understood.

## 2024-05-29 NOTE — TELEPHONE ENCOUNTER
We have received the patient updated patient paperwork. I used the new number the patient provided to contact her to reschedule her appt but she was unable. I left a voicemail to give us a call back.

## 2024-05-30 ENCOUNTER — OFFICE VISIT (OUTPATIENT)
Age: 34
End: 2024-05-30
Payer: COMMERCIAL

## 2024-05-30 VITALS
HEIGHT: 65 IN | TEMPERATURE: 98.6 F | HEART RATE: 71 BPM | SYSTOLIC BLOOD PRESSURE: 110 MMHG | BODY MASS INDEX: 44.05 KG/M2 | OXYGEN SATURATION: 97 % | DIASTOLIC BLOOD PRESSURE: 77 MMHG | WEIGHT: 264.4 LBS | RESPIRATION RATE: 17 BRPM

## 2024-05-30 DIAGNOSIS — Z98.84 S/P LAPAROSCOPIC SLEEVE GASTRECTOMY: ICD-10-CM

## 2024-05-30 DIAGNOSIS — E53.8 VITAMIN B12 DEFICIENCY: ICD-10-CM

## 2024-05-30 DIAGNOSIS — E55.9 VITAMIN D DEFICIENCY: ICD-10-CM

## 2024-05-30 DIAGNOSIS — E66.01 MORBID OBESITY (HCC): Primary | ICD-10-CM

## 2024-05-30 DIAGNOSIS — Z00.00 ENCOUNTER FOR PREVENTIVE CARE: ICD-10-CM

## 2024-05-30 DIAGNOSIS — D64.9 ANEMIA, UNSPECIFIED TYPE: ICD-10-CM

## 2024-05-30 PROCEDURE — 4004F PT TOBACCO SCREEN RCVD TLK: CPT | Performed by: NURSE PRACTITIONER

## 2024-05-30 PROCEDURE — G8417 CALC BMI ABV UP PARAM F/U: HCPCS | Performed by: NURSE PRACTITIONER

## 2024-05-30 PROCEDURE — G8427 DOCREV CUR MEDS BY ELIG CLIN: HCPCS | Performed by: NURSE PRACTITIONER

## 2024-05-30 PROCEDURE — 99203 OFFICE O/P NEW LOW 30 MIN: CPT | Performed by: NURSE PRACTITIONER

## 2024-05-30 ASSESSMENT — PATIENT HEALTH QUESTIONNAIRE - PHQ9
SUM OF ALL RESPONSES TO PHQ QUESTIONS 1-9: 0
SUM OF ALL RESPONSES TO PHQ QUESTIONS 1-9: 0
1. LITTLE INTEREST OR PLEASURE IN DOING THINGS: NOT AT ALL
SUM OF ALL RESPONSES TO PHQ QUESTIONS 1-9: 0
2. FEELING DOWN, DEPRESSED OR HOPELESS: NOT AT ALL
SUM OF ALL RESPONSES TO PHQ QUESTIONS 1-9: 0
SUM OF ALL RESPONSES TO PHQ9 QUESTIONS 1 & 2: 0

## 2024-05-30 ASSESSMENT — ENCOUNTER SYMPTOMS
CONSTIPATION: 0
SHORTNESS OF BREATH: 0
ABDOMINAL PAIN: 0
NAUSEA: 0
VOMITING: 0

## 2024-05-30 NOTE — PROGRESS NOTES
Identified patient with two patient identifiers (name and ). Reviewed chart in preparation for visit and have obtained necessary documentation.    Nakita Ordonez is a 34 y.o. female  Chief Complaint   Patient presents with    Weight Management     6 year post lap sleeve gastrectomy     /77 (Site: Right Upper Arm, Position: Sitting, Cuff Size: Large Adult)   Pulse 71   Temp 98.6 °F (37 °C) (Oral)   Resp 17   Ht 1.651 m (5' 5\")   Wt 119.9 kg (264 lb 6.4 oz)   SpO2 97%   BMI 44.00 kg/m²     1. Have you been to the ER, urgent care clinic since your last visit?  Hospitalized since your last visit?no    2. Have you seen or consulted any other health care providers outside of the [facility] since your last visit?  Include any pap smears or colon screening. no

## 2024-05-30 NOTE — PROGRESS NOTES
Nakita Ordonez (:  1990) is a 34 y.o. female,Established patient, here for evaluation of the following chief complaint(s):  Weight Management        SUBJECTIVE/OBJECTIVE:    HPI:  Nakita Ordonez is a 34 y.o. female with previous Sleeve gastrectomy surgery on 6 years . . She has lost a gained 11 lbs pounds since surgery. She  has lost 44 lbs since the last ov.  Body mass index is 44 kg/m².. no nausea and no vomiting . denies Acid reflux/heartburn.. Drinking  30 ounces of water daily. focus protein intake daily. + BM's. . She eats lot of ice.  She had a baby 6 months ago. She wants to get back on track.   Dietary recall -    Breakfast- smoothie, water  Lunch- broccoli, chicken and fried rice  Dinner- turkey burger.     She is  snacking between meals; cracker and cheese.      Vitamins:  MVI : one a day mvi  Calcium : no  B-Vit 12: no  Vit D: No        Ms. Ordonez has a reminder for a \"due or due soon\" health maintenance. I have asked that she contact her primary care provider for follow-up on this health maintenance.        COMORBIDITY     SLEEP APNEA                 NO        GERD  (req.meds)            NO  HYPERLIPIDEMIA            NO  HYPERTENSION              NO         DIABETES                         NO           Current Outpatient Medications:     Multiple Vitamins-Minerals (ONE-A-DAY WOMENS PO), Take by mouth, Disp: , Rfl:     albuterol sulfate HFA (PROVENTIL;VENTOLIN;PROAIR) 108 (90 Base) MCG/ACT inhaler, Inhale 1-2 puffs into the lungs every 4 hours as needed (Patient not taking: Reported on 2024), Disp: , Rfl:     azithromycin (ZITHROMAX) 250 MG tablet, Take two tablets today then one tablet daily (Patient not taking: Reported on 2024), Disp: , Rfl:     Cyanocobalamin (NASCOBAL) 500 MCG/0.1ML SOLN nasal spray, USE 1 SPRAY IN ONE NOSTRIL ONCE A WEEK AS DIRECTED (Patient not taking: Reported on 2024), Disp: , Rfl:     ferrous sulfate 27 MG TABS, Take 1 tablet by mouth daily (Patient not

## 2024-06-03 ENCOUNTER — TELEPHONE (OUTPATIENT)
Age: 34
End: 2024-06-03

## 2024-06-05 ENCOUNTER — TELEPHONE (OUTPATIENT)
Age: 34
End: 2024-06-05

## 2024-06-10 ENCOUNTER — TELEPHONE (OUTPATIENT)
Age: 34
End: 2024-06-10

## 2024-06-25 ENCOUNTER — PREP FOR PROCEDURE (OUTPATIENT)
Age: 34
End: 2024-06-25

## 2024-06-25 ENCOUNTER — TELEPHONE (OUTPATIENT)
Age: 34
End: 2024-06-25

## 2024-06-25 DIAGNOSIS — Z98.84 BARIATRIC SURGERY STATUS: ICD-10-CM

## 2024-06-25 NOTE — TELEPHONE ENCOUNTER
Spoke to patient to schedule her EGD with Dr Bravo at [facility]  I offered 7/8 @ 9:30am with arrival time of 8:30am and she accepted.    I let the patient know they are required to bring someone with them that will be responsible to take them home along with their photo ID and insurance card.      If you are taking any weight lose medication, you need to stop one week before procedure                            Trulicity (dulaglutide)                          Wegovy (Semaglutide)                          Ozempic (Semaglutide)                          Rybelsus (tirzepatide)                          Mounjaro (tirzepatide)                           Zepbound (tirzepatide)        I let them know once this is scheduled I will put the information in a letter along with where they need to go and pre-procedure instructions.   This letter will post to their my chart and go out in the mail.  She acknowledged thank you

## 2024-07-08 ENCOUNTER — HOSPITAL ENCOUNTER (OUTPATIENT)
Facility: HOSPITAL | Age: 34
Setting detail: OUTPATIENT SURGERY
Discharge: HOME | End: 2024-07-08
Attending: SURGERY | Admitting: SURGERY
Payer: COMMERCIAL

## 2024-07-08 ENCOUNTER — ANESTHESIA (OUTPATIENT)
Facility: HOSPITAL | Age: 34
End: 2024-07-08
Payer: COMMERCIAL

## 2024-07-08 ENCOUNTER — ANESTHESIA EVENT (OUTPATIENT)
Facility: HOSPITAL | Age: 34
End: 2024-07-08
Payer: COMMERCIAL

## 2024-07-08 VITALS
TEMPERATURE: 97.9 F | BODY MASS INDEX: 44.24 KG/M2 | HEART RATE: 61 BPM | DIASTOLIC BLOOD PRESSURE: 79 MMHG | WEIGHT: 265.5 LBS | RESPIRATION RATE: 18 BRPM | OXYGEN SATURATION: 100 % | HEIGHT: 65 IN | SYSTOLIC BLOOD PRESSURE: 107 MMHG

## 2024-07-08 LAB — HCG UR QL: NEGATIVE

## 2024-07-08 PROCEDURE — 2580000003 HC RX 258: Performed by: NURSE ANESTHETIST, CERTIFIED REGISTERED

## 2024-07-08 PROCEDURE — 2720000010 HC SURG SUPPLY STERILE: Performed by: SURGERY

## 2024-07-08 PROCEDURE — 81025 URINE PREGNANCY TEST: CPT

## 2024-07-08 PROCEDURE — 2500000003 HC RX 250 WO HCPCS: Performed by: NURSE ANESTHETIST, CERTIFIED REGISTERED

## 2024-07-08 PROCEDURE — 6360000002 HC RX W HCPCS: Performed by: NURSE ANESTHETIST, CERTIFIED REGISTERED

## 2024-07-08 PROCEDURE — 3600007502: Performed by: SURGERY

## 2024-07-08 PROCEDURE — 7100000011 HC PHASE II RECOVERY - ADDTL 15 MIN: Performed by: SURGERY

## 2024-07-08 PROCEDURE — 6370000000 HC RX 637 (ALT 250 FOR IP): Performed by: SURGERY

## 2024-07-08 PROCEDURE — 7100000010 HC PHASE II RECOVERY - FIRST 15 MIN: Performed by: SURGERY

## 2024-07-08 PROCEDURE — 3700000000 HC ANESTHESIA ATTENDED CARE: Performed by: SURGERY

## 2024-07-08 PROCEDURE — 2709999900 HC NON-CHARGEABLE SUPPLY: Performed by: SURGERY

## 2024-07-08 PROCEDURE — 43235 EGD DIAGNOSTIC BRUSH WASH: CPT | Performed by: SURGERY

## 2024-07-08 RX ORDER — LIDOCAINE HYDROCHLORIDE 20 MG/ML
INJECTION, SOLUTION EPIDURAL; INFILTRATION; INTRACAUDAL; PERINEURAL PRN
Status: DISCONTINUED | OUTPATIENT
Start: 2024-07-08 | End: 2024-07-08 | Stop reason: SDUPTHER

## 2024-07-08 RX ORDER — SODIUM CHLORIDE 0.9 % (FLUSH) 0.9 %
5-40 SYRINGE (ML) INJECTION EVERY 12 HOURS SCHEDULED
Status: DISCONTINUED | OUTPATIENT
Start: 2024-07-08 | End: 2024-07-08 | Stop reason: HOSPADM

## 2024-07-08 RX ORDER — SODIUM CHLORIDE 9 MG/ML
INJECTION, SOLUTION INTRAVENOUS CONTINUOUS PRN
Status: DISCONTINUED | OUTPATIENT
Start: 2024-07-08 | End: 2024-07-08 | Stop reason: SDUPTHER

## 2024-07-08 RX ORDER — SODIUM CHLORIDE 9 MG/ML
25 INJECTION, SOLUTION INTRAVENOUS PRN
Status: DISCONTINUED | OUTPATIENT
Start: 2024-07-08 | End: 2024-07-08 | Stop reason: HOSPADM

## 2024-07-08 RX ORDER — SODIUM CHLORIDE 0.9 % (FLUSH) 0.9 %
5-40 SYRINGE (ML) INJECTION PRN
Status: DISCONTINUED | OUTPATIENT
Start: 2024-07-08 | End: 2024-07-08 | Stop reason: HOSPADM

## 2024-07-08 RX ORDER — SIMETHICONE 40MG/0.6ML
SUSPENSION, DROPS(FINAL DOSAGE FORM)(ML) ORAL PRN
Status: DISCONTINUED | OUTPATIENT
Start: 2024-07-08 | End: 2024-07-08 | Stop reason: ALTCHOICE

## 2024-07-08 RX ADMIN — PROPOFOL 50 MG: 10 INJECTION, EMULSION INTRAVENOUS at 09:33

## 2024-07-08 RX ADMIN — PROPOFOL 100 MG: 10 INJECTION, EMULSION INTRAVENOUS at 09:31

## 2024-07-08 RX ADMIN — PROPOFOL 50 MG: 10 INJECTION, EMULSION INTRAVENOUS at 09:34

## 2024-07-08 RX ADMIN — PROPOFOL 50 MG: 10 INJECTION, EMULSION INTRAVENOUS at 09:32

## 2024-07-08 RX ADMIN — LIDOCAINE HYDROCHLORIDE 100 MG: 20 INJECTION, SOLUTION EPIDURAL; INFILTRATION; INTRACAUDAL; PERINEURAL at 09:31

## 2024-07-08 RX ADMIN — PROPOFOL 50 MG: 10 INJECTION, EMULSION INTRAVENOUS at 09:35

## 2024-07-08 RX ADMIN — PROPOFOL 50 MG: 10 INJECTION, EMULSION INTRAVENOUS at 09:37

## 2024-07-08 RX ADMIN — SODIUM CHLORIDE: 9 INJECTION, SOLUTION INTRAVENOUS at 09:29

## 2024-07-08 NOTE — PROGRESS NOTES
7/8/2024        RE: Nakita Ordonez         [address] [facility] [address]          To Whom It May Concern,      Due to medical reasons, Nakita Ordonez may not return to work until 7/9/2024.       Sincerely,          Maryuri Stern RN

## 2024-07-08 NOTE — DISCHARGE INSTRUCTIONS
for yourself at home?  Activity   Rest as much as you need to after you go home.  You should be able to go back to your usual activities the day after the test.  Diet   Follow your doctor's directions for eating after the test.  Drink plenty of fluids (unless your doctor has told you not to).  Medications   If you have a sore throat the day after the test, use an over-the-counter spray to numb your throat.  Follow-up care is a key part of your treatment and safety. Be sure to make and go to all appointments, and call your doctor if you are having problems. It's also a good idea to know your test results and keep a list of the medicines you take.  When should you call for help?   Call 911 anytime you think you may need emergency care. For example, call if:    You passed out (lost consciousness).     You have trouble breathing.     You pass maroon or bloody stools.   Call your doctor now or seek immediate medical care if:    You have pain that does not get better after your take pain medicine.     You have new or worse belly pain.     You have blood in your stools.     You are sick to your stomach and cannot keep fluids down.     You have a fever.     You cannot pass stools or gas.   Watch closely for changes in your health, and be sure to contact your doctor if:    Your throat still hurts after a day or two.     You do not get better as expected.   Where can you learn more?  Go to [email or web address].net/patientEd and enter J454 to learn more about \"Upper GI Endoscopy: What to Expect at Home.\"  Current as of: October 19, 2023  Content Version: 14.1  © 0037-0113 [employer].   Care instructions adapted under license by [employer]. If you have questions about a medical condition or this instruction, always ask your healthcare professional. [employer] disclaims any warranty or liability for your use of this information.

## 2024-07-08 NOTE — PROGRESS NOTES

## 2024-07-08 NOTE — OP NOTE
Operative Note      Patient: Nakita Ordonez  YOB: 1990  MRN: [id number]    Date of Procedure: 7/8/2024    Pre-Op Diagnosis Codes:     * Bariatric surgery status [Z98.84]    Post-Op Diagnosis: Same       Procedure(s):  ESOPHAGOGASTRODUODENOSCOPY    Surgeon(s):  Jona Bravo MD    Assistant:   * No surgical staff found *    Anesthesia: Monitor Anesthesia Care    Estimated Blood Loss (mL): Minimal    Complications: None    Specimens:   * No specimens in log *    Implants:  * No implants in log *      Drains: * No LDAs found *        Findings:    Esophagus:  normal, normal GE junction   Stomach: normal post sleeve, no hiatal hernia, no gastritis, nomal pylorus   Duodenum:  normal       Detailed Description of Procedure:   Patient was met in the preop holding area.  She was taken back to the endoscopy suite.  She was lying in a decubitus position consent was signed all risk and benefits were explained to the patient timeout was called.  I then introduced the endoscope after the patient was asleep down the mouth to the esophagus and down into the stomach I was able to get the scope down into the area of the antrum and pylorus and into the duodenum the duodenum appeared to be normal the distal portion of the stomach appeared to be normal without any gastritis.  The patient had a normal-appearing post sleeve gastrectomy stomach without any angulation twisting bending or strictures.  There is no gastritis.  The GE junction appeared to be normal there is no sign of any hiatal hernia the distal esophagus had no esophagitis or any other abnormalities.  Then decompressed the stomach and slowly withdrew the scope examining the entire surface of the esophagus and finding no abnormalities and that concluded the procedure    Electronically signed by Jona Bravo MD on 7/8/2024 at 9:44 AM

## 2024-07-08 NOTE — H&P
Nakita Ordonez (:  1990) is a 34 y.o. female,Established patient, here for evaluation of the following chief complaint(s):  Weight Management           SUBJECTIVE/OBJECTIVE:     HPI:  Nakita Ordonez is a 34 y.o. female with previous Sleeve gastrectomy surgery on 6 years . . She has lost a gained 11 lbs pounds since surgery. She  has lost 44 lbs since the last ov.  Body mass index is 44 kg/m².. no nausea and no vomiting . denies Acid reflux/heartburn.. Drinking  30 ounces of water daily. focus protein intake daily. + BM's. . She eats lot of ice.  She had a baby 6 months ago. She wants to get back on track.   Dietary recall -    Breakfast- smoothie, water  Lunch- broccoli, chicken and fried rice  Dinner- turkey burger.      She is  snacking between meals; cracker and cheese.      Vitamins:  MVI : one a day mvi  Calcium : no  B-Vit 12: no  Vit D: No           Ms. Ordonez has a reminder for a \"due or due soon\" health maintenance. I have asked that she contact her primary care provider for follow-up on this health maintenance.           COMORBIDITY      SLEEP APNEA                 NO        GERD  (req.meds)            NO  HYPERLIPIDEMIA            NO  HYPERTENSION              NO         DIABETES                         NO            Current Medication      Current Outpatient Medications:     Multiple Vitamins-Minerals (ONE-A-DAY WOMENS PO), Take by mouth, Disp: , Rfl:     albuterol sulfate HFA (PROVENTIL;VENTOLIN;PROAIR) 108 (90 Base) MCG/ACT inhaler, Inhale 1-2 puffs into the lungs every 4 hours as needed (Patient not taking: Reported on 2024), Disp: , Rfl:     azithromycin (ZITHROMAX) 250 MG tablet, Take two tablets today then one tablet daily (Patient not taking: Reported on 2024), Disp: , Rfl:     Cyanocobalamin (NASCOBAL) 500 MCG/0.1ML SOLN nasal spray, USE 1 SPRAY IN ONE NOSTRIL ONCE A WEEK AS DIRECTED (Patient not taking: Reported on 2024), Disp: , Rfl:     ferrous sulfate 27 MG TABS, Take 1

## 2024-07-08 NOTE — ANESTHESIA PRE PROCEDURE
Department of Anesthesiology  Preprocedure Note       Name:  Nakita Ordonez   Age:  34 y.o.  :  1990                                          MRN:  [id number]         Date:  2024      Surgeon: Surgeon(s):  Jona Bravo MD    Procedure: Procedure(s):  ESOPHAGOGASTRODUODENOSCOPY    Medications prior to admission:   Prior to Admission medications    Medication Sig Start Date End Date Taking? Authorizing Provider   Multiple Vitamins-Minerals (ONE-A-DAY WOMENS PO) Take by mouth    Provider, MD Carlo   albuterol sulfate HFA (PROVENTIL;VENTOLIN;PROAIR) 108 (90 Base) MCG/ACT inhaler Inhale 1-2 puffs into the lungs every 4 hours as needed  Patient not taking: Reported on 2024 11/15/18   Automatic Reconciliation, Ar   azithromycin (ZITHROMAX) 250 MG tablet Take two tablets today then one tablet daily  Patient not taking: Reported on 2024 11/15/18   Automatic Reconciliation, Ar   Cyanocobalamin (NASCOBAL) 500 MCG/0.1ML SOLN nasal spray USE 1 SPRAY IN ONE NOSTRIL ONCE A WEEK AS DIRECTED  Patient not taking: Reported on 2024   Automatic Reconciliation, Ar   ferrous sulfate 27 MG TABS Take 1 tablet by mouth daily  Patient not taking: Reported on 2024    Automatic Reconciliation, Ar   Hyoscyamine Sulfate SL 0.125 MG SUBL Place under the tongue every 4 hours as needed  Patient not taking: Reported on 2024   Automatic Reconciliation, Ar   levonorgestrel (MIRENA) IUD 52 mg 1 each by IntraUTERine route once  Patient not taking: Reported on 2024    Automatic Reconciliation, Ar   methocarbamol (ROBAXIN) 750 MG tablet Take by mouth 3 times daily  Patient not taking: Reported on 2024 6/15/21   Automatic Reconciliation, Ar   naproxen (NAPROSYN) 500 MG tablet Take by mouth  Patient not taking: Reported on 2024 6/15/21   Automatic Reconciliation, Ar   omeprazole (PRILOSEC) 20 MG delayed release capsule Take by mouth daily  Patient not taking: Reported on 2024

## 2024-07-08 NOTE — ANESTHESIA POSTPROCEDURE EVALUATION
Department of Anesthesiology  Postprocedure Note    Patient: Nakita Ordonez  MRN: [id number]  YOB: 1990  Date of evaluation: 7/8/2024    Procedure Summary       Date: 07/08/24 Room / Location: [facility] ENDO 02 / [facility] ENDOSCOPY    Anesthesia Start: 0929 Anesthesia Stop: 0939    Procedure: ESOPHAGOGASTRODUODENOSCOPY (Upper GI Region) Diagnosis:       Bariatric surgery status      (Bariatric surgery status [Z98.84])    Surgeons: Jona Bravo MD Responsible Provider: Romero Matta MD    Anesthesia Type: MAC ASA Status: 3            Anesthesia Type: MAC    Jarret Phase I: Jarret Score: 10    Jarert Phase II: Jarret Score: 10    Anesthesia Post Evaluation    Patient location during evaluation: PACU  Patient participation: complete - patient participated  Level of consciousness: awake  Pain score: 0  Airway patency: patent  Nausea & Vomiting: no nausea  Cardiovascular status: blood pressure returned to baseline and hemodynamically stable  Respiratory status: acceptable  Hydration status: stable  Pain management: adequate and satisfactory to patient    No notable events documented.

## (undated) DEVICE — POWER SHELL: Brand: SIGNIA

## (undated) DEVICE — Z INACTIVE USE 2240337 DRAPE SURG PT TRANSFER TRAWAY SHT

## (undated) DEVICE — (D)PREP SKN CHLRAPRP APPL 26ML -- CONVERT TO ITEM 371833

## (undated) DEVICE — NEEDLE HYPO 22GA L1.5IN BLK S STL HUB POLYPR SHLD REG BVL

## (undated) DEVICE — DEVON™ KNEE AND BODY STRAP 60" X 3" (1.5 M X 7.6 CM): Brand: DEVON

## (undated) DEVICE — REINFORCED INTELLIGENT RELOAD, FOR USE WITH SIGNIA STAPLING SYSTEM: Brand: TRI-STAPLE 2.0

## (undated) DEVICE — BLACK REINFORCED INTELLIGENT RELOAD, FOR USE WITH SIGNIA STAPLING SYSTEM: Brand: TRI-STAPLE 2.0

## (undated) DEVICE — ORISE PROKNIFE 1.5 MM ELECTRODE: Brand: ORISE™ PROKNIFE

## (undated) DEVICE — INFECTION CONTROL KIT SYS

## (undated) DEVICE — VISUALIZATION SYSTEM: Brand: CLEARIFY

## (undated) DEVICE — SUTURE MCRYL SZ 4-0 L27IN ABSRB UD L24MM PS-1 3/8 CIR PRIM Y935H

## (undated) DEVICE — ORISE PROKNIFE 3.0 MM ELECTRODE: Brand: ORISE™ PROKNIFE

## (undated) DEVICE — TUBING INSUFLTN 10FT LUER -- CONVERT TO ITEM 368568

## (undated) DEVICE — SUTURE SZ 0 27IN 5/8 CIR UR-6  TAPER PT VIOLET ABSRB VICRYL J603H

## (undated) DEVICE — 39" SINGLE PATIENT USE HOVERMATT BREATHABLE: Brand: SINGLE PATIENT USE HOVERMATT

## (undated) DEVICE — ENDO CARRY-ON PROCEDURE KIT INCLUDES ENZYMATIC SPONGE, GAUZE, BIOHAZARD LABEL, TRAY, LUBRICANT, DIRTY SCOPE LABEL, WATER LABEL, TRAY, DRAWSTRING PAD, AND DEFENDO 4-PIECE KIT.: Brand: ENDO CARRY-ON PROCEDURE KIT

## (undated) DEVICE — STERILE POLYISOPRENE POWDER-FREE SURGICAL GLOVES WITH EMOLLIENT COATING: Brand: PROTEXIS

## (undated) DEVICE — REM POLYHESIVE ADULT PATIENT RETURN ELECTRODE: Brand: VALLEYLAB

## (undated) DEVICE — DRAPE,UTILTY,TAPE,15X26, 4EA/PK: Brand: MEDLINE

## (undated) DEVICE — GOWN,SIRUS,FABRNF,XL,20/CS: Brand: MEDLINE

## (undated) DEVICE — KENDALL SCD EXPRESS SLEEVES, KNEE LENGTH, MEDIUM: Brand: KENDALL SCD

## (undated) DEVICE — SOLUTION IV 1000ML 0.9% SOD CHL

## (undated) DEVICE — UNIVERSAL FIXATION CANNULA: Brand: VERSAONE

## (undated) DEVICE — FORCEPS BX L240CM JAW DIA2.4MM ORNG L CAP W/ NDL DISP RAD

## (undated) DEVICE — TUBING IRRIG COMPATIBLE W ERBE MEDIVATOR PMP HYDR

## (undated) DEVICE — FILTER SMK EVAC FLO CLR MEGADYNE

## (undated) DEVICE — SUT ETHLN 2-0 18IN FS BLK --

## (undated) DEVICE — BLADELESS OPTICAL TROCAR WITH FIXATION CANNULA: Brand: VERSAPORT

## (undated) DEVICE — VISIGI 3D®  CALIBRATION SYSTEM  SIZE 40FR STD W/ BULB: Brand: BOEHRINGER® VISIGI 3D™ SLEEVE GASTRECTOMY CALIBRATION SYSTEM, SIZE 40FR W/BULB

## (undated) DEVICE — Device

## (undated) DEVICE — BLADELESS TROCAR WITH FIXATION CANNULA: Brand: VERSAPORT PLUS

## (undated) DEVICE — SURGICAL PROCEDURE KIT GEN LAPAROSCOPY LF

## (undated) DEVICE — BNDG ADHESIVE FABRIC 2X3.5IN -- CONVERT TO ITEM 357955

## (undated) DEVICE — RELOAD STPL M SZ 2 THK30MM PUR THCK TRI STPL

## (undated) DEVICE — DISSECTOR ULTRASONIC L48CM CRDLSS W/ TORQ WRNCH SONICISION

## (undated) DEVICE — APPLICATOR BNDG 1MM ADH PREMIERPRO EXOFIN

## (undated) DEVICE — SUTURE DEV SZ 2-0 WND CLSR ABSRB GS-22 VLOC COVIDIEN VLOCM2145

## (undated) DEVICE — 3000CC GUARDIAN II: Brand: GUARDIAN

## (undated) DEVICE — CLICKLINE SCISSORS INSERT: Brand: CLICKLINE

## (undated) DEVICE — TROCAR SITE CLOSURE DEVICE: Brand: ENDO CLOSE